# Patient Record
Sex: FEMALE | Race: WHITE | NOT HISPANIC OR LATINO | Employment: OTHER | URBAN - METROPOLITAN AREA
[De-identification: names, ages, dates, MRNs, and addresses within clinical notes are randomized per-mention and may not be internally consistent; named-entity substitution may affect disease eponyms.]

---

## 2017-01-19 ENCOUNTER — HISTORICAL (OUTPATIENT)
Dept: RADIOLOGY | Facility: HOSPITAL | Age: 67
End: 2017-01-19

## 2017-02-16 ENCOUNTER — HISTORICAL (OUTPATIENT)
Dept: RADIOLOGY | Facility: HOSPITAL | Age: 67
End: 2017-02-16

## 2017-05-18 ENCOUNTER — HISTORICAL (OUTPATIENT)
Dept: RADIOLOGY | Facility: HOSPITAL | Age: 67
End: 2017-05-18

## 2018-01-22 ENCOUNTER — HISTORICAL (OUTPATIENT)
Dept: CARDIOLOGY | Facility: HOSPITAL | Age: 68
End: 2018-01-22

## 2018-07-24 ENCOUNTER — HISTORICAL (OUTPATIENT)
Dept: SURGERY | Facility: HOSPITAL | Age: 68
End: 2018-07-24

## 2018-07-26 ENCOUNTER — HISTORICAL (OUTPATIENT)
Dept: ANESTHESIOLOGY | Facility: HOSPITAL | Age: 68
End: 2018-07-26

## 2018-09-17 ENCOUNTER — HISTORICAL (OUTPATIENT)
Dept: RESPIRATORY THERAPY | Facility: HOSPITAL | Age: 68
End: 2018-09-17

## 2018-09-28 ENCOUNTER — HISTORICAL (OUTPATIENT)
Dept: RADIOLOGY | Facility: HOSPITAL | Age: 68
End: 2018-09-28

## 2018-10-19 ENCOUNTER — HISTORICAL (OUTPATIENT)
Dept: RADIOLOGY | Facility: HOSPITAL | Age: 68
End: 2018-10-19

## 2019-08-29 ENCOUNTER — HISTORICAL (OUTPATIENT)
Dept: RADIOLOGY | Facility: HOSPITAL | Age: 69
End: 2019-08-29

## 2019-10-21 ENCOUNTER — HISTORICAL (OUTPATIENT)
Dept: RADIOLOGY | Facility: HOSPITAL | Age: 69
End: 2019-10-21

## 2020-08-25 ENCOUNTER — HISTORICAL (OUTPATIENT)
Dept: RESPIRATORY THERAPY | Facility: HOSPITAL | Age: 70
End: 2020-08-25

## 2021-03-17 ENCOUNTER — HISTORICAL (OUTPATIENT)
Dept: RADIOLOGY | Facility: HOSPITAL | Age: 71
End: 2021-03-17

## 2021-05-20 ENCOUNTER — HISTORICAL (OUTPATIENT)
Dept: LAB | Facility: HOSPITAL | Age: 71
End: 2021-05-20

## 2021-07-23 ENCOUNTER — HISTORICAL (OUTPATIENT)
Dept: RADIOLOGY | Facility: HOSPITAL | Age: 71
End: 2021-07-23

## 2021-07-27 ENCOUNTER — HISTORICAL (OUTPATIENT)
Dept: RADIOLOGY | Facility: HOSPITAL | Age: 71
End: 2021-07-27

## 2021-12-19 ENCOUNTER — HISTORICAL (OUTPATIENT)
Dept: ADMINISTRATIVE | Facility: HOSPITAL | Age: 71
End: 2021-12-19

## 2021-12-22 ENCOUNTER — HISTORICAL (OUTPATIENT)
Dept: ADMINISTRATIVE | Facility: HOSPITAL | Age: 71
End: 2021-12-22

## 2022-01-01 ENCOUNTER — HISTORICAL (OUTPATIENT)
Dept: ADMINISTRATIVE | Facility: HOSPITAL | Age: 72
End: 2022-01-01

## 2022-01-05 ENCOUNTER — HISTORICAL (OUTPATIENT)
Dept: ADMINISTRATIVE | Facility: HOSPITAL | Age: 72
End: 2022-01-05

## 2022-01-05 LAB
ABS NEUT (OLG): 6.1 X10(3)/MCL (ref 1.5–6.9)
ALBUMIN SERPL-MCNC: 3.2 GM/DL (ref 3.4–4.8)
ALBUMIN/GLOB SERPL: 0.9 RATIO (ref 1.1–2)
ALP SERPL-CCNC: 107 UNIT/L (ref 40–150)
ALT SERPL-CCNC: 9 UNIT/L (ref 0–55)
AST SERPL-CCNC: 17 UNIT/L (ref 5–34)
BASOPHILS # BLD AUTO: 0.1 X10(3)/MCL (ref 0–0.1)
BASOPHILS NFR BLD AUTO: 1 % (ref 0–1)
BILIRUB SERPL-MCNC: 0.8 MG/DL
BILIRUBIN DIRECT+TOT PNL SERPL-MCNC: 0.3 MG/DL (ref 0–0.8)
BILIRUBIN DIRECT+TOT PNL SERPL-MCNC: 0.5 MG/DL (ref 0–0.5)
BNP BLD-MCNC: 647.1 PG/ML
BUN SERPL-MCNC: 24 MG/DL (ref 9.8–20.1)
CALCIUM SERPL-MCNC: 9.5 MG/DL (ref 8.7–10.5)
CHLORIDE SERPL-SCNC: 93 MMOL/L (ref 98–107)
CO2 SERPL-SCNC: 36 MMOL/L (ref 23–31)
CREAT SERPL-MCNC: 1.28 MG/DL (ref 0.55–1.02)
EOSINOPHIL # BLD AUTO: 0.4 X10(3)/MCL (ref 0–0.6)
EOSINOPHIL NFR BLD AUTO: 4 % (ref 0–5)
ERYTHROCYTE [DISTWIDTH] IN BLOOD BY AUTOMATED COUNT: 17.1 % (ref 11.5–17)
GLOBULIN SER-MCNC: 3.6 GM/DL (ref 2.4–3.5)
GLUCOSE SERPL-MCNC: 147 MG/DL (ref 82–115)
HCT VFR BLD AUTO: 33.2 % (ref 36–48)
HGB BLD-MCNC: 9.8 GM/DL (ref 12–16)
IMM GRANULOCYTES # BLD AUTO: 0.03 10*3/UL (ref 0–0.02)
IMM GRANULOCYTES NFR BLD AUTO: 0.3 % (ref 0–0.43)
LYMPHOCYTES # BLD AUTO: 1.9 X10(3)/MCL (ref 0.5–4.1)
LYMPHOCYTES NFR BLD AUTO: 21 % (ref 15–40)
MCH RBC QN AUTO: 25 PG (ref 27–34)
MCHC RBC AUTO-ENTMCNC: 30 GM/DL (ref 31–36)
MCV RBC AUTO: 84 FL (ref 80–99)
MONOCYTES # BLD AUTO: 0.6 X10(3)/MCL (ref 0–1.1)
MONOCYTES NFR BLD AUTO: 7 % (ref 4–12)
NEUTROPHILS # BLD AUTO: 6.1 X10(3)/MCL (ref 1.5–6.9)
NEUTROPHILS NFR BLD AUTO: 67 % (ref 43–75)
PLATELET # BLD AUTO: 302 X10(3)/MCL (ref 140–400)
PMV BLD AUTO: 9.8 FL (ref 6.8–10)
POTASSIUM SERPL-SCNC: 3.7 MMOL/L (ref 3.5–5.1)
PROT SERPL-MCNC: 6.8 GM/DL (ref 5.8–7.6)
RBC # BLD AUTO: 3.94 X10(6)/MCL (ref 4.2–5.4)
SODIUM SERPL-SCNC: 139 MMOL/L (ref 136–145)
WBC # SPEC AUTO: 9.1 X10(3)/MCL (ref 4.5–11.5)

## 2022-01-13 ENCOUNTER — HISTORICAL (OUTPATIENT)
Dept: ADMINISTRATIVE | Facility: HOSPITAL | Age: 72
End: 2022-01-13

## 2022-01-13 LAB
APPEARANCE, UA: ABNORMAL
BACTERIA SPEC CULT: ABNORMAL /HPF
BILIRUB UR QL STRIP: NEGATIVE
COLOR UR: YELLOW
GLUCOSE (UA): NEGATIVE MG/DL
HGB UR QL STRIP: ABNORMAL
KETONES UR QL STRIP: NEGATIVE MG/DL
LEUKOCYTE ESTERASE UR QL STRIP: ABNORMAL
NITRITE UR QL STRIP: NEGATIVE
PH UR STRIP: 6 [PH] (ref 4.6–8)
PROT UR QL STRIP: 30 MG/DL
RBC #/AREA URNS HPF: ABNORMAL /HPF
SP GR UR STRIP: 1.02 (ref 1–1.03)
SQUAMOUS EPITHELIAL, UA: ABNORMAL
UROBILINOGEN UR STRIP-ACNC: 2 EU/DL
WBC #/AREA URNS HPF: ABNORMAL /HPF

## 2022-01-16 ENCOUNTER — HISTORICAL (OUTPATIENT)
Dept: ADMINISTRATIVE | Facility: HOSPITAL | Age: 72
End: 2022-01-16

## 2022-01-16 LAB
BUN SERPL-MCNC: 20 MG/DL (ref 9.8–20.1)
CALCIUM SERPL-MCNC: 9.3 MG/DL (ref 8.7–10.5)
CHLORIDE SERPL-SCNC: 96 MMOL/L (ref 98–107)
CO2 SERPL-SCNC: 36 MMOL/L (ref 23–31)
CREAT SERPL-MCNC: 1.23 MG/DL (ref 0.55–1.02)
CREAT/UREA NIT SERPL: 16
GLUCOSE SERPL-MCNC: 277 MG/DL (ref 82–115)
POTASSIUM SERPL-SCNC: 3.4 MMOL/L (ref 3.5–5.1)
SODIUM SERPL-SCNC: 141 MMOL/L (ref 136–145)

## 2022-01-22 ENCOUNTER — HISTORICAL (OUTPATIENT)
Dept: ADMINISTRATIVE | Facility: HOSPITAL | Age: 72
End: 2022-01-22

## 2022-01-22 LAB
BUN SERPL-MCNC: 35 MG/DL (ref 9.8–20.1)
CALCIUM SERPL-MCNC: 8.8 MG/DL (ref 8.7–10.5)
CHLORIDE SERPL-SCNC: 92 MMOL/L (ref 98–107)
CO2 SERPL-SCNC: 34 MMOL/L (ref 23–31)
CREAT SERPL-MCNC: 1.29 MG/DL (ref 0.55–1.02)
CREAT/UREA NIT SERPL: 27
GLUCOSE SERPL-MCNC: 256 MG/DL (ref 82–115)
POTASSIUM SERPL-SCNC: 3.1 MMOL/L (ref 3.5–5.1)
SODIUM SERPL-SCNC: 138 MMOL/L (ref 136–145)

## 2022-01-23 ENCOUNTER — HISTORICAL (OUTPATIENT)
Dept: ADMINISTRATIVE | Facility: HOSPITAL | Age: 72
End: 2022-01-23

## 2022-01-23 LAB
BUN SERPL-MCNC: 31 MG/DL (ref 9.8–20.1)
CALCIUM SERPL-MCNC: 9.2 MG/DL (ref 8.7–10.5)
CHLORIDE SERPL-SCNC: 94 MMOL/L (ref 98–107)
CO2 SERPL-SCNC: 34 MMOL/L (ref 23–31)
CREAT SERPL-MCNC: 1.31 MG/DL (ref 0.55–1.02)
CREAT/UREA NIT SERPL: 24
GLUCOSE SERPL-MCNC: 142 MG/DL (ref 82–115)
POTASSIUM SERPL-SCNC: 3.5 MMOL/L (ref 3.5–5.1)
SODIUM SERPL-SCNC: 138 MMOL/L (ref 136–145)

## 2022-02-06 ENCOUNTER — HISTORICAL (OUTPATIENT)
Dept: ADMINISTRATIVE | Facility: HOSPITAL | Age: 72
End: 2022-02-06

## 2022-02-06 LAB
BUN SERPL-MCNC: 25 MG/DL (ref 9.8–20.1)
CALCIUM SERPL-MCNC: 9.8 MG/DL (ref 8.7–10.5)
CHLORIDE SERPL-SCNC: 92 MMOL/L (ref 98–107)
CO2 SERPL-SCNC: 37 MMOL/L (ref 23–31)
CREAT SERPL-MCNC: 1.38 MG/DL (ref 0.55–1.02)
CREAT/UREA NIT SERPL: 18
GLUCOSE SERPL-MCNC: 186 MG/DL (ref 82–115)
HEMOLYSIS INTERF INDEX SERPL-ACNC: 3
ICTERIC INTERF INDEX SERPL-ACNC: 0
LIPEMIC INTERF INDEX SERPL-ACNC: 10
POTASSIUM SERPL-SCNC: 4.3 MMOL/L (ref 3.5–5.1)
SODIUM SERPL-SCNC: 141 MMOL/L (ref 136–145)

## 2022-02-08 ENCOUNTER — HISTORICAL (OUTPATIENT)
Dept: ADMINISTRATIVE | Facility: HOSPITAL | Age: 72
End: 2022-02-08

## 2022-02-08 LAB
ABS NEUT (OLG): 4.7 (ref 1.5–6.9)
ALBUMIN SERPL-MCNC: 3.4 G/DL (ref 3.4–4.8)
ALBUMIN/GLOB SERPL: 1 {RATIO} (ref 1.1–2)
ALP SERPL-CCNC: 129 U/L (ref 40–150)
ALT SERPL-CCNC: 8 U/L (ref 0–55)
AST SERPL-CCNC: 20 U/L (ref 5–34)
BASOPHILS # BLD AUTO: 0.1 10*3/UL (ref 0–0.1)
BASOPHILS NFR BLD AUTO: 1 % (ref 0–1)
BILIRUB SERPL-MCNC: 0.5 MG/DL
BILIRUBIN DIRECT+TOT PNL SERPL-MCNC: 0.2 (ref 0–0.8)
BILIRUBIN DIRECT+TOT PNL SERPL-MCNC: 0.3 (ref 0–0.5)
BNP BLD-MCNC: 334.7 PG/ML
BUN SERPL-MCNC: 37 MG/DL (ref 9.8–20.1)
CALCIUM SERPL-MCNC: 9.8 MG/DL (ref 8.7–10.5)
CHLORIDE SERPL-SCNC: 86 MMOL/L (ref 98–107)
CO2 SERPL-SCNC: 42 MMOL/L (ref 23–31)
CREAT SERPL-MCNC: 1.56 MG/DL (ref 0.55–1.02)
EOSINOPHIL # BLD AUTO: 0.3 10*3/UL (ref 0–0.6)
EOSINOPHIL NFR BLD AUTO: 4 % (ref 0–5)
ERYTHROCYTE [DISTWIDTH] IN BLOOD BY AUTOMATED COUNT: 16.1 % (ref 11.5–17)
GLOBULIN SER-MCNC: 3.5 G/DL (ref 2.4–3.5)
GLUCOSE SERPL-MCNC: 169 MG/DL (ref 82–115)
HCT VFR BLD AUTO: 33.1 % (ref 36–48)
HEMOLYSIS INTERF INDEX SERPL-ACNC: 0
HGB BLD-MCNC: 10.2 G/DL (ref 12–16)
ICTERIC INTERF INDEX SERPL-ACNC: 0
IMM GRANULOCYTES # BLD AUTO: 0.03 10*3/UL (ref 0–0.02)
IMM GRANULOCYTES NFR BLD AUTO: 0.4 % (ref 0–0.43)
LIPEMIC INTERF INDEX SERPL-ACNC: >10
LYMPHOCYTES # BLD AUTO: 1.5 10*3/UL (ref 0.5–4.1)
LYMPHOCYTES NFR BLD AUTO: 21 % (ref 15–40)
MANUAL DIFF? (OHS): NO
MCH RBC QN AUTO: 25 PG (ref 27–34)
MCHC RBC AUTO-ENTMCNC: 31 G/DL (ref 31–36)
MCV RBC AUTO: 82 FL (ref 80–99)
MONOCYTES # BLD AUTO: 0.7 10*3/UL (ref 0–1.1)
MONOCYTES NFR BLD AUTO: 10 % (ref 4–12)
NEUTROPHILS # BLD AUTO: 4.7 10*3/UL (ref 1.5–6.9)
NEUTROPHILS NFR BLD AUTO: 64 % (ref 43–75)
PLATELET # BLD AUTO: 270 10*3/UL (ref 140–400)
PMV BLD AUTO: 10.2 FL (ref 6.8–10)
POTASSIUM SERPL-SCNC: 3.2 MMOL/L (ref 3.5–5.1)
PROT SERPL-MCNC: 6.9 G/DL (ref 5.8–7.6)
RBC # BLD AUTO: 4.04 10*6/UL (ref 4.2–5.4)
SODIUM SERPL-SCNC: 137 MMOL/L (ref 136–145)
WBC # SPEC AUTO: 7.4 10*3/UL (ref 4.5–11.5)

## 2022-02-09 ENCOUNTER — HISTORICAL (OUTPATIENT)
Dept: RADIOLOGY | Facility: HOSPITAL | Age: 72
End: 2022-02-09

## 2022-04-30 NOTE — OP NOTE
PREOPERATIVE DIAGNOSIS:  Screening colonoscopy.    POSTOPERATIVE DIAGNOSIS:  Sigmoid subcentimeter hot polypectomy removal piecemeal.    INDICATIONS:  A 67-year-old white female, here greater than 10 years, for a colonoscopy.  She has had some anemia in the past, but none currently.     She was consented for the procedure in my office.  The risks and benefits of procedure were explained to her in detail.  She is willing to undergo those risks.    DESCRIPTION OF PROCEDURE:  She was brought down to the endoscopy room suite, laid in the left lateral decubitus position.  Rectal exam was performed, it was within normal limits.  No internal and external abnormalities.     The colonoscope was advanced all the way to the cecum.  The cecum was visualized and photographed.  The appendiceal orifice was visualized but not intubated, due to some mild tortuosity of her colon.  There were no masses, polyps, or any mucosal changes to the cecum.  The same applies to the ascending colon.  360 degree circumferential views were taken of the right-side.  This was insufflated to the same degree for the rest of the colon.  There were no abnormalities noted to the hepatic flexure, transverse colon, splenic flexure, descending colon.  There were no diverticula.     At approximately the 20 to 25 cm junction in the sigmoid, there was a small questionable hyperplastic-appearing polyp that was hot biopsied and removed piecemeal.  The prep overall was adequate.     The scope was then pulled back into the rectum, and the retroflexion showed no internal abnormalities.     In summary, a 67-year-old white female with a subcentimeter hot biopsy piecemeal polypectomy of sigmoid polyp.    RECOMMENDATION:  We will follow up on the biopsies closely.  That will dictate ultimately, likely a 5-year repeat, unless it is clinically indicated sooner.        CARMEN   DD: 07/26/2018 0837   DT: 07/26/2018 0852  Job # [id number]    cc: Chuck OLIVAS  Shayy SINGH M.D.

## 2022-08-30 ENCOUNTER — HOSPITAL ENCOUNTER (OUTPATIENT)
Dept: RADIOLOGY | Facility: HOSPITAL | Age: 72
Discharge: HOME | End: 2022-08-30
Attending: INTERNAL MEDICINE
Payer: MEDICARE

## 2022-08-30 DIAGNOSIS — R06.09 CHRONIC DYSPNEA: ICD-10-CM

## 2022-08-30 DIAGNOSIS — Z12.31 ENCOUNTER FOR SCREENING MAMMOGRAM FOR MALIGNANT NEOPLASM OF BREAST: ICD-10-CM

## 2022-08-30 PROCEDURE — 77063 MAMMO DIGITAL SCREENING BILAT WITH TOMO: ICD-10-PCS | Mod: 26,,, | Performed by: STUDENT IN AN ORGANIZED HEALTH CARE EDUCATION/TRAINING PROGRAM

## 2022-08-30 PROCEDURE — 77063 BREAST TOMOSYNTHESIS BI: CPT | Mod: 26,,, | Performed by: STUDENT IN AN ORGANIZED HEALTH CARE EDUCATION/TRAINING PROGRAM

## 2022-08-30 PROCEDURE — 77067 SCR MAMMO BI INCL CAD: CPT | Mod: TC

## 2022-08-30 PROCEDURE — 77067 SCR MAMMO BI INCL CAD: CPT | Mod: 26,,, | Performed by: STUDENT IN AN ORGANIZED HEALTH CARE EDUCATION/TRAINING PROGRAM

## 2022-08-30 PROCEDURE — 71046 X-RAY EXAM CHEST 2 VIEWS: CPT | Mod: TC

## 2022-08-30 PROCEDURE — 77067 MAMMO DIGITAL SCREENING BILAT WITH TOMO: ICD-10-PCS | Mod: 26,,, | Performed by: STUDENT IN AN ORGANIZED HEALTH CARE EDUCATION/TRAINING PROGRAM

## 2023-03-30 DIAGNOSIS — Z12.31 ENCOUNTER FOR SCREENING MAMMOGRAM FOR MALIGNANT NEOPLASM OF BREAST: Primary | ICD-10-CM

## 2023-03-30 DIAGNOSIS — Z78.0 POSTMENOPAUSE: ICD-10-CM

## 2023-04-05 ENCOUNTER — HOSPITAL ENCOUNTER (OUTPATIENT)
Dept: WOUND CARE | Facility: HOSPITAL | Age: 73
Discharge: HOME | End: 2023-04-05
Attending: FAMILY MEDICINE
Payer: MEDICARE

## 2023-04-05 VITALS
SYSTOLIC BLOOD PRESSURE: 134 MMHG | HEART RATE: 83 BPM | DIASTOLIC BLOOD PRESSURE: 71 MMHG | WEIGHT: 230 LBS | HEIGHT: 67 IN | BODY MASS INDEX: 36.1 KG/M2 | RESPIRATION RATE: 18 BRPM

## 2023-04-05 DIAGNOSIS — L89.312 PRESSURE INJURY OF RIGHT BUTTOCK, STAGE 2: Primary | ICD-10-CM

## 2023-04-05 PROCEDURE — 99203 OFFICE O/P NEW LOW 30 MIN: CPT | Mod: 25

## 2023-04-05 PROCEDURE — 17250 CHEM CAUT OF GRANLTJ TISSUE: CPT | Mod: 59

## 2023-04-05 PROCEDURE — 27000999 HC MEDICAL RECORD PHOTO DOCUMENTATION

## 2023-04-05 PROCEDURE — 97597 DBRDMT OPN WND 1ST 20 CM/<: CPT

## 2023-04-05 RX ORDER — SITAGLIPTIN 100 MG/1
100 TABLET, FILM COATED ORAL EVERY MORNING
COMMUNITY
Start: 2023-03-09

## 2023-04-05 RX ORDER — METOPROLOL SUCCINATE 25 MG/1
25 TABLET, EXTENDED RELEASE ORAL 2 TIMES DAILY
COMMUNITY
Start: 2023-04-03

## 2023-04-05 RX ORDER — MULTIVIT,IRON,MINERALS/LUTEIN
1 TABLET ORAL EVERY MORNING
COMMUNITY
Start: 2023-03-23

## 2023-04-05 RX ORDER — NICOTINE 11MG/24HR
2000 PATCH, TRANSDERMAL 24 HOURS TRANSDERMAL EVERY MORNING
COMMUNITY
Start: 2023-03-23

## 2023-04-05 RX ORDER — HYDROCODONE BITARTRATE AND ACETAMINOPHEN 5; 325 MG/1; MG/1
TABLET ORAL
COMMUNITY
Start: 2023-03-23 | End: 2024-04-02

## 2023-04-05 RX ORDER — ZINC GLUCONATE 50 MG
50 TABLET ORAL EVERY MORNING
COMMUNITY

## 2023-04-05 RX ORDER — TORSEMIDE 20 MG/1
40 TABLET ORAL 2 TIMES DAILY
COMMUNITY
Start: 2023-03-09

## 2023-04-05 RX ORDER — VENLAFAXINE 50 MG/1
TABLET ORAL
COMMUNITY
Start: 2023-03-09 | End: 2023-04-12 | Stop reason: DRUGHIGH

## 2023-04-05 RX ORDER — SEMAGLUTIDE 1.34 MG/ML
0.5 INJECTION, SOLUTION SUBCUTANEOUS
COMMUNITY
Start: 2023-03-30

## 2023-04-05 RX ORDER — LEVOTHYROXINE SODIUM 50 UG/1
50 TABLET ORAL
COMMUNITY
End: 2024-04-02

## 2023-04-05 RX ORDER — METFORMIN HYDROCHLORIDE 500 MG/1
500 TABLET ORAL
COMMUNITY

## 2023-04-05 RX ORDER — APIXABAN 2.5 MG/1
2.5 TABLET, FILM COATED ORAL 2 TIMES DAILY
COMMUNITY
Start: 2023-03-09

## 2023-04-05 RX ORDER — OMEPRAZOLE 20 MG/1
20 CAPSULE, DELAYED RELEASE ORAL EVERY MORNING
COMMUNITY

## 2023-04-05 RX ORDER — GLIMEPIRIDE 1 MG/1
1 TABLET ORAL 2 TIMES DAILY
COMMUNITY
Start: 2023-03-30

## 2023-04-05 RX ORDER — QUINIDINE GLUCONATE 324 MG
240 TABLET, EXTENDED RELEASE ORAL EVERY MORNING
COMMUNITY

## 2023-04-05 RX ORDER — EZETIMIBE 10 MG/1
10 TABLET ORAL NIGHTLY
COMMUNITY
Start: 2023-03-09

## 2023-04-05 RX ORDER — GEMFIBROZIL 600 MG/1
600 TABLET, FILM COATED ORAL
COMMUNITY
End: 2024-04-02

## 2023-04-05 RX ORDER — PREGABALIN 100 MG/1
200 CAPSULE ORAL 2 TIMES DAILY
COMMUNITY
Start: 2023-03-09

## 2023-04-05 NOTE — PROGRESS NOTES
Subjective:       Patient ID: Kaila Hammond is a 72 y.o. female.    Chief Complaint: Pressure Ulcer    HPI    71yo F with PMHx of HTN, HLD, DMII (most recent HBA1c 12.0%), CKD, hypothyroidism CAD s/p CABG 10/27/21 with extensive recovery requiring 6+ months of rehab.   Patient reports while she was in rehab, she developed wounds on her sacrum, right buttock and toes.   Most of these wounds have healed since she has been home.   She has been using gentamicin ointment on the remaining right buttock wound.     Wound evaluated today, clean with good granulation tissue. Heaping edges noted - silver nitrate applied.     Will start using collagen, gauze and medipore tape.        Right Buttock  4/5/23  0.6 x 1.5 x 0.2      Review of Systems   Constitutional:  Negative for chills and fever.   Respiratory:  Negative for cough.    Cardiovascular:  Negative for chest pain.   Skin:  Positive for wound.       Objective:      Vitals:    04/05/23 0833   BP: 134/71   Pulse: 83   Resp: 18       Physical Exam  Vitals reviewed.   Constitutional:       Appearance: Normal appearance.   HENT:      Head: Normocephalic and atraumatic.   Eyes:      Extraocular Movements: Extraocular movements intact.      Pupils: Pupils are equal, round, and reactive to light.   Cardiovascular:      Rate and Rhythm: Normal rate.   Pulmonary:      Effort: Pulmonary effort is normal.   Skin:     Comments: Wound as described and pictured   Neurological:      Mental Status: She is alert.          Altered Skin Integrity 04/05/23 0847 Right Buttocks #1 (Active)   04/05/23 0847   Altered Skin Integrity Present on Admission - Did Patient arrive to the hospital with altered skin?: yes   Side: Right   Orientation:    Location: Buttocks   Wound Number: #1   Is this injury device related?:    Primary Wound Type:    Description of Altered Skin Integrity:    Ankle-Brachial Index:    Pulses:    Removal Indication and Assessment:    Wound Outcome:    (Retired) Wound  Length (cm):    (Retired) Wound Width (cm):    (Retired) Depth (cm):    Wound Description (Comments):    Removal Indications:    Dressing Appearance Moist drainage 04/05/23 0835   Drainage Amount Moderate 04/05/23 0835   Drainage Characteristics/Odor Serosanguineous 04/05/23 0835   Appearance Red;Pink;Moist 04/05/23 0835   Tissue loss description Full thickness 04/05/23 0835   Periwound Area Dry;[place] 04/05/23 0835   Wound Edges Defined 04/05/23 0835   Wound Length (cm) 0.6 cm 04/05/23 0835   Wound Width (cm) 1.5 cm 04/05/23 0835   Wound Depth (cm) 0.2 cm 04/05/23 0835   Wound Volume (cm^3) 0.18 cm^3 04/05/23 0835   Wound Surface Area (cm^2) 0.9 cm^2 04/05/23 0835   Care Cleansed with:;Wound cleanser 04/05/23 0835   Dressing Applied 04/05/23 0835   Dressing Change Due 04/08/23 04/05/23 0835 4/5/23       Pre       Post            Assessment:         ICD-10-CM ICD-9-CM   1. Pressure injury of right buttock, stage 2  L89.312 707.05     707.22           Procedures:           [] Yes [x] No   I & D performed  [] Yes [x] No   Excisional debridement performed  [] Yes [x] No   Selective debridement performed           [] Yes [x] No   Mechanical debridement performed         [x] Yes [] No  Silver nitrate applied                                     [] Yes [x] No  Labs ordered this visit                                  [] Yes [x] No   Imaging ordered this visit                           [] Yes [x] No   Tissue pathology and/or culture taken             MEDICATIONS    Current Outpatient Medications:     CENTRUM SILVER WOMEN 8 mg iron-400 mcg-300 mcg Tab, Take 1 tablet by mouth., Disp: , Rfl:     ELIQUIS 2.5 mg Tab, , Disp: , Rfl:     ezetimibe (ZETIA) 10 mg tablet, , Disp: , Rfl:     ferrous gluconate (FERGON) 240 (27 FE) MG tablet, Take 240 mg by mouth., Disp: , Rfl:     gemfibroziL (LOPID) 600 MG tablet, Take 600 mg by mouth., Disp: , Rfl:     glimepiride (AMARYL) 1 MG tablet, , Disp: , Rfl:     HYDROcodone-acetaminophen  (NORCO) 5-325 mg per tablet, , Disp: , Rfl:     JANUVIA 100 mg Tab, , Disp: , Rfl:     levothyroxine (SYNTHROID) 50 MCG tablet, Take 50 mcg by mouth before breakfast., Disp: , Rfl:     metFORMIN (GLUCOPHAGE) 500 MG tablet, Take 500 mg by mouth 2 (two) times daily., Disp: , Rfl:     metoprolol succinate (TOPROL-XL) 25 MG 24 hr tablet, Take 25 mg by mouth 2 (two) times daily., Disp: , Rfl:     omeprazole (PRILOSEC) 20 MG capsule, Take 20 mg by mouth once daily., Disp: , Rfl:     OZEMPIC 0.25 mg or 0.5 mg(2 mg/1.5 mL) pen injector, , Disp: , Rfl:     pregabalin (LYRICA) 100 MG capsule, , Disp: , Rfl:     torsemide (DEMADEX) 20 MG Tab, , Disp: , Rfl:     venlafaxine (EFFEXOR) 50 MG Tab, , Disp: , Rfl:     VITAMIN D3 50 mcg (2,000 unit) Cap capsule, Take 2,000 Units by mouth., Disp: , Rfl:     zinc gluconate 50 mg tablet, Take 50 mg by mouth once daily., Disp: , Rfl:    Review of patient's allergies indicates:   Allergen Reactions    Augmentin [amoxicillin-pot clavulanate]     Cefaclor     Cephalexin     Penicillins     Sulfa (sulfonamide antibiotics)     Sulfamethoxazole-trimethoprim Hives         HOME HEALTH AGENCY:  N/A  TIMES PER WEEK/DAYS:    WOUND CARE ORDERS:  Right buttock: **leave dressing in place until Saturday** On Saturday, shower, then remove dressing, pat area dry, place small piece of collagen in wound bed, cover with 4x4 gauze and tape, leave this in place until Monday and repeat this process. Pt will be seen in wound care clinic on Wednesday    Follow up in 1 week (on 4/12/2023) for stretcher.

## 2023-04-05 NOTE — ADDENDUM NOTE
Encounter addended by: Meaghan Quiroz RN on: 4/5/2023 10:31 AM   Actions taken: Chief Complaint modified

## 2023-04-05 NOTE — PATIENT INSTRUCTIONS
Right buttock: **leave dressing in place until Saturday** On Saturday, shower, then remove dressing, pat area dry, place small piece of collagen in wound bed, cover with 4x4 gauze and tape, leave this in place until Monday and repeat this process. Pt will be seen in wound care clinic on Wednesday

## 2023-04-12 ENCOUNTER — HOSPITAL ENCOUNTER (OUTPATIENT)
Dept: WOUND CARE | Facility: HOSPITAL | Age: 73
Discharge: HOME | End: 2023-04-12
Attending: FAMILY MEDICINE
Payer: MEDICARE

## 2023-04-12 VITALS
WEIGHT: 230 LBS | RESPIRATION RATE: 20 BRPM | BODY MASS INDEX: 36.1 KG/M2 | SYSTOLIC BLOOD PRESSURE: 116 MMHG | HEIGHT: 67 IN | DIASTOLIC BLOOD PRESSURE: 75 MMHG | HEART RATE: 81 BPM

## 2023-04-12 DIAGNOSIS — L89.312 PRESSURE INJURY OF RIGHT BUTTOCK, STAGE 2: Primary | ICD-10-CM

## 2023-04-12 PROCEDURE — 99212 OFFICE O/P EST SF 10 MIN: CPT

## 2023-04-12 PROCEDURE — 27000999 HC MEDICAL RECORD PHOTO DOCUMENTATION

## 2023-04-12 RX ORDER — VENLAFAXINE 100 MG/1
100 TABLET ORAL 2 TIMES DAILY
COMMUNITY
Start: 2023-04-10

## 2023-04-12 NOTE — PROGRESS NOTES
Subjective:       Patient ID: Kaila Hammond is a 72 y.o. female.    Chief Complaint: Pressure Ulcer (Right buttock- Stage 2)    HPI    73yo F with PMHx of HTN, HLD, DMII (most recent HBA1c 12.0%), CKD, hypothyroidism CAD s/p CABG 10/27/21 with extensive recovery requiring 6+ months of rehab.   Patient reports while she was in rehab, she developed wounds on her sacrum, right buttock and toes.   Most of these wounds have healed since she has been home.   She has been using gentamicin ointment on the remaining right buttock wound.     Wound evaluated today, clean with good granulation tissue. Shape changing, starting to taper centrally rather than previous oval shape. Irritation from adhesive also noted - change to gentle foam border. Continue collagen.            Right Buttock  4/5/23  0.6 x 1.5 x 0.2  4/12/23 0.9 x 1.5 x 0.2      Review of Systems   Constitutional:  Negative for chills and fever.   Respiratory:  Negative for cough.    Cardiovascular:  Negative for chest pain.   Skin:  Positive for wound.       Objective:      Vitals:    04/12/23 0818   BP: 116/75   Pulse: 81   Resp: 20       Physical Exam  Vitals reviewed.   Constitutional:       Appearance: Normal appearance.   HENT:      Head: Normocephalic and atraumatic.   Eyes:      Extraocular Movements: Extraocular movements intact.      Pupils: Pupils are equal, round, and reactive to light.   Cardiovascular:      Rate and Rhythm: Normal rate.   Pulmonary:      Effort: Pulmonary effort is normal.   Skin:     Comments: Wound as described and pictured   Neurological:      Mental Status: She is alert.          Altered Skin Integrity 04/05/23 0847 Right Buttocks #1 (Active)   04/05/23 0847   Altered Skin Integrity Present on Admission - Did Patient arrive to the hospital with altered skin?: yes   Side: Right   Orientation:    Location: Buttocks   Wound Number: #1   Is this injury device related?:    Primary Wound Type:    Description of Altered Skin  Integrity:    Ankle-Brachial Index:    Pulses:    Removal Indication and Assessment:    Wound Outcome:    (Retired) Wound Length (cm):    (Retired) Wound Width (cm):    (Retired) Depth (cm):    Wound Description (Comments):    Removal Indications:    Description of Altered Skin Integrity Full thickness tissue loss. Subcutaneous fat may be visible but bone, tendon or muscle are not exposed 04/12/23 0820   Dressing Appearance Moist drainage;Intact 04/12/23 0820   Drainage Amount Moderate 04/12/23 0820   Drainage Characteristics/Odor Serosanguineous 04/12/23 0820   Appearance Pink;Intact;Slough;Moist;Epithelialization;Granulating;Red 04/12/23 0820   Tissue loss description Full thickness 04/12/23 0820   Periwound Area Intact;[place] 04/12/23 0820   Wound Edges Open;Defined 04/12/23 0820   Wound Length (cm) 0.9 cm 04/12/23 0820   Wound Width (cm) 1.5 cm 04/12/23 0820   Wound Depth (cm) 0.2 cm 04/12/23 0820   Wound Volume (cm^3) 0.27 cm^3 04/12/23 0820   Wound Surface Area (cm^2) 1.35 cm^2 04/12/23 0820   Care Cleansed with:;Wound cleanser 04/12/23 0820   Dressing Applied 04/12/23 0820   Dressing Change Due 04/15/23 04/12/23 0820     4/5/23       Pre       Post      4/12/23          Assessment:         ICD-10-CM ICD-9-CM   1. Pressure injury of right buttock, stage 2  L89.312 707.05     707.22           Procedures:           [] Yes [x] No   I & D performed  [] Yes [x] No   Excisional debridement performed  [] Yes [x] No   Selective debridement performed           [x] Yes [] No   Mechanical debridement performed         [] Yes [x] No  Silver nitrate applied                                     [] Yes [x] No  Labs ordered this visit                                  [] Yes [x] No   Imaging ordered this visit                           [] Yes [x] No   Tissue pathology and/or culture taken         *mechanical debridement with gauze and cleaning solution*    MEDICATIONS    Current Outpatient Medications:     CENTRUM S[facility]OM[facility] 8  mg iron-400 mcg-300 mcg Tab, Take 1 tablet by mouth., Disp: , Rfl:     ELIQUIS 2.5 mg Tab, , Disp: , Rfl:     ezetimibe (ZETIA) 10 mg tablet, , Disp: , Rfl:     ferrous gluconate (FERGON) 240 (27 FE) MG tablet, Take 240 mg by mouth., Disp: , Rfl:     gemfibroziL (LOPID) 600 MG tablet, Take 600 mg by mouth., Disp: , Rfl:     glimepiride (AMARYL) 1 MG tablet, , Disp: , Rfl:     HYDROcodone-acetaminophen (NORCO) 5-325 mg per tablet, , Disp: , Rfl:     JANUVIA 100 mg Tab, , Disp: , Rfl:     levothyroxine (SYNTHROID) 50 MCG tablet, Take 50 mcg by mouth before breakfast., Disp: , Rfl:     metFORMIN (GLUCOPHAGE) 500 MG tablet, Take 500 mg by mouth 2 (two) times daily., Disp: , Rfl:     metoprolol succinate (TOPROL-XL) 25 MG 24 hr tablet, Take 25 mg by mouth 2 (two) times daily., Disp: , Rfl:     omeprazole (PRILOSEC) 20 MG capsule, Take 20 mg by mouth once daily., Disp: , Rfl:     OZEMPIC 0.25 mg or 0.5 mg(2 mg/1.5 mL) pen injector, , Disp: , Rfl:     pregabalin (LYRICA) 100 MG capsule, , Disp: , Rfl:     torsemide (DEMADEX) 20 MG Tab, , Disp: , Rfl:     venlafaxine (EFFEXOR) 100 MG Tab, , Disp: , Rfl:     VITAMIN D3 50 mcg (2,000 unit) Cap capsule, Take 2,000 Units by mouth., Disp: , Rfl:     zinc gluconate 50 mg tablet, Take 50 mg by mouth once daily., Disp: , Rfl:    Review of patient's allergies indicates:   Allergen Reactions    Augmentin [amoxicillin-pot clavulanate]     Cefaclor     Cephalexin     Penicillins     Sulfa (sulfonamide antibiotics)     Sulfamethoxazole-trimethoprim Hives         HOME HEALTH AGENCY:  N/A  TIMES PER WEEK/DAYS:    WOUND CARE ORDERS:  Right buttock: **leave dressing in place until Saturday** On Saturday, shower, then remove dressing, pat area dry, place small piece of collagen in wound bed, cover with a gentle border, leave this in place until Monday and repeat this process until patient returns in 2 weeks.    Follow up in about 2 weeks (around 4/26/2023) for Kit.

## 2023-05-17 ENCOUNTER — HOSPITAL ENCOUNTER (OUTPATIENT)
Dept: WOUND CARE | Facility: HOSPITAL | Age: 73
Discharge: HOME | End: 2023-05-17
Attending: FAMILY MEDICINE
Payer: MEDICARE

## 2023-05-17 VITALS
DIASTOLIC BLOOD PRESSURE: 82 MMHG | HEIGHT: 67 IN | BODY MASS INDEX: 36.1 KG/M2 | RESPIRATION RATE: 18 BRPM | HEART RATE: 83 BPM | SYSTOLIC BLOOD PRESSURE: 147 MMHG | WEIGHT: 230 LBS

## 2023-05-17 DIAGNOSIS — L89.312 PRESSURE INJURY OF RIGHT BUTTOCK, STAGE 2: Primary | ICD-10-CM

## 2023-05-17 PROCEDURE — 27000999 HC MEDICAL RECORD PHOTO DOCUMENTATION

## 2023-05-17 PROCEDURE — 97597 DBRDMT OPN WND 1ST 20 CM/<: CPT

## 2023-05-17 PROCEDURE — 99213 OFFICE O/P EST LOW 20 MIN: CPT | Mod: 25

## 2023-05-17 NOTE — PROGRESS NOTES
Subjective:       Patient ID: Kaila Hammond is a 72 y.o. female.    Chief Complaint: Pressure Ulcer (Right buttock - stage 2 )    HPI    71yo F with PMHx of HTN, HLD, DMII (most recent HBA1c 12.0%), CKD, hypothyroidism CAD s/p CABG 10/27/21 with extensive recovery requiring 6+ months of rehab.   Patient reports while she was in rehab, she developed wounds on her sacrum, right buttock and toes.   Most of these wounds have healed since she has been home.   She had been using gentamicin ointment on the remaining right buttock wound.     Gentle foam border with collagen - patient had trouble with keeping dressing in place but continued to change bandage as necessary.     Wound evaluated today, nearly healed with some product build-up and eschar. Edges of wound and irritation from adhesive still noted. Will DC dressings and re-evaluate in 1 week.        Right Buttock  4/5/23  0.6 x 1.5 x 0.2  4/12/23 0.9 x 1.5 x 0.2  5/17/23 0.3 x 0.5 x 0.2      Review of Systems   Constitutional:  Negative for chills and fever.   Respiratory:  Negative for cough.    Cardiovascular:  Negative for chest pain.   Skin:  Positive for wound.       Objective:      Vitals:    05/17/23 0849   BP: (!) 147/82   Pulse: 83   Resp: 18       Physical Exam  Vitals reviewed.   Constitutional:       Appearance: Normal appearance.   HENT:      Head: Normocephalic and atraumatic.   Eyes:      Extraocular Movements: Extraocular movements intact.      Pupils: Pupils are equal, round, and reactive to light.   Cardiovascular:      Rate and Rhythm: Normal rate.   Pulmonary:      Effort: Pulmonary effort is normal.   Skin:     Comments: Wound as described and pictured   Neurological:      Mental Status: She is alert.          Altered Skin Integrity 04/05/23 0847 Right Buttocks #1 (Active)   04/05/23 0847   Altered Skin Integrity Present on Admission - Did Patient arrive to the hospital with altered skin?: yes   Side: Right   Orientation:    Location:  Buttocks   Wound Number: #1   Is this injury device related?:    Primary Wound Type:    Description of Altered Skin Integrity:    Ankle-Brachial Index:    Pulses:    Removal Indication and Assessment:    Wound Outcome:    (Retired) Wound Length (cm):    (Retired) Wound Width (cm):    (Retired) Depth (cm):    Wound Description (Comments):    Removal Indications:    Dressing Appearance Dry 05/17/23 0853   Drainage Amount None 05/17/23 0853   Appearance Pink 05/17/23 0853   Tissue loss description Partial thickness 05/17/23 0853   Periwound Area Intact 05/17/23 0853   Wound Edges Open 05/17/23 0853   Wound Length (cm) 0.3 cm 05/17/23 0853   Wound Width (cm) 0.5 cm 05/17/23 0853   Wound Depth (cm) 0.2 cm 05/17/23 0853   Wound Volume (cm^3) 0.03 cm^3 05/17/23 0853   Wound Surface Area (cm^2) 0.15 cm^2 05/17/23 0853   Care Cleansed with:;Wound cleanser 05/17/23 0853     4/5/23       Pre       Post      4/12/23 5/17/23           Pre        Post                  Assessment:         ICD-10-CM ICD-9-CM   1. Pressure injury of right buttock, stage 2  L89.312 707.05     707.22           Procedures:           [] Yes [x] No   I & D performed  [] Yes [x] No   Excisional debridement performed  [] Yes [x] No   Selective debridement performed           [x] Yes [] No   Mechanical debridement performed         [] Yes [x] No  Silver nitrate applied                                     [] Yes [x] No  Labs ordered this visit                                  [] Yes [x] No   Imaging ordered this visit                           [] Yes [x] No   Tissue pathology and/or culture taken         *mechanical debridement with gauze and cleaning solution*    MEDICATIONS    Current Outpatient Medications:     CENTRUM S[facility] 8 mg iron-400 mcg-300 mcg Tab, Take 1 tablet by mouth., Disp: , Rfl:     ELIQUIS 2.5 mg Tab, , Disp: , Rfl:     ezetimibe (ZETIA) 10 mg tablet, , Disp: , Rfl:     ferrous gluconate (FERGON) 240 (27 FE) MG tablet, Take 240  mg by mouth., Disp: , Rfl:     gemfibroziL (LOPID) 600 MG tablet, Take 600 mg by mouth., Disp: , Rfl:     glimepiride (AMARYL) 1 MG tablet, , Disp: , Rfl:     HYDROcodone-acetaminophen (NORCO) 5-325 mg per tablet, , Disp: , Rfl:     JANUVIA 100 mg Tab, , Disp: , Rfl:     levothyroxine (SYNTHROID) 50 MCG tablet, Take 50 mcg by mouth before breakfast., Disp: , Rfl:     metFORMIN (GLUCOPHAGE) 500 MG tablet, Take 500 mg by mouth 2 (two) times daily., Disp: , Rfl:     metoprolol succinate (TOPROL-XL) 25 MG 24 hr tablet, Take 25 mg by mouth 2 (two) times daily., Disp: , Rfl:     omeprazole (PRILOSEC) 20 MG capsule, Take 20 mg by mouth once daily., Disp: , Rfl:     OZEMPIC 0.25 mg or 0.5 mg(2 mg/1.5 mL) pen injector, , Disp: , Rfl:     pregabalin (LYRICA) 100 MG capsule, , Disp: , Rfl:     torsemide (DEMADEX) 20 MG Tab, , Disp: , Rfl:     venlafaxine (EFFEXOR) 100 MG Tab, , Disp: , Rfl:     VITAMIN D3 50 mcg (2,000 unit) Cap capsule, Take 2,000 Units by mouth., Disp: , Rfl:     zinc gluconate 50 mg tablet, Take 50 mg by mouth once daily., Disp: , Rfl:    Review of patient's allergies indicates:   Allergen Reactions    Augmentin [amoxicillin-pot clavulanate]     Cefaclor     Cephalexin     Penicillins     Sulfa (sulfonamide antibiotics)     Sulfamethoxazole-trimethoprim Hives         HOME HEALTH AGENCY:  N/A  TIMES PER WEEK/DAYS:    WOUND CARE ORDERS:  Keep clean and dry, may leave open to air.     Follow up in about 1 week (around 5/24/2023) for right buttock check .

## 2023-05-24 ENCOUNTER — HOSPITAL ENCOUNTER (OUTPATIENT)
Dept: WOUND CARE | Facility: HOSPITAL | Age: 73
Discharge: HOME | End: 2023-05-24
Attending: FAMILY MEDICINE
Payer: MEDICARE

## 2023-05-24 VITALS
HEART RATE: 80 BPM | WEIGHT: 230 LBS | DIASTOLIC BLOOD PRESSURE: 86 MMHG | SYSTOLIC BLOOD PRESSURE: 117 MMHG | HEIGHT: 67 IN | RESPIRATION RATE: 18 BRPM | BODY MASS INDEX: 36.1 KG/M2

## 2023-05-24 DIAGNOSIS — L89.312 PRESSURE INJURY OF RIGHT BUTTOCK, STAGE 2: Primary | ICD-10-CM

## 2023-05-24 PROCEDURE — 27000999 HC MEDICAL RECORD PHOTO DOCUMENTATION

## 2023-05-24 PROCEDURE — 99213 OFFICE O/P EST LOW 20 MIN: CPT

## 2023-05-24 NOTE — PROGRESS NOTES
Subjective:       Patient ID: Kaila Hammond is a 72 y.o. female.    Chief Complaint: Pressure Ulcer (Right buttock - stage 2 )    HPI    73yo F with PMHx of HTN, HLD, DMII (most recent HBA1c 12.0%), CKD, hypothyroidism CAD s/p CABG 10/27/21 with extensive recovery requiring 6+ months of rehab.   Patient reports while she was in rehab, she developed wounds on her sacrum, right buttock and toes.   Most of these wounds have healed since she has been home.   She had been using gentamicin ointment on the remaining right buttock wound. Great improvement with gentle foam border with collagen.       Wound evaluated today, healed with some eschar. F/u PRN       Right Buttock  4/5/23  0.6 x 1.5 x 0.2  4/12/23 0.9 x 1.5 x 0.2  5/17/23 0.3 x 0.5 x 0.2  5/24/23 healed      Review of Systems   Constitutional:  Negative for chills and fever.   Respiratory:  Negative for cough.    Cardiovascular:  Negative for chest pain.   Skin:  Positive for wound.       Objective:      Vitals:    05/24/23 0833   BP: 117/86   Pulse: 80   Resp: 18       Physical Exam  Vitals reviewed.   Constitutional:       Appearance: Normal appearance.   HENT:      Head: Normocephalic and atraumatic.   Eyes:      Extraocular Movements: Extraocular movements intact.      Pupils: Pupils are equal, round, and reactive to light.   Cardiovascular:      Rate and Rhythm: Normal rate.   Pulmonary:      Effort: Pulmonary effort is normal.   Skin:     Comments: Wound as described and pictured   Neurological:      Mental Status: She is alert.          4/5/23       Pre       Post      4/12/23 5/17/23           Pre        Post      5/24/23       Pre       Post              Assessment:         ICD-10-CM ICD-9-CM   1. Pressure injury of right buttock, stage 2  L89.312 707.05     707.22           Procedures:           [] Yes [x] No   I & D performed  [] Yes [x] No   Excisional debridement performed  [] Yes [x] No   Selective debridement performed           [x] Yes  [] No   Mechanical debridement performed         [] Yes [x] No  Silver nitrate applied                                     [] Yes [x] No  Labs ordered this visit                                  [] Yes [x] No   Imaging ordered this visit                           [] Yes [x] No   Tissue pathology and/or culture taken         *mechanical debridement with gauze and cleaning solution*    MEDICATIONS    Current Outpatient Medications:     CENTRUM [facility] 8 mg iron-400 mcg-300 mcg Tab, Take 1 tablet by mouth., Disp: , Rfl:     ELIQUIS 2.5 mg Tab, , Disp: , Rfl:     ezetimibe (ZETIA) 10 mg tablet, , Disp: , Rfl:     ferrous gluconate (FERGON) 240 (27 FE) MG tablet, Take 240 mg by mouth., Disp: , Rfl:     gemfibroziL (LOPID) 600 MG tablet, Take 600 mg by mouth., Disp: , Rfl:     glimepiride (AMARYL) 1 MG tablet, , Disp: , Rfl:     HYDROcodone-acetaminophen (NORCO) 5-325 mg per tablet, , Disp: , Rfl:     JANUVIA 100 mg Tab, , Disp: , Rfl:     levothyroxine (SYNTHROID) 50 MCG tablet, Take 50 mcg by mouth before breakfast., Disp: , Rfl:     metFORMIN (GLUCOPHAGE) 500 MG tablet, Take 500 mg by mouth 2 (two) times daily., Disp: , Rfl:     metoprolol succinate (TOPROL-XL) 25 MG 24 hr tablet, Take 25 mg by mouth 2 (two) times daily., Disp: , Rfl:     omeprazole (PRILOSEC) 20 MG capsule, Take 20 mg by mouth once daily., Disp: , Rfl:     OZEMPIC 0.25 mg or 0.5 mg(2 mg/1.5 mL) pen injector, , Disp: , Rfl:     pregabalin (LYRICA) 100 MG capsule, , Disp: , Rfl:     torsemide (DEMADEX) 20 MG Tab, , Disp: , Rfl:     venlafaxine (EFFEXOR) 100 MG Tab, , Disp: , Rfl:     VITAMIN D3 50 mcg (2,000 unit) Cap capsule, Take 2,000 Units by mouth., Disp: , Rfl:     zinc gluconate 50 mg tablet, Take 50 mg by mouth once daily., Disp: , Rfl:    Review of patient's allergies indicates:   Allergen Reactions    Augmentin [amoxicillin-pot clavulanate]     Cefaclor     Cephalexin     Penicillins     Sulfa (sulfonamide antibiotics)      Sulfamethoxazole-trimethoprim Hives         HOME HEALTH AGENCY:  N/A  TIMES PER WEEK/DAYS:    WOUND CARE ORDERS:  Keep clean and dry, may leave open to air.     No follow-ups on file.

## 2023-11-04 ENCOUNTER — HOSPITAL ENCOUNTER (INPATIENT)
Facility: HOSPITAL | Age: 73
LOS: 11 days | Discharge: HOME | DRG: 871 | End: 2023-11-15
Attending: INTERNAL MEDICINE | Admitting: INTERNAL MEDICINE
Payer: MEDICARE

## 2023-11-04 DIAGNOSIS — N17.9 AKI (ACUTE KIDNEY INJURY): ICD-10-CM

## 2023-11-04 DIAGNOSIS — R06.00 DYSPNEA AND RESPIRATORY ABNORMALITIES: ICD-10-CM

## 2023-11-04 DIAGNOSIS — N30.00 ACUTE CYSTITIS WITHOUT HEMATURIA: Primary | ICD-10-CM

## 2023-11-04 DIAGNOSIS — R09.02 HYPOXEMIA: ICD-10-CM

## 2023-11-04 DIAGNOSIS — R06.89 DYSPNEA AND RESPIRATORY ABNORMALITIES: ICD-10-CM

## 2023-11-04 DIAGNOSIS — G93.41 ENCEPHALOPATHY, METABOLIC: ICD-10-CM

## 2023-11-04 DIAGNOSIS — R50.9 FEVER: ICD-10-CM

## 2023-11-04 DIAGNOSIS — I73.9 PVD (PERIPHERAL VASCULAR DISEASE): ICD-10-CM

## 2023-11-04 DIAGNOSIS — G62.9 POLYNEUROPATHY: ICD-10-CM

## 2023-11-04 LAB
ALBUMIN SERPL-MCNC: 3 G/DL (ref 3.4–4.8)
ALBUMIN/GLOB SERPL: 0.8 RATIO (ref 1.1–2)
ALP SERPL-CCNC: 127 UNIT/L (ref 40–150)
ALT SERPL-CCNC: 28 UNIT/L (ref 0–55)
APPEARANCE UR: CLEAR
AST SERPL-CCNC: 71 UNIT/L (ref 5–34)
BACTERIA #/AREA URNS AUTO: ABNORMAL /HPF
BASOPHILS # BLD AUTO: 0.03 X10(3)/MCL
BASOPHILS NFR BLD AUTO: 0.2 %
BILIRUB SERPL-MCNC: 1.7 MG/DL
BILIRUB UR QL STRIP.AUTO: ABNORMAL
BUN SERPL-MCNC: 41 MG/DL (ref 9.8–20.1)
CALCIUM SERPL-MCNC: 9.1 MG/DL (ref 8.4–10.2)
CHLORIDE SERPL-SCNC: 91 MMOL/L (ref 98–107)
CO2 SERPL-SCNC: 27 MMOL/L (ref 23–31)
COLOR UR AUTO: YELLOW
CREAT SERPL-MCNC: 2.34 MG/DL (ref 0.55–1.02)
EOSINOPHIL # BLD AUTO: 0.02 X10(3)/MCL (ref 0–0.9)
EOSINOPHIL NFR BLD AUTO: 0.2 %
ERYTHROCYTE [DISTWIDTH] IN BLOOD BY AUTOMATED COUNT: 16 % (ref 11.5–17)
FLUAV AG UPPER RESP QL IA.RAPID: NOT DETECTED
FLUBV AG UPPER RESP QL IA.RAPID: NOT DETECTED
GFR SERPLBLD CREATININE-BSD FMLA CKD-EPI: 22 MLS/MIN/1.73/M2
GLOBULIN SER-MCNC: 3.9 GM/DL (ref 2.4–3.5)
GLUCOSE SERPL-MCNC: 119 MG/DL (ref 82–115)
GLUCOSE UR QL STRIP.AUTO: NEGATIVE
HCT VFR BLD AUTO: 36.5 % (ref 37–47)
HGB BLD-MCNC: 11.2 G/DL (ref 12–16)
IMM GRANULOCYTES # BLD AUTO: 0.05 X10(3)/MCL (ref 0–0.04)
IMM GRANULOCYTES NFR BLD AUTO: 0.4 %
KETONES UR QL STRIP.AUTO: NEGATIVE
LACTATE SERPL-SCNC: 1.7 MMOL/L (ref 0.5–2.2)
LEUKOCYTE ESTERASE UR QL STRIP.AUTO: ABNORMAL
LYMPHOCYTES # BLD AUTO: 0.73 X10(3)/MCL (ref 0.6–4.6)
LYMPHOCYTES NFR BLD AUTO: 5.9 %
MCH RBC QN AUTO: 26.4 PG (ref 27–31)
MCHC RBC AUTO-ENTMCNC: 30.7 G/DL (ref 33–36)
MCV RBC AUTO: 86.1 FL (ref 80–94)
MONOCYTES # BLD AUTO: 1.4 X10(3)/MCL (ref 0.1–1.3)
MONOCYTES NFR BLD AUTO: 11.4 %
NEUTROPHILS # BLD AUTO: 10.04 X10(3)/MCL (ref 2.1–9.2)
NEUTROPHILS NFR BLD AUTO: 81.9 %
NITRITE UR QL STRIP.AUTO: POSITIVE
PH UR STRIP.AUTO: 5.5 [PH]
PLATELET # BLD AUTO: 172 X10(3)/MCL (ref 130–400)
PMV BLD AUTO: 10.5 FL (ref 7.4–10.4)
POTASSIUM SERPL-SCNC: 3.7 MMOL/L (ref 3.5–5.1)
PROT SERPL-MCNC: 6.9 GM/DL (ref 5.8–7.6)
PROT UR QL STRIP.AUTO: ABNORMAL
RBC # BLD AUTO: 4.24 X10(6)/MCL (ref 4.2–5.4)
RBC #/AREA URNS AUTO: ABNORMAL /HPF
RBC UR QL AUTO: ABNORMAL
RSV A 5' UTR RNA NPH QL NAA+PROBE: NOT DETECTED
SARS-COV-2 RNA RESP QL NAA+PROBE: NOT DETECTED
SODIUM SERPL-SCNC: 131 MMOL/L (ref 136–145)
SP GR UR STRIP.AUTO: 1.01 (ref 1–1.03)
SQUAMOUS #/AREA URNS AUTO: ABNORMAL /HPF
UROBILINOGEN UR STRIP-ACNC: 1
WBC # SPEC AUTO: 12.27 X10(3)/MCL (ref 4.5–11.5)
WBC #/AREA URNS AUTO: ABNORMAL /HPF

## 2023-11-04 PROCEDURE — 99285 EMERGENCY DEPT VISIT HI MDM: CPT | Mod: 25

## 2023-11-04 PROCEDURE — 85025 COMPLETE CBC W/AUTO DIFF WBC: CPT | Performed by: INTERNAL MEDICINE

## 2023-11-04 PROCEDURE — 82550 ASSAY OF CK (CPK): CPT | Performed by: STUDENT IN AN ORGANIZED HEALTH CARE EDUCATION/TRAINING PROGRAM

## 2023-11-04 PROCEDURE — 83605 ASSAY OF LACTIC ACID: CPT | Performed by: INTERNAL MEDICINE

## 2023-11-04 PROCEDURE — 80053 COMPREHEN METABOLIC PANEL: CPT | Performed by: INTERNAL MEDICINE

## 2023-11-04 PROCEDURE — 25000003 PHARM REV CODE 250: Performed by: INTERNAL MEDICINE

## 2023-11-04 PROCEDURE — 87040 BLOOD CULTURE FOR BACTERIA: CPT | Performed by: INTERNAL MEDICINE

## 2023-11-04 PROCEDURE — P9612 CATHETERIZE FOR URINE SPEC: HCPCS

## 2023-11-04 PROCEDURE — 27000221 HC OXYGEN, UP TO 24 HOURS

## 2023-11-04 PROCEDURE — 81001 URINALYSIS AUTO W/SCOPE: CPT | Performed by: INTERNAL MEDICINE

## 2023-11-04 PROCEDURE — 87086 URINE CULTURE/COLONY COUNT: CPT | Performed by: INTERNAL MEDICINE

## 2023-11-04 PROCEDURE — 0241U COVID/RSV/FLU A&B PCR: CPT | Performed by: INTERNAL MEDICINE

## 2023-11-04 PROCEDURE — 63600175 PHARM REV CODE 636 W HCPCS: Performed by: INTERNAL MEDICINE

## 2023-11-04 PROCEDURE — 87077 CULTURE AEROBIC IDENTIFY: CPT | Performed by: INTERNAL MEDICINE

## 2023-11-04 PROCEDURE — 94761 N-INVAS EAR/PLS OXIMETRY MLT: CPT

## 2023-11-04 PROCEDURE — 96365 THER/PROPH/DIAG IV INF INIT: CPT

## 2023-11-04 PROCEDURE — 87154 CUL TYP ID BLD PTHGN 6+ TRGT: CPT | Performed by: INTERNAL MEDICINE

## 2023-11-04 PROCEDURE — 11000001 HC ACUTE MED/SURG PRIVATE ROOM

## 2023-11-04 RX ORDER — GLUCAGON 1 MG
1 KIT INJECTION
Status: DISCONTINUED | OUTPATIENT
Start: 2023-11-04 | End: 2023-11-06

## 2023-11-04 RX ORDER — SODIUM CHLORIDE 9 MG/ML
1000 INJECTION, SOLUTION INTRAVENOUS
Status: COMPLETED | OUTPATIENT
Start: 2023-11-04 | End: 2023-11-04

## 2023-11-04 RX ORDER — INSULIN ASPART 100 [IU]/ML
0-5 INJECTION, SOLUTION INTRAVENOUS; SUBCUTANEOUS
Status: DISCONTINUED | OUTPATIENT
Start: 2023-11-04 | End: 2023-11-05

## 2023-11-04 RX ORDER — SODIUM CHLORIDE 9 MG/ML
INJECTION, SOLUTION INTRAVENOUS CONTINUOUS
Status: DISCONTINUED | OUTPATIENT
Start: 2023-11-04 | End: 2023-11-05

## 2023-11-04 RX ORDER — SODIUM CHLORIDE 0.9 % (FLUSH) 0.9 %
10 SYRINGE (ML) INJECTION
Status: DISCONTINUED | OUTPATIENT
Start: 2023-11-04 | End: 2023-11-15 | Stop reason: HOSPADM

## 2023-11-04 RX ORDER — IBUPROFEN 200 MG
16 TABLET ORAL
Status: DISCONTINUED | OUTPATIENT
Start: 2023-11-04 | End: 2023-11-06

## 2023-11-04 RX ORDER — IBUPROFEN 200 MG
24 TABLET ORAL
Status: DISCONTINUED | OUTPATIENT
Start: 2023-11-04 | End: 2023-11-06

## 2023-11-04 RX ORDER — LEVOFLOXACIN 5 MG/ML
750 INJECTION, SOLUTION INTRAVENOUS
Status: DISCONTINUED | OUTPATIENT
Start: 2023-11-04 | End: 2023-11-05

## 2023-11-04 RX ORDER — ACETAMINOPHEN 500 MG
1000 TABLET ORAL
Status: COMPLETED | OUTPATIENT
Start: 2023-11-04 | End: 2023-11-04

## 2023-11-04 RX ADMIN — SODIUM CHLORIDE 1000 ML: 9 INJECTION, SOLUTION INTRAVENOUS at 06:11

## 2023-11-04 RX ADMIN — ACETAMINOPHEN 1000 MG: 500 TABLET, FILM COATED ORAL at 05:11

## 2023-11-04 RX ADMIN — LEVOFLOXACIN 500 MG: 750 INJECTION, SOLUTION INTRAVENOUS at 05:11

## 2023-11-04 NOTE — ED PROVIDER NOTES
11/04/2023         5:01 PM    Source of History:  History obtained from the patient.     Chief complaint:  From Nurse Triage:  Altered Mental Status (Cough, confusion, started today fever 103.1 upon arrival)    HISTORY OF PRESENT ILLNES:  Kaila Hammond is a 73 y.o. female  has a past medical history of A-fib, Chronic kidney disease, CNS lymphoma, Depression, DM (diabetes mellitus), Essential (primary) hypertension, Mixed hyperlipidemia, Obesity, Peripheral vascular disease, Personal history of colonic polyps, Polyneuropathy, and Sleep apnea. presenting with Altered Mental Status (Cough, confusion, started today fever 103.1 upon arrival)      REVIEW OF SYSTEMS:   Constitutional symptoms:  Noted to have Fever on arrival in ER. No Chills, confusion    Skin symptoms:  No Rash.    Eye symptoms:  No Visual disturbance reported.   ENMT symptoms:  No Sore throat,    Respiratory symptoms:  No Shortness of Breath, Cough for a long time according to patient, no Wheezing.    Cardiovascular symptoms:  No Chest Pain, No Palpitations.   Gastrointestinal symptoms:  No Abdominal Pain, No Nausea, No Vomiting, No Diarrhea, No Constipation.    Genitourinary symptoms:  No Dysuria,    Musculoskeletal symptoms:  No Back pain,    Neurologic symptoms:  No Headache, No Dizziness.  Generalized weakness for 3 days   Psychiatric symptoms:  No Anxiety, No Depression, No Substance Abuse.              Additional review of systems information: Patient Denies Any Other Complaints.    All Other Systems Reviewed With Patient And Negative.    ALLEGIES:  Review of patient's allergies indicates:   Allergen Reactions    Augmentin [amoxicillin-pot clavulanate]     Cefaclor     Cephalexin     Penicillins     Sulfa (sulfonamide antibiotics)     Sulfamethoxazole-trimethoprim Hives       MEDICINE LIST:  Current Outpatient Medications   Medication Instructions    CENTRUM SILVER WOMEN 8 mg iron-400 mcg-300 mcg Tab 1 tablet, Oral    ELIQUIS 2.5 mg Tab No  dose, route, or frequency recorded.    ezetimibe (ZETIA) 10 mg tablet No dose, route, or frequency recorded.    ferrous gluconate (FERGON) 240 mg, Oral    gemfibroziL (LOPID) 600 mg, Oral    glimepiride (AMARYL) 1 MG tablet No dose, route, or frequency recorded.    HYDROcodone-acetaminophen (NORCO) 5-325 mg per tablet No dose, route, or frequency recorded.    JANUVIA 100 mg Tab No dose, route, or frequency recorded.    levothyroxine (SYNTHROID) 50 mcg, Oral, Before breakfast    metFORMIN (GLUCOPHAGE) 500 mg, Oral, 2 times daily    metoprolol succinate (TOPROL-XL) 25 mg, Oral, 2 times daily    omeprazole (PRILOSEC) 20 mg, Oral, Daily    OZEMPIC 0.25 mg or 0.5 mg(2 mg/1.5 mL) pen injector No dose, route, or frequency recorded.    pregabalin (LYRICA) 100 MG capsule No dose, route, or frequency recorded.    torsemide (DEMADEX) 20 MG Tab No dose, route, or frequency recorded.    venlafaxine (EFFEXOR) 100 MG Tab No dose, route, or frequency recorded.    VITAMIN D3 2,000 Units, Oral    zinc gluconate 50 mg, Oral, Daily        PMH:  As per HPI and below:    Reviewed and updated in chart.    PAST MEDICAL HISTORY:  Past Medical History:   Diagnosis Date    A-fib     Chronic kidney disease     CNS lymphoma     Depression     DM (diabetes mellitus)     Essential (primary) hypertension     Mixed hyperlipidemia     Obesity     Peripheral vascular disease     Personal history of colonic polyps     Polyneuropathy     Sleep apnea         PAST SURGICAL HISTORY:  Past Surgical History:   Procedure Laterality Date    ANTERIOR LUMBAR INTERBODY FUSION (ALIF)      APPENDECTOMY      BRAIN TUMOR EXCISION      CARPAL TUNNEL RELEASE Bilateral     CATARACT EXTRACTION Bilateral     CHOLECYSTECTOMY      CORONARY ARTERY BYPASS GRAFT      KNEE ARTHROSCOPY Left     LAMINECTOMY      MINIMALLY INVASIVE TRANSFORAMINAL LUMBAR INTERBODY FUSION (TLIF)      TOTAL ABDOMINAL HYSTERECTOMY W/ BILATERAL SALPINGOOPHORECTOMY      TYMPANOPLASTY WITH MASTOIDECTOMY       and removal of cholesteotoma       SOCIAL HISTORY:  Social History     Tobacco Use    Smoking status: Former     Types: Cigarettes    Smokeless tobacco: Never   Substance Use Topics    Alcohol use: Never    Drug use: Never       FAMILY HISTORY:  Family History   Problem Relation Age of Onset    Hypertension Mother     Ovarian cancer Mother     Diabetes Mellitus Mother     Rectal cancer Father         PROBLEM LIST:  There is no problem list on file for this patient.       PHYSICAL EXAM:      ED Triage Vitals [11/04/23 1652]   BP (!) 137/95   Pulse 106   Resp 19   Temp (!) 103.1 °F (39.5 °C)   SpO2 (!) 90 %        Vital Signs: Reviewed As In Chart.  General:  Alert, No Cardiorespiratory Distress Noted.   Eye:  Extraocular Movements Are Intact.   ENT: Mucus membranes are moist.   Cardiovascular:  Regular Rate And Rhythm, No Murmur, No Pedal Edema.    Respiratory:  Respirations Nonlabored, No Respiratory Distress, Good Bilateral Air Entry, No Rales, No Rhonchi.    Gastrointestinal:  Soft, Non Distended, Non Tenderness, Normal Bowel Sounds.    Neurological:  Alert And Oriented To Person, Place, Time, And Situation, Normal Motor Observed, Normal Speech Observed.  Musculoskeletal:  No Gross Deformity Noted.  Some bruising noted on extremities, which patient had refused to recurrent falling    Psychiatric:  Cooperative.      ED WORKUP FOR MEDICAL DECISION MAKING:    ED ORDERS:  Orders Placed This Encounter   Procedures    Blood culture #1 **CANNOT BE ORDERED STAT**    Blood culture #2 **CANNOT BE ORDERED STAT**    Urine culture    X-Ray Chest 1 View    CT Head Without Contrast    Comprehensive metabolic panel    CBC auto differential    Urinalysis, Reflex to Urine Culture    COVID/RSV/FLU A&B PCR    Lactic acid, plasma    CBC with Differential    Urinalysis, Microscopic    CK    Comprehensive metabolic panel    CBC auto differential    Diet diabetic    Straight Cath (In and Out)    Vital signs    Intake and output     Notify physician     Place sequential compression device    Cardiac Monitoring - Adult    If any glucose result is less than 50 or greater than 400:    If 2nd result is less than 50 or greater than 400:    If glucose greater than 400 mg/dL treat per correction scale.  If glucose remains elevated above 400 mg/dL at next scheduled check, notify provider    Do not admin Aspart correction between scheduled prandial Aspart    Recheck Blood Glucose:    Full code    PT evaluate and treat    Pulse Oximetry Q4H    Insert Saline lock IV    Admit to Inpatient       ED MEDICINES:  Medications   levoFLOXacin 750 mg/150 mL IVPB 750 mg (500 mg Intravenous New Bag 11/4/23 1756)   sodium chloride 0.9% flush 10 mL (has no administration in time range)   0.9%  NaCl infusion (has no administration in time range)   glucose chewable tablet 16 g (has no administration in time range)   glucose chewable tablet 24 g (has no administration in time range)   glucagon (human recombinant) injection 1 mg (has no administration in time range)   insulin aspart U-100 injection 0-5 Units (has no administration in time range)   dextrose 10% bolus 125 mL 125 mL (has no administration in time range)   dextrose 10% bolus 250 mL 250 mL (has no administration in time range)   0.9%  NaCl infusion (1,000 mLs Intravenous New Bag 11/4/23 1803)   acetaminophen tablet 1,000 mg (1,000 mg Oral Given 11/4/23 1748)                ED LABS ORDERED AND REVIEWED:  Admission on 11/04/2023   Component Date Value Ref Range Status    Sodium Level 11/04/2023 131 (L)  136 - 145 mmol/L Final    Potassium Level 11/04/2023 3.7  3.5 - 5.1 mmol/L Final    Chloride 11/04/2023 91 (L)  98 - 107 mmol/L Final    Carbon Dioxide 11/04/2023 27  23 - 31 mmol/L Final    Glucose Level 11/04/2023 119 (H)  82 - 115 mg/dL Final    Blood Urea Nitrogen 11/04/2023 41.0 (H)  9.8 - 20.1 mg/dL Final    Creatinine 11/04/2023 2.34 (H)  0.55 - 1.02 mg/dL Final    Calcium Level Total 11/04/2023 9.1  8.4 -  10.2 mg/dL Final    Protein Total 11/04/2023 6.9  5.8 - 7.6 gm/dL Final    Albumin Level 11/04/2023 3.0 (L)  3.4 - 4.8 g/dL Final    Globulin 11/04/2023 3.9 (H)  2.4 - 3.5 gm/dL Final    Albumin/Globulin Ratio 11/04/2023 0.8 (L)  1.1 - 2.0 ratio Final    Bilirubin Total 11/04/2023 1.7 (H)  <=1.5 mg/dL Final    Alkaline Phosphatase 11/04/2023 127  40 - 150 unit/L Final    Alanine Aminotransferase 11/04/2023 28  0 - 55 unit/L Final    Aspartate Aminotransferase 11/04/2023 71 (H)  5 - 34 unit/L Final    eGFR 11/04/2023 22  mls/min/1.73/m2 Final    Color, UA 11/04/2023 Yellow  Yellow, Light-Yellow, Dark Yellow, Marina, Straw Final    Appearance, UA 11/04/2023 Clear  Clear Final    Specific Gravity, UA 11/04/2023 1.010  1.005 - 1.030 Final    pH, UA 11/04/2023 5.5  5.0 - 8.5 Final    Protein, UA 11/04/2023 3+ (A)  Negative Final    Glucose, UA 11/04/2023 Negative  Negative, Normal Final    Ketones, UA 11/04/2023 Negative  Negative Final    Blood, UA 11/04/2023 3+ (A)  Negative Final    Bilirubin, UA 11/04/2023 1+ (A)  Negative Final    Urobilinogen, UA 11/04/2023 1.0  0.2, 1.0, Normal Final    Nitrites, UA 11/04/2023 Positive (A)  Negative Final    Leukocyte Esterase, UA 11/04/2023 3+ (A)  Negative Final    Influenza A PCR 11/04/2023 Not Detected  Not Detected Final    Influenza B PCR 11/04/2023 Not Detected  Not Detected Final    Respiratory Syncytial Virus PCR 11/04/2023 Not Detected  Not Detected Final    SARS-CoV-2 PCR 11/04/2023 Not Detected  Not Detected, Negative, Invalid Final    Lactic Acid Level 11/04/2023 1.7  0.5 - 2.2 mmol/L Final    WBC 11/04/2023 12.27 (H)  4.50 - 11.50 x10(3)/mcL Final    RBC 11/04/2023 4.24  4.20 - 5.40 x10(6)/mcL Final    Hgb 11/04/2023 11.2 (L)  12.0 - 16.0 g/dL Final    Hct 11/04/2023 36.5 (L)  37.0 - 47.0 % Final    MCV 11/04/2023 86.1  80.0 - 94.0 fL Final    MCH 11/04/2023 26.4 (L)  27.0 - 31.0 pg Final    MCHC 11/04/2023 30.7 (L)  33.0 - 36.0 g/dL Final    RDW 11/04/2023 16.0  11.5  - 17.0 % Final    Platelet 11/04/2023 172  130 - 400 x10(3)/mcL Final    MPV 11/04/2023 10.5 (H)  7.4 - 10.4 fL Final    Neut % 11/04/2023 81.9  % Final    Lymph % 11/04/2023 5.9  % Final    Mono % 11/04/2023 11.4  % Final    Eos % 11/04/2023 0.2  % Final    Basophil % 11/04/2023 0.2  % Final    Lymph # 11/04/2023 0.73  0.6 - 4.6 x10(3)/mcL Final    Neut # 11/04/2023 10.04 (H)  2.1 - 9.2 x10(3)/mcL Final    Mono # 11/04/2023 1.40 (H)  0.1 - 1.3 x10(3)/mcL Final    Eos # 11/04/2023 0.02  0 - 0.9 x10(3)/mcL Final    Baso # 11/04/2023 0.03  <=0.2 x10(3)/mcL Final    IG# 11/04/2023 0.05 (H)  0 - 0.04 x10(3)/mcL Final    IG% 11/04/2023 0.4  % Final    Bacteria, UA 11/04/2023 Few (A)  None Seen, Rare, Occasional /HPF Final    RBC, UA 11/04/2023 6-10 (A)  None Seen, 0-2, 3-5, 0-5 /HPF Final    WBC, UA 11/04/2023 21-50 (A)  None Seen, 0-2, 3-5, 0-5 /HPF Final    Squamous Epithelial Cells, UA 11/04/2023 Rare  None Seen, Rare, Occasional, Occ /HPF Final       RADIOLOGY STUDIES ORDERED AND REVIEWED:  Imaging Results              CT Head Without Contrast (Final result)  Result time 11/04/23 18:41:47      Final result by Chucho Veloz MD (11/04/23 18:41:47)                   Impression:      No acute intracranial abnormality identified.  Findings of mild microvascular ischemic disease.      Electronically signed by: Chucho Veloz  Date:    11/04/2023  Time:    18:41               Narrative:    EXAMINATION:  CT HEAD WITHOUT CONTRAST    CLINICAL HISTORY:  Head trauma, minor (Age >= 65y);    TECHNIQUE:  Low dose axial images were obtained through the head.  Coronal and sagittal reformations were also performed. Contrast was not administered.    Automatic exposure control was utilized to reduce the patient's radiation dose.    DLP= 909    COMPARISON:  11/14/2013    FINDINGS:  No acute intracranial hemorrhage, edema or mass. No acute parenchymal abnormality.    Mild cerebral atrophy with concordant ventricular  enlargement.    There is normal gray white differentiation.    Postsurgical changes of left parietal craniotomy with no acute abnormality.    The mastoid air cells are clear.    Debris noted within the left auditory canal.    The globes and orbital contents are normal bilaterally.    The visualized maxillary, ethmoid and sphenoid sinuses are clear.                                       X-Ray Chest 1 View (Final result)  Result time 11/04/23 17:24:05      Final result by Chucho Veloz MD (11/04/23 17:24:05)                   Impression:      No acute cardiopulmonary process.      Electronically signed by: Chucho Veloz  Date:    11/04/2023  Time:    17:24               Narrative:    EXAMINATION:  XR CHEST 1 VIEW    CLINICAL HISTORY:  Fever, unspecified    TECHNIQUE:  Single view of the chest    COMPARISON:  08/30/2022    FINDINGS:  No focal opacification, pleural effusion, or pneumothorax.    Cardiomegaly with postsurgical changes of median sternotomy.    No acute osseous abnormality.                                      MEDICAL DECISION MAKING:    Kaila Hammond is 73 y.o. female who  has a past medical history of A-fib, Chronic kidney disease, CNS lymphoma, Depression, DM (diabetes mellitus), Essential (primary) hypertension, Mixed hyperlipidemia, Obesity, Peripheral vascular disease, Personal history of colonic polyps, Polyneuropathy, and Sleep apnea. arrives in ER with c/o Altered Mental Status (Cough, confusion, started today fever 103.1 upon arrival)  Patient essentially has fever with some altered mental status, she is able to give history, she says she is been coughing for a long time but nobody is paying attention to that, and for the last 3 days she is been having some generalized weakness, today she was confused so they sent her to the emergency room and on arrival in the emergency room it was noted that she has fever, patient says that she did not know that she is running fever.    Patient's  documented ejection fraction is 40-45%, she has history of coronary artery disease, she is not in any respiratory distress but she was hypoxic 90% on room air, she is doing better on oxygen, she does have mild tachycardia of 100 which appears to be sinus rhythm, I will go ahead and do a sepsis workup on her, I do not want to give her a fluid bolus at this time as I do not know if she can tolerate 30 mL/kg bolus, will decide about that once we have a better picture of her current condition.  I will give her antibiotic after drawing blood cultures and urine      Reviewed Nurses Note. Reviewed Vital Signs.     Reviewed Pertinent old records, History and updated as necessary.    Vitals:    11/04/23 1839   BP: 133/74   Pulse: 88   Resp: (!) 21   Temp: 99.1 °F (37.3 °C)        Medical Decision Making  Problems Addressed:  Acute cystitis without hematuria: acute illness or injury  DORCAS (acute kidney injury): acute illness or injury    Amount and/or Complexity of Data Reviewed  Labs: ordered. Decision-making details documented in ED Course.  Radiology: ordered.    Risk  OTC drugs.  Prescription drug management.  Decision regarding hospitalization.              ED Course as of 11/04/23 1904   Sat Nov 04, 2023   1707 I will transfer the care over to Dr. Landry Nelson, at 18:00 PM.   [GQ]   1900 I, Landry Nelson M.D., I have assumed care of this patient at 6:00 p.m..  This is a 73-year-old female with a past medical history of CAD with CABG 2 years ago, hypertension diabetes hyperlipidemia obesity and history of CNS lymphoma status post radiation multiple years ago with no residual disease who presents emergency department for increasing confusion, falls over last 2 days.  Patient was worked up by the previous provider which showed a urinary tract infection, DORCAS and mild hyponatremia.  She received Levaquin secondary to allergy profile as well as some IVF.  Patient feels much better.  Will admit to patient's primary care.   She is on Eliquis and head CT was performed which was negative.  Patient is neurovascularly intact.  She is able to move all extremities fully equally.  She is no C/T/L-spine tenderness. [BS]   1902 Dr. Garduno on call for Dr. Lucas agrees with admission [BS]   1904 Sodium(!): 131 [BS]   1904 BUN(!): 41.0 [BS]   1904 Creatinine(!): 2.34 [BS]   1904 WBC(!): 12.27 [BS]   1904 NITRITE UA(!): Positive [BS]   1904 Leukocytes, UA(!): 3+ [BS]   1904 Bacteria, UA(!): Few [BS]   1904 WBC, UA(!): 21-50 [BS]      ED Course User Index  [BS] Landry Nelson MD  [GQ] Prashant Lechuga MD            PROCEDURES PERFORMED IN ED:  Procedures    DIAGNOSTIC IMPRESSION:        ICD-10-CM ICD-9-CM   1. Acute cystitis without hematuria  N30.00 595.0   2. Fever  R50.9 780.60   3. DORCAS (acute kidney injury)  N17.9 584.9         ED Disposition Condition    Admit Stable               Medication List        ASK your doctor about these medications      CENTRUM SILVER WOMEN 8 mg iron-400 mcg-50 mcg Tab  Generic drug: multivit-min-iron-FA-vit K-lut     ELIQUIS 2.5 mg Tab  Generic drug: apixaban     ezetimibe 10 mg tablet  Commonly known as: ZETIA     ferrous gluconate 240 (27 FE) MG tablet  Commonly known as: FERGON     gemfibroziL 600 MG tablet  Commonly known as: LOPID     glimepiride 1 MG tablet  Commonly known as: AMARYL     HYDROcodone-acetaminophen 5-325 mg per tablet  Commonly known as: NORCO     JANUVIA 100 MG Tab  Generic drug: SITagliptin phosphate     levothyroxine 50 MCG tablet  Commonly known as: SYNTHROID     metFORMIN 500 MG tablet  Commonly known as: GLUCOPHAGE     metoprolol succinate 25 MG 24 hr tablet  Commonly known as: TOPROL-XL     omeprazole 20 MG capsule  Commonly known as: PRILOSEC     OZEMPIC 0.25 mg or 0.5 mg(2 mg/1.5 mL) pen injector  Generic drug: semaglutide     pregabalin 100 MG capsule  Commonly known as: LYRICA     torsemide 20 MG Tab  Commonly known as: DEMADEX     venlafaxine 100 MG Tab  Commonly known as:  EFFEXOR     VITAMIN D3 50 mcg (2,000 unit) Cap capsule  Generic drug: cholecalciferol (vitamin D3)     zinc gluconate 50 mg tablet                            Landry Nelson MD  11/04/23 6949

## 2023-11-04 NOTE — Clinical Note
Diagnosis: Acute cystitis without hematuria [078714]   Admitting Provider:: LENO CRUZ III [[id number]]   Reason for IP Medical Treatment  (Clinical interventions that can only be accomplished in the IP setting? ) :: abx, ivf   I certify that Inpatient services for greater than or equal to 2 midnights are medically necessary:: Yes   Plans for Post-Acute care--if anticipated (pick the single best option):: A. No post acute care anticipated at this time

## 2023-11-05 PROBLEM — G93.41 ENCEPHALOPATHY, METABOLIC: Status: ACTIVE | Noted: 2023-11-05

## 2023-11-05 PROBLEM — N17.9 AKI (ACUTE KIDNEY INJURY): Status: ACTIVE | Noted: 2023-11-05

## 2023-11-05 PROBLEM — N30.00 ACUTE CYSTITIS WITHOUT HEMATURIA: Status: ACTIVE | Noted: 2023-11-05

## 2023-11-05 LAB
ALBUMIN SERPL-MCNC: 2.5 G/DL (ref 3.4–4.8)
ALBUMIN SERPL-MCNC: 2.6 G/DL (ref 3.4–4.8)
ALBUMIN/GLOB SERPL: 0.7 RATIO (ref 1.1–2)
ALBUMIN/GLOB SERPL: 0.7 RATIO (ref 1.1–2)
ALLENS TEST: NORMAL
ALP SERPL-CCNC: 108 UNIT/L (ref 40–150)
ALP SERPL-CCNC: 114 UNIT/L (ref 40–150)
ALT SERPL-CCNC: 28 UNIT/L (ref 0–55)
ALT SERPL-CCNC: 31 UNIT/L (ref 0–55)
AST SERPL-CCNC: 62 UNIT/L (ref 5–34)
AST SERPL-CCNC: 70 UNIT/L (ref 5–34)
BASOPHILS # BLD AUTO: 0.02 X10(3)/MCL
BASOPHILS NFR BLD AUTO: 0.2 %
BILIRUB SERPL-MCNC: 1.1 MG/DL
BILIRUB SERPL-MCNC: 1.3 MG/DL
BUN SERPL-MCNC: 40 MG/DL (ref 9.8–20.1)
BUN SERPL-MCNC: 46 MG/DL (ref 9.8–20.1)
CALCIUM SERPL-MCNC: 8.5 MG/DL (ref 8.4–10.2)
CALCIUM SERPL-MCNC: 8.7 MG/DL (ref 8.4–10.2)
CHLORIDE SERPL-SCNC: 93 MMOL/L (ref 98–107)
CHLORIDE SERPL-SCNC: 95 MMOL/L (ref 98–107)
CK SERPL-CCNC: 286 U/L (ref 29–168)
CK SERPL-CCNC: 762 U/L (ref 29–168)
CO2 SERPL-SCNC: 27 MMOL/L (ref 23–31)
CO2 SERPL-SCNC: 29 MMOL/L (ref 23–31)
CREAT SERPL-MCNC: 2.14 MG/DL (ref 0.55–1.02)
CREAT SERPL-MCNC: 2.6 MG/DL (ref 0.55–1.02)
DELSYS: NORMAL
EOSINOPHIL # BLD AUTO: 0.08 X10(3)/MCL (ref 0–0.9)
EOSINOPHIL NFR BLD AUTO: 0.7 %
ERYTHROCYTE [DISTWIDTH] IN BLOOD BY AUTOMATED COUNT: 16 % (ref 11.5–17)
GFR SERPLBLD CREATININE-BSD FMLA CKD-EPI: 19 MLS/MIN/1.73/M2
GFR SERPLBLD CREATININE-BSD FMLA CKD-EPI: 24 MLS/MIN/1.73/M2
GLOBULIN SER-MCNC: 3.6 GM/DL (ref 2.4–3.5)
GLOBULIN SER-MCNC: 3.6 GM/DL (ref 2.4–3.5)
GLUCOSE SERPL-MCNC: 44 MG/DL (ref 82–115)
GLUCOSE SERPL-MCNC: 49 MG/DL (ref 82–115)
HCO3 UR-SCNC: 24.4 MMOL/L (ref 24–28)
HCT VFR BLD AUTO: 34.4 % (ref 37–47)
HGB BLD-MCNC: 10.2 G/DL (ref 12–16)
IMM GRANULOCYTES # BLD AUTO: 0.05 X10(3)/MCL (ref 0–0.04)
IMM GRANULOCYTES NFR BLD AUTO: 0.4 %
LYMPHOCYTES # BLD AUTO: 0.61 X10(3)/MCL (ref 0.6–4.6)
LYMPHOCYTES NFR BLD AUTO: 5.4 %
MCH RBC QN AUTO: 26.2 PG (ref 27–31)
MCHC RBC AUTO-ENTMCNC: 29.7 G/DL (ref 33–36)
MCV RBC AUTO: 88.4 FL (ref 80–94)
MONOCYTES # BLD AUTO: 1.24 X10(3)/MCL (ref 0.1–1.3)
MONOCYTES NFR BLD AUTO: 11.1 %
NEUTROPHILS # BLD AUTO: 9.2 X10(3)/MCL (ref 2.1–9.2)
NEUTROPHILS NFR BLD AUTO: 82.2 %
PCO2 BLDA: 39.4 MMHG (ref 35–45)
PH SMN: 7.4 [PH] (ref 7.35–7.45)
PLATELET # BLD AUTO: 154 X10(3)/MCL (ref 130–400)
PMV BLD AUTO: 11.2 FL (ref 7.4–10.4)
PO2 BLDA: 85 MMHG (ref 80–100)
POC BE: 0 MMOL/L
POC SATURATED O2: 96 % (ref 95–100)
POC TCO2: 26 MMOL/L (ref 23–27)
POCT GLUCOSE: 104 MG/DL (ref 70–110)
POCT GLUCOSE: 123 MG/DL (ref 70–110)
POCT GLUCOSE: 162 MG/DL (ref 70–110)
POCT GLUCOSE: 39 MG/DL (ref 70–110)
POCT GLUCOSE: 42 MG/DL (ref 70–110)
POCT GLUCOSE: 46 MG/DL (ref 70–110)
POCT GLUCOSE: 47 MG/DL (ref 70–110)
POCT GLUCOSE: 47 MG/DL (ref 70–110)
POCT GLUCOSE: 48 MG/DL (ref 70–110)
POCT GLUCOSE: 50 MG/DL (ref 70–110)
POCT GLUCOSE: 59 MG/DL (ref 70–110)
POCT GLUCOSE: 60 MG/DL (ref 70–110)
POCT GLUCOSE: 64 MG/DL (ref 70–110)
POCT GLUCOSE: 67 MG/DL (ref 70–110)
POCT GLUCOSE: 83 MG/DL (ref 70–110)
POTASSIUM SERPL-SCNC: 3 MMOL/L (ref 3.5–5.1)
POTASSIUM SERPL-SCNC: 3.4 MMOL/L (ref 3.5–5.1)
PROT SERPL-MCNC: 6.1 GM/DL (ref 5.8–7.6)
PROT SERPL-MCNC: 6.2 GM/DL (ref 5.8–7.6)
RBC # BLD AUTO: 3.89 X10(6)/MCL (ref 4.2–5.4)
SAMPLE: NORMAL
SITE: NORMAL
SODIUM SERPL-SCNC: 132 MMOL/L (ref 136–145)
SODIUM SERPL-SCNC: 134 MMOL/L (ref 136–145)
WBC # SPEC AUTO: 11.2 X10(3)/MCL (ref 4.5–11.5)

## 2023-11-05 PROCEDURE — 85025 COMPLETE CBC W/AUTO DIFF WBC: CPT | Performed by: STUDENT IN AN ORGANIZED HEALTH CARE EDUCATION/TRAINING PROGRAM

## 2023-11-05 PROCEDURE — 80053 COMPREHEN METABOLIC PANEL: CPT | Performed by: FAMILY MEDICINE

## 2023-11-05 PROCEDURE — 25000003 PHARM REV CODE 250: Performed by: INTERNAL MEDICINE

## 2023-11-05 PROCEDURE — 82550 ASSAY OF CK (CPK): CPT | Performed by: FAMILY MEDICINE

## 2023-11-05 PROCEDURE — 63600175 PHARM REV CODE 636 W HCPCS: Performed by: FAMILY MEDICINE

## 2023-11-05 PROCEDURE — 94761 N-INVAS EAR/PLS OXIMETRY MLT: CPT

## 2023-11-05 PROCEDURE — 25000003 PHARM REV CODE 250: Performed by: FAMILY MEDICINE

## 2023-11-05 PROCEDURE — 25000003 PHARM REV CODE 250: Performed by: STUDENT IN AN ORGANIZED HEALTH CARE EDUCATION/TRAINING PROGRAM

## 2023-11-05 PROCEDURE — 20000000 HC ICU ROOM

## 2023-11-05 PROCEDURE — 80053 COMPREHEN METABOLIC PANEL: CPT | Performed by: STUDENT IN AN ORGANIZED HEALTH CARE EDUCATION/TRAINING PROGRAM

## 2023-11-05 RX ORDER — VENLAFAXINE HYDROCHLORIDE 37.5 MG/1
75 CAPSULE, EXTENDED RELEASE ORAL DAILY
Status: DISCONTINUED | OUTPATIENT
Start: 2023-11-05 | End: 2023-11-15 | Stop reason: HOSPADM

## 2023-11-05 RX ORDER — METOPROLOL SUCCINATE 25 MG/1
25 TABLET, EXTENDED RELEASE ORAL 2 TIMES DAILY
Status: DISCONTINUED | OUTPATIENT
Start: 2023-11-05 | End: 2023-11-07

## 2023-11-05 RX ORDER — DEXTROSE MONOHYDRATE 100 MG/ML
INJECTION, SOLUTION INTRAVENOUS CONTINUOUS
Status: DISCONTINUED | OUTPATIENT
Start: 2023-11-05 | End: 2023-11-07

## 2023-11-05 RX ORDER — SODIUM CHLORIDE 0.9 % (FLUSH) 0.9 %
10 SYRINGE (ML) INJECTION EVERY 6 HOURS
Status: DISCONTINUED | OUTPATIENT
Start: 2023-11-06 | End: 2023-11-15 | Stop reason: HOSPADM

## 2023-11-05 RX ORDER — LEVOFLOXACIN 5 MG/ML
750 INJECTION, SOLUTION INTRAVENOUS
Status: DISCONTINUED | OUTPATIENT
Start: 2023-11-06 | End: 2023-11-06

## 2023-11-05 RX ORDER — SODIUM CHLORIDE 0.9 % (FLUSH) 0.9 %
10 SYRINGE (ML) INJECTION
Status: DISCONTINUED | OUTPATIENT
Start: 2023-11-05 | End: 2023-11-15 | Stop reason: HOSPADM

## 2023-11-05 RX ORDER — EZETIMIBE 10 MG/1
10 TABLET ORAL NIGHTLY
Status: DISCONTINUED | OUTPATIENT
Start: 2023-11-05 | End: 2023-11-15 | Stop reason: HOSPADM

## 2023-11-05 RX ORDER — MUPIROCIN 20 MG/G
OINTMENT TOPICAL 2 TIMES DAILY
Status: DISPENSED | OUTPATIENT
Start: 2023-11-05 | End: 2023-11-10

## 2023-11-05 RX ORDER — CHOLECALCIFEROL (VITAMIN D3) 25 MCG
2000 TABLET ORAL DAILY
Status: DISCONTINUED | OUTPATIENT
Start: 2023-11-05 | End: 2023-11-15 | Stop reason: HOSPADM

## 2023-11-05 RX ORDER — POTASSIUM CHLORIDE 20 MEQ/1
20 TABLET, EXTENDED RELEASE ORAL 2 TIMES DAILY
Status: COMPLETED | OUTPATIENT
Start: 2023-11-05 | End: 2023-11-05

## 2023-11-05 RX ORDER — PANTOPRAZOLE SODIUM 40 MG/1
40 TABLET, DELAYED RELEASE ORAL DAILY
Status: DISCONTINUED | OUTPATIENT
Start: 2023-11-05 | End: 2023-11-15 | Stop reason: HOSPADM

## 2023-11-05 RX ORDER — DEXTROSE MONOHYDRATE AND SODIUM CHLORIDE 5; .45 G/100ML; G/100ML
INJECTION, SOLUTION INTRAVENOUS CONTINUOUS
Status: DISCONTINUED | OUTPATIENT
Start: 2023-11-05 | End: 2023-11-05

## 2023-11-05 RX ORDER — DEXTROSE MONOHYDRATE AND SODIUM CHLORIDE 5; .9 G/100ML; G/100ML
INJECTION, SOLUTION INTRAVENOUS CONTINUOUS
Status: DISCONTINUED | OUTPATIENT
Start: 2023-11-05 | End: 2023-11-07

## 2023-11-05 RX ORDER — LEVOTHYROXINE SODIUM 50 UG/1
50 TABLET ORAL
Status: DISCONTINUED | OUTPATIENT
Start: 2023-11-06 | End: 2023-11-15 | Stop reason: HOSPADM

## 2023-11-05 RX ADMIN — MUPIROCIN 1 G: 20 OINTMENT TOPICAL at 08:11

## 2023-11-05 RX ADMIN — DEXTROSE MONOHYDRATE 250 ML: 100 INJECTION, SOLUTION INTRAVENOUS at 04:11

## 2023-11-05 RX ADMIN — MUPIROCIN 1 G: 20 OINTMENT TOPICAL at 01:11

## 2023-11-05 RX ADMIN — DEXTROSE MONOHYDRATE 250 ML: 100 INJECTION, SOLUTION INTRAVENOUS at 01:11

## 2023-11-05 RX ADMIN — APIXABAN 2.5 MG: 2.5 TABLET, FILM COATED ORAL at 01:11

## 2023-11-05 RX ADMIN — SODIUM CHLORIDE: 9 INJECTION, SOLUTION INTRAVENOUS at 04:11

## 2023-11-05 RX ADMIN — APIXABAN 2.5 MG: 2.5 TABLET, FILM COATED ORAL at 08:11

## 2023-11-05 RX ADMIN — DEXTROSE AND SODIUM CHLORIDE: 5; 900 INJECTION, SOLUTION INTRAVENOUS at 11:11

## 2023-11-05 RX ADMIN — DEXTROSE MONOHYDRATE 250 ML: 100 INJECTION, SOLUTION INTRAVENOUS at 10:11

## 2023-11-05 RX ADMIN — VENLAFAXINE HYDROCHLORIDE 75 MG: 37.5 CAPSULE, EXTENDED RELEASE ORAL at 01:11

## 2023-11-05 RX ADMIN — EZETIMIBE 10 MG: 10 TABLET ORAL at 08:11

## 2023-11-05 RX ADMIN — DEXTROSE MONOHYDRATE 250 ML: 100 INJECTION, SOLUTION INTRAVENOUS at 06:11

## 2023-11-05 RX ADMIN — METOPROLOL SUCCINATE 25 MG: 25 TABLET, EXTENDED RELEASE ORAL at 01:11

## 2023-11-05 RX ADMIN — POTASSIUM CHLORIDE 20 MEQ: 1500 TABLET, EXTENDED RELEASE ORAL at 10:11

## 2023-11-05 RX ADMIN — CHOLECALCIFEROL TAB 25 MCG (1000 UNIT) 2000 UNITS: 25 TAB at 01:11

## 2023-11-05 RX ADMIN — METOPROLOL SUCCINATE 25 MG: 25 TABLET, EXTENDED RELEASE ORAL at 08:11

## 2023-11-05 RX ADMIN — PANTOPRAZOLE SODIUM 40 MG: 40 TABLET, DELAYED RELEASE ORAL at 01:11

## 2023-11-05 RX ADMIN — DEXTROSE MONOHYDRATE: 100 INJECTION, SOLUTION INTRAVENOUS at 08:11

## 2023-11-05 RX ADMIN — POTASSIUM CHLORIDE 20 MEQ: 1500 TABLET, EXTENDED RELEASE ORAL at 08:11

## 2023-11-05 NOTE — PROGRESS NOTES
Pharmacist Renal Dose Adjustment Note    Kaila Hammond is a 73 y.o. female being treated with the medication levofloxacin    Patient Data:    Vital Signs (Most Recent):  Temp: 97.5 °F (36.4 °C) (11/05/23 0757)  Pulse: 77 (11/05/23 0757)  Resp: 18 (11/05/23 0757)  BP: 130/73 (11/05/23 0757)  SpO2: 98 % (11/05/23 0757) Vital Signs (72h Range):  Temp:  [96.4 °F (35.8 °C)-103.1 °F (39.5 °C)]   Pulse:  []   Resp:  [15-21]   BP: ()/(60-95)   SpO2:  [90 %-100 %]      Recent Labs   Lab 11/04/23  1706 11/05/23 0313   CREATININE 2.34* 2.60*     Serum creatinine: 2.6 mg/dL (H) 11/05/23 0313  Estimated creatinine clearance: 24.1 mL/min (A)    Medication:levofloxacin dose: 750mg q24h will be changed to levofloxacin dose:750mg q48h    Pharmacist's Name: Ed Diez  Pharmacist's Extension: [id number]

## 2023-11-05 NOTE — PLAN OF CARE
Problem: Infection  Goal: Absence of Infection Signs and Symptoms  Outcome: Ongoing, Progressing     Problem: Adult Inpatient Plan of Care  Goal: Plan of Care Review  Outcome: Ongoing, Progressing  Goal: Patient-Specific Goal (Individualized)  Outcome: Ongoing, Progressing  Goal: Absence of Hospital-Acquired Illness or Injury  Outcome: Ongoing, Progressing  Goal: Optimal Comfort and Wellbeing  Outcome: Ongoing, Progressing  Goal: Readiness for Transition of Care  Outcome: Ongoing, Progressing     Problem: Diabetes Comorbidity  Goal: Blood Glucose Level Within Targeted Range  Outcome: Ongoing, Progressing     Problem: Hypertension Comorbidity  Goal: Blood Pressure in Desired Range  Outcome: Ongoing, Progressing     Problem: UTI (Urinary Tract Infection)  Goal: Improved Infection Symptoms  Outcome: Ongoing, Progressing

## 2023-11-05 NOTE — H&P
HISTORY & PHYSICAL  Hospital Medicine    Team: Networked reference to record PCT     PRESENTING HISTORY     Chief Complaint/Reason for Admission:  frequent falls and altered mental status    History of Present Illness:  Ms. Kaila Hammond is a 73 y.o. female who presented with increased weakness at home over the last few weeks.  She has had 3 falls and most recently was on the ground for 8 hrs.  She presented to the ED and on admission she was confused.  She had a temp of 103.1.  Labs showed an elevated white count and an acute kidney injury.  . Flu/covid/pna and rsv all negative. Blood cultures were drawn prior to antibiotics.  Cath UA in the ER was significant for bacteria, pyuria, nitritie positive.  This morning she is complaining of being unable to void, we bladder scanned her and she has > 1 L residual so a pearce will be placed.    Review of Systems:  ROS    PAST HISTORY:     Past Medical History:   Diagnosis Date    A-fib     Chronic kidney disease     CNS lymphoma     Depression     DM (diabetes mellitus)     Essential (primary) hypertension     Mixed hyperlipidemia     Obesity     Peripheral vascular disease     Personal history of colonic polyps     Polyneuropathy     Sleep apnea        Past Surgical History:   Procedure Laterality Date    ANTERIOR LUMBAR INTERBODY FUSION (ALIF)      APPENDECTOMY      BRAIN TUMOR EXCISION      CARPAL TUNNEL RELEASE Bilateral     CATARACT EXTRACTION Bilateral     CHOLECYSTECTOMY      CORONARY ARTERY BYPASS GRAFT      KNEE ARTHROSCOPY Left     LAMINECTOMY      MINIMALLY INVASIVE TRANSFORAMINAL LUMBAR INTERBODY FUSION (TLIF)      TOTAL ABDOMINAL HYSTERECTOMY W/ BILATERAL SALPINGOOPHORECTOMY      TYMPANOPLASTY WITH MASTOIDECTOMY      and removal of cholesteotoma       Family History   Problem Relation Age of Onset    Hypertension Mother     Ovarian cancer Mother     Diabetes Mellitus Mother     Rectal cancer Father        Social History     Socioeconomic History     Marital status:    Tobacco Use    Smoking status: Former     Types: Cigarettes    Smokeless tobacco: Never   Substance and Sexual Activity    Alcohol use: Never    Drug use: Never       MEDICATIONS & ALLERGIES:     No current facility-administered medications on file prior to encounter.     Current Outpatient Medications on File Prior to Encounter   Medication Sig Dispense Refill    ELIQUIS 2.5 mg Tab       ferrous gluconate (FERGON) 240 (27 FE) MG tablet Take 240 mg by mouth.      gemfibroziL (LOPID) 600 MG tablet Take 600 mg by mouth.      glimepiride (AMARYL) 1 MG tablet       levothyroxine (SYNTHROID) 50 MCG tablet Take 50 mcg by mouth before breakfast.      metoprolol succinate (TOPROL-XL) 25 MG 24 hr tablet Take 25 mg by mouth 2 (two) times daily.      omeprazole (PRILOSEC) 20 MG capsule Take 20 mg by mouth once daily.      OZEMPIC 0.25 mg or 0.5 mg(2 mg/1.5 mL) pen injector       pregabalin (LYRICA) 100 MG capsule       venlafaxine (EFFEXOR) 100 MG Tab       VITAMIN D3 50 mcg (2,000 unit) Cap capsule Take 2,000 Units by mouth.      zinc gluconate 50 mg tablet Take 50 mg by mouth once daily.      CENTRUM SILVER WOMEN 8 mg iron-400 mcg-300 mcg Tab Take 1 tablet by mouth.      ezetimibe (ZETIA) 10 mg tablet       HYDROcodone-acetaminophen (NORCO) 5-325 mg per tablet       JANUVIA 100 mg Tab       metFORMIN (GLUCOPHAGE) 500 MG tablet Take 500 mg by mouth 2 (two) times daily.      torsemide (DEMADEX) 20 MG Tab           Review of patient's allergies indicates:   Allergen Reactions    Augmentin [amoxicillin-pot clavulanate]     Cefaclor     Cephalexin     Penicillins     Sulfa (sulfonamide antibiotics)     Sulfamethoxazole-trimethoprim Hives       OBJECTIVE:     Vital Signs:  Temp:  [96.4 °F (35.8 °C)-103.1 °F (39.5 °C)] 97.5 °F (36.4 °C)  Pulse:  [] 77  Resp:  [15-21] 18  SpO2:  [90 %-100 %] 98 %  BP: ()/(60-95) 130/73  Body mass index is 35.32 kg/m².     Physical Exam:  Physical Exam    Constitutional: She is oriented to person, place, and time. She appears well-developed and well-nourished. No distress.   HENT:   Head: Normocephalic.   Right Ear: External ear normal.   Left Ear: External ear normal.   Eyes: Pupils are equal, round, and reactive to light.   Neck: No tracheal deviation present. No thyromegaly present.   Cardiovascular: Normal rate, regular rhythm and normal heart sounds. Exam reveals no friction rub.   No murmur heard.Pulmonary:      Effort: Pulmonary effort is normal.      Breath sounds: Normal breath sounds.     Abdominal: She exhibits no distension and no mass. There is no rebound and no guarding.   Musculoskeletal:         General: No tenderness or deformity.   Neurological: She is alert and oriented to person, place, and time. No cranial nerve deficit. She exhibits normal muscle tone. Coordination normal.   Skin: Skin is warm and dry. Capillary refill takes less than 2 seconds. She is not diaphoretic. No erythema.   Psychiatric: Her behavior is normal. Judgment and thought content normal.       Laboratory  Recent Results (from the past 24 hour(s))   Comprehensive metabolic panel    Collection Time: 11/04/23  5:06 PM   Result Value Ref Range    Sodium Level 131 (L) 136 - 145 mmol/L    Potassium Level 3.7 3.5 - 5.1 mmol/L    Chloride 91 (L) 98 - 107 mmol/L    Carbon Dioxide 27 23 - 31 mmol/L    Glucose Level 119 (H) 82 - 115 mg/dL    Blood Urea Nitrogen 41.0 (H) 9.8 - 20.1 mg/dL    Creatinine 2.34 (H) 0.55 - 1.02 mg/dL    Calcium Level Total 9.1 8.4 - 10.2 mg/dL    Protein Total 6.9 5.8 - 7.6 gm/dL    Albumin Level 3.0 (L) 3.4 - 4.8 g/dL    Globulin 3.9 (H) 2.4 - 3.5 gm/dL    Albumin/Globulin Ratio 0.8 (L) 1.1 - 2.0 ratio    Bilirubin Total 1.7 (H) <=1.5 mg/dL    Alkaline Phosphatase 127 40 - 150 unit/L    Alanine Aminotransferase 28 0 - 55 unit/L    Aspartate Aminotransferase 71 (H) 5 - 34 unit/L    eGFR 22 mls/min/1.73/m2   CBC with Differential    Collection Time: 11/04/23   5:06 PM   Result Value Ref Range    WBC 12.27 (H) 4.50 - 11.50 x10(3)/mcL    RBC 4.24 4.20 - 5.40 x10(6)/mcL    Hgb 11.2 (L) 12.0 - 16.0 g/dL    Hct 36.5 (L) 37.0 - 47.0 %    MCV 86.1 80.0 - 94.0 fL    MCH 26.4 (L) 27.0 - 31.0 pg    MCHC 30.7 (L) 33.0 - 36.0 g/dL    RDW 16.0 11.5 - 17.0 %    Platelet 172 130 - 400 x10(3)/mcL    MPV 10.5 (H) 7.4 - 10.4 fL    Neut % 81.9 %    Lymph % 5.9 %    Mono % 11.4 %    Eos % 0.2 %    Basophil % 0.2 %    Lymph # 0.73 0.6 - 4.6 x10(3)/mcL    Neut # 10.04 (H) 2.1 - 9.2 x10(3)/mcL    Mono # 1.40 (H) 0.1 - 1.3 x10(3)/mcL    Eos # 0.02 0 - 0.9 x10(3)/mcL    Baso # 0.03 <=0.2 x10(3)/mcL    IG# 0.05 (H) 0 - 0.04 x10(3)/mcL    IG% 0.4 %   CK    Collection Time: 11/04/23  5:06 PM   Result Value Ref Range    Creatine Kinase 762 (H) 29 - 168 U/L   COVID/RSV/FLU A&B PCR    Collection Time: 11/04/23  5:07 PM   Result Value Ref Range    Influenza A PCR Not Detected Not Detected    Influenza B PCR Not Detected Not Detected    Respiratory Syncytial Virus PCR Not Detected Not Detected    SARS-CoV-2 PCR Not Detected Not Detected, Negative, Invalid   Lactic acid, plasma    Collection Time: 11/04/23  5:22 PM   Result Value Ref Range    Lactic Acid Level 1.7 0.5 - 2.2 mmol/L   Urinalysis, Reflex to Urine Culture    Collection Time: 11/04/23  5:39 PM    Specimen: Urine   Result Value Ref Range    Color, UA Yellow Yellow, Light-Yellow, Dark Yellow, Marina, Straw    Appearance, UA Clear Clear    Specific Gravity, UA 1.010 1.005 - 1.030    pH, UA 5.5 5.0 - 8.5    Protein, UA 3+ (A) Negative    Glucose, UA Negative Negative, Normal    Ketones, UA Negative Negative    Blood, UA 3+ (A) Negative    Bilirubin, UA 1+ (A) Negative    Urobilinogen, UA 1.0 0.2, 1.0, Normal    Nitrites, UA Positive (A) Negative    Leukocyte Esterase, UA 3+ (A) Negative   Urinalysis, Microscopic    Collection Time: 11/04/23  5:39 PM   Result Value Ref Range    Bacteria, UA Few (A) None Seen, Rare, Occasional /HPF    RBC, UA 6-10  (A) None Seen, 0-2, 3-5, 0-5 /HPF    WBC, UA 21-50 (A) None Seen, 0-2, 3-5, 0-5 /HPF    Squamous Epithelial Cells, UA Rare None Seen, Rare, Occasional, Occ /HPF   Comprehensive metabolic panel    Collection Time: 11/05/23  3:13 AM   Result Value Ref Range    Sodium Level 134 (L) 136 - 145 mmol/L    Potassium Level 3.0 (L) 3.5 - 5.1 mmol/L    Chloride 93 (L) 98 - 107 mmol/L    Carbon Dioxide 29 23 - 31 mmol/L    Glucose Level 44 (LL) 82 - 115 mg/dL    Blood Urea Nitrogen 46.0 (H) 9.8 - 20.1 mg/dL    Creatinine 2.60 (H) 0.55 - 1.02 mg/dL    Calcium Level Total 8.7 8.4 - 10.2 mg/dL    Protein Total 6.2 5.8 - 7.6 gm/dL    Albumin Level 2.6 (L) 3.4 - 4.8 g/dL    Globulin 3.6 (H) 2.4 - 3.5 gm/dL    Albumin/Globulin Ratio 0.7 (L) 1.1 - 2.0 ratio    Bilirubin Total 1.3 <=1.5 mg/dL    Alkaline Phosphatase 108 40 - 150 unit/L    Alanine Aminotransferase 28 0 - 55 unit/L    Aspartate Aminotransferase 62 (H) 5 - 34 unit/L    eGFR 19 mls/min/1.73/m2   CBC with Differential    Collection Time: 11/05/23  3:13 AM   Result Value Ref Range    WBC 11.20 4.50 - 11.50 x10(3)/mcL    RBC 3.89 (L) 4.20 - 5.40 x10(6)/mcL    Hgb 10.2 (L) 12.0 - 16.0 g/dL    Hct 34.4 (L) 37.0 - 47.0 %    MCV 88.4 80.0 - 94.0 fL    MCH 26.2 (L) 27.0 - 31.0 pg    MCHC 29.7 (L) 33.0 - 36.0 g/dL    RDW 16.0 11.5 - 17.0 %    Platelet 154 130 - 400 x10(3)/mcL    MPV 11.2 (H) 7.4 - 10.4 fL    Neut % 82.2 %    Lymph % 5.4 %    Mono % 11.1 %    Eos % 0.7 %    Basophil % 0.2 %    Lymph # 0.61 0.6 - 4.6 x10(3)/mcL    Neut # 9.20 2.1 - 9.2 x10(3)/mcL    Mono # 1.24 0.1 - 1.3 x10(3)/mcL    Eos # 0.08 0 - 0.9 x10(3)/mcL    Baso # 0.02 <=0.2 x10(3)/mcL    IG# 0.05 (H) 0 - 0.04 x10(3)/mcL    IG% 0.4 %   POCT glucose    Collection Time: 11/05/23  6:15 AM   Result Value Ref Range    POCT Glucose 47 (LL) 70 - 110 mg/dL   POCT glucose    Collection Time: 11/05/23 10:30 AM   Result Value Ref Range    POCT Glucose 42 (LL) 70 - 110 mg/dL       ASSESSMENT & PLAN:     Current  Problems List:  Active Hospital Problems    Diagnosis  POA    Acute cystitis without hematuria [N30.00]  Yes    DORCAS (acute kidney injury) [N17.9]  Yes    Encephalopathy, metabolic [G93.41]  Yes      Resolved Hospital Problems   No resolved problems to display.       Problem Assessment & Treatment Plan:    Continue home meds  Hypoglycemic this am, hold glimeperide and metformin due to DORCAS  On Levaquin to epirically tx UTI  Pt is allergic to cephalosporins/pcn and sulfa, will tx with levaquin  Continue indwelling pearce  Iv fluids, recheck labs this evening, replace electrolytes.  Hypoglycemia--glucose on chemistry was 44, I asked nurses to recheck during my rounds it is 38, she is asymptomatic, suspect this is 2/2 dorcas and sulfonylurea, place on d5 1/2 ns and check cbg q hr until > 100 for 4 hrs.     Signing Physician:  Cindy Garduno MD

## 2023-11-06 PROBLEM — E16.2 HYPOGLYCEMIA: Status: ACTIVE | Noted: 2023-11-06

## 2023-11-06 LAB
ACINETOBACTER CALCOACETICUS-BAUMANNII COMPLEX (OHS): NOT DETECTED
ALBUMIN SERPL-MCNC: 2.4 G/DL (ref 3.4–4.8)
ALBUMIN SERPL-MCNC: 2.5 G/DL (ref 3.4–4.8)
ALBUMIN/GLOB SERPL: 0.7 RATIO (ref 1.1–2)
ALBUMIN/GLOB SERPL: 0.7 RATIO (ref 1.1–2)
ALP SERPL-CCNC: 118 UNIT/L (ref 40–150)
ALP SERPL-CCNC: 120 UNIT/L (ref 40–150)
ALT SERPL-CCNC: 40 UNIT/L (ref 0–55)
ALT SERPL-CCNC: 56 UNIT/L (ref 0–55)
AST SERPL-CCNC: 118 UNIT/L (ref 5–34)
AST SERPL-CCNC: 90 UNIT/L (ref 5–34)
BACTEROIDES FRAGILIS (OHS): NOT DETECTED
BASOPHILS # BLD AUTO: 0.02 X10(3)/MCL
BASOPHILS # BLD AUTO: 0.02 X10(3)/MCL
BASOPHILS NFR BLD AUTO: 0.3 %
BASOPHILS NFR BLD AUTO: 0.3 %
BILIRUB SERPL-MCNC: 1.2 MG/DL
BILIRUB SERPL-MCNC: 1.2 MG/DL
BUN SERPL-MCNC: 34 MG/DL (ref 9.8–20.1)
BUN SERPL-MCNC: 35 MG/DL (ref 9.8–20.1)
C AURIS DNA BLD POS QL NAA+NON-PROBE: NOT DETECTED
C GATTII+NEOFOR DNA CSF QL NAA+NON-PROBE: NOT DETECTED
CALCIUM SERPL-MCNC: 8.7 MG/DL (ref 8.4–10.2)
CALCIUM SERPL-MCNC: 8.8 MG/DL (ref 8.4–10.2)
CANDIDA ALBICANS (OHS): NOT DETECTED
CANDIDA GLABRATA (OHS): NOT DETECTED
CANDIDA KRUSEI (OHS): NOT DETECTED
CANDIDA PARAPSILOSIS (OHS): NOT DETECTED
CANDIDA TROPICALIS (OHS): NOT DETECTED
CHLORIDE SERPL-SCNC: 95 MMOL/L (ref 98–107)
CHLORIDE SERPL-SCNC: 96 MMOL/L (ref 98–107)
CO2 SERPL-SCNC: 25 MMOL/L (ref 23–31)
CO2 SERPL-SCNC: 26 MMOL/L (ref 23–31)
CREAT SERPL-MCNC: 1.87 MG/DL (ref 0.55–1.02)
CREAT SERPL-MCNC: 2.06 MG/DL (ref 0.55–1.02)
CTX-M (OHS): ABNORMAL
ENTEROBACTER CLOACAE COMPLEX (OHS): NOT DETECTED
ENTEROBACTERALES (OHS): NOT DETECTED
ENTEROCOCCUS FAECALIS (OHS): NOT DETECTED
ENTEROCOCCUS FAECIUM (OHS): NOT DETECTED
EOSINOPHIL # BLD AUTO: 0.14 X10(3)/MCL (ref 0–0.9)
EOSINOPHIL # BLD AUTO: 0.16 X10(3)/MCL (ref 0–0.9)
EOSINOPHIL NFR BLD AUTO: 1.8 %
EOSINOPHIL NFR BLD AUTO: 2.6 %
ERYTHROCYTE [DISTWIDTH] IN BLOOD BY AUTOMATED COUNT: 15.9 % (ref 11.5–17)
ERYTHROCYTE [DISTWIDTH] IN BLOOD BY AUTOMATED COUNT: 15.9 % (ref 11.5–17)
ESCHERICHIA COLI (OHS): NOT DETECTED
GFR SERPLBLD CREATININE-BSD FMLA CKD-EPI: 25 MLS/MIN/1.73/M2
GFR SERPLBLD CREATININE-BSD FMLA CKD-EPI: 28 MLS/MIN/1.73/M2
GLOBULIN SER-MCNC: 3.6 GM/DL (ref 2.4–3.5)
GLOBULIN SER-MCNC: 3.7 GM/DL (ref 2.4–3.5)
GLUCOSE SERPL-MCNC: 168 MG/DL (ref 82–115)
GLUCOSE SERPL-MCNC: 80 MG/DL (ref 82–115)
GP B STREP DNA CSF QL NAA+NON-PROBE: NOT DETECTED
HAEM INFLU DNA CSF QL NAA+NON-PROBE: NOT DETECTED
HCT VFR BLD AUTO: 32.6 % (ref 37–47)
HCT VFR BLD AUTO: 32.7 % (ref 37–47)
HGB BLD-MCNC: 10 G/DL (ref 12–16)
HGB BLD-MCNC: 9.9 G/DL (ref 12–16)
IMM GRANULOCYTES # BLD AUTO: 0.03 X10(3)/MCL (ref 0–0.04)
IMM GRANULOCYTES # BLD AUTO: 0.04 X10(3)/MCL (ref 0–0.04)
IMM GRANULOCYTES NFR BLD AUTO: 0.5 %
IMM GRANULOCYTES NFR BLD AUTO: 0.5 %
IMP (OHS): ABNORMAL
KLEBSIELLA AEROGENES (OHS): NOT DETECTED
KLEBSIELLA OXYTOCA (OHS): NOT DETECTED
KLEBSIELLA PNEUMONIAE GROUP (OHS): NOT DETECTED
KPC (OHS): ABNORMAL
L MONOCYTOG DNA CSF QL NAA+NON-PROBE: NOT DETECTED
LYMPHOCYTES # BLD AUTO: 0.51 X10(3)/MCL (ref 0.6–4.6)
LYMPHOCYTES # BLD AUTO: 0.57 X10(3)/MCL (ref 0.6–4.6)
LYMPHOCYTES NFR BLD AUTO: 7.4 %
LYMPHOCYTES NFR BLD AUTO: 8.4 %
MAGNESIUM SERPL-MCNC: 2 MG/DL (ref 1.6–2.6)
MCH RBC QN AUTO: 26.3 PG (ref 27–31)
MCH RBC QN AUTO: 26.7 PG (ref 27–31)
MCHC RBC AUTO-ENTMCNC: 30.3 G/DL (ref 33–36)
MCHC RBC AUTO-ENTMCNC: 30.7 G/DL (ref 33–36)
MCR-1 (OHS): ABNORMAL
MCV RBC AUTO: 86.9 FL (ref 80–94)
MCV RBC AUTO: 87 FL (ref 80–94)
MECA/C (OHS): ABNORMAL
MECA/C AND MREJ (MRSA)(OHS): ABNORMAL
MONOCYTES # BLD AUTO: 0.67 X10(3)/MCL (ref 0.1–1.3)
MONOCYTES # BLD AUTO: 0.89 X10(3)/MCL (ref 0.1–1.3)
MONOCYTES NFR BLD AUTO: 11 %
MONOCYTES NFR BLD AUTO: 11.6 %
N MEN DNA CSF QL NAA+NON-PROBE: NOT DETECTED
NDM (OHS): ABNORMAL
NEUTROPHILS # BLD AUTO: 4.7 X10(3)/MCL (ref 2.1–9.2)
NEUTROPHILS # BLD AUTO: 6 X10(3)/MCL (ref 2.1–9.2)
NEUTROPHILS NFR BLD AUTO: 77.2 %
NEUTROPHILS NFR BLD AUTO: 78.4 %
OXA-48-LIKE (OHS): ABNORMAL
PHOSPHATE SERPL-MCNC: 3.1 MG/DL (ref 2.3–4.7)
PLATELET # BLD AUTO: 129 X10(3)/MCL (ref 130–400)
PLATELET # BLD AUTO: 142 X10(3)/MCL (ref 130–400)
PMV BLD AUTO: 10.9 FL (ref 7.4–10.4)
PMV BLD AUTO: 11 FL (ref 7.4–10.4)
POCT GLUCOSE: 106 MG/DL (ref 70–110)
POCT GLUCOSE: 125 MG/DL (ref 70–110)
POCT GLUCOSE: 127 MG/DL (ref 70–110)
POCT GLUCOSE: 128 MG/DL (ref 70–110)
POCT GLUCOSE: 153 MG/DL (ref 70–110)
POCT GLUCOSE: 39 MG/DL (ref 70–110)
POCT GLUCOSE: 53 MG/DL (ref 70–110)
POCT GLUCOSE: 69 MG/DL (ref 70–110)
POCT GLUCOSE: 81 MG/DL (ref 70–110)
POCT GLUCOSE: 84 MG/DL (ref 70–110)
POCT GLUCOSE: 86 MG/DL (ref 70–110)
POCT GLUCOSE: 97 MG/DL (ref 70–110)
POCT GLUCOSE: 99 MG/DL (ref 70–110)
POTASSIUM SERPL-SCNC: 3.6 MMOL/L (ref 3.5–5.1)
POTASSIUM SERPL-SCNC: 3.9 MMOL/L (ref 3.5–5.1)
PROT SERPL-MCNC: 6 GM/DL (ref 5.8–7.6)
PROT SERPL-MCNC: 6.2 GM/DL (ref 5.8–7.6)
PROTEUS SPP. (OHS): NOT DETECTED
PSEUDOMONAS AERUGINOSA (OHS): NOT DETECTED
RBC # BLD AUTO: 3.75 X10(6)/MCL (ref 4.2–5.4)
RBC # BLD AUTO: 3.76 X10(6)/MCL (ref 4.2–5.4)
S ENT+BONG DNA STL QL NAA+NON-PROBE: NOT DETECTED
S PNEUM DNA CSF QL NAA+NON-PROBE: NOT DETECTED
SERRATIA MARCESCENS (OHS): NOT DETECTED
SODIUM SERPL-SCNC: 132 MMOL/L (ref 136–145)
SODIUM SERPL-SCNC: 133 MMOL/L (ref 136–145)
STAPHYLOCOCCUS AUREUS (OHS): NOT DETECTED
STAPHYLOCOCCUS EPIDERMIDIS (OHS): NOT DETECTED
STAPHYLOCOCCUS LUGDUNENSIS (OHS): NOT DETECTED
STAPHYLOCOCCUS SPP. (OHS): DETECTED
STENOTROPHOMONAS MALTOPHILIA (OHS): NOT DETECTED
STREPTOCOCCUS PYOGENES (GROUP A)(OHS): NOT DETECTED
STREPTOCOCCUS SPP. (OHS): NOT DETECTED
VANA/B (OHS): ABNORMAL
VIM (OHS): ABNORMAL
WBC # SPEC AUTO: 6.09 X10(3)/MCL (ref 4.5–11.5)
WBC # SPEC AUTO: 7.66 X10(3)/MCL (ref 4.5–11.5)

## 2023-11-06 PROCEDURE — 25000003 PHARM REV CODE 250: Performed by: INTERNAL MEDICINE

## 2023-11-06 PROCEDURE — 25000003 PHARM REV CODE 250: Performed by: FAMILY MEDICINE

## 2023-11-06 PROCEDURE — 80053 COMPREHEN METABOLIC PANEL: CPT | Performed by: FAMILY MEDICINE

## 2023-11-06 PROCEDURE — A4216 STERILE WATER/SALINE, 10 ML: HCPCS | Performed by: FAMILY MEDICINE

## 2023-11-06 PROCEDURE — 94761 N-INVAS EAR/PLS OXIMETRY MLT: CPT

## 2023-11-06 PROCEDURE — 36410 VNPNXR 3YR/> PHY/QHP DX/THER: CPT

## 2023-11-06 PROCEDURE — 85025 COMPLETE CBC W/AUTO DIFF WBC: CPT | Performed by: FAMILY MEDICINE

## 2023-11-06 PROCEDURE — 85025 COMPLETE CBC W/AUTO DIFF WBC: CPT | Performed by: INTERNAL MEDICINE

## 2023-11-06 PROCEDURE — 99900035 HC TECH TIME PER 15 MIN (STAT)

## 2023-11-06 PROCEDURE — 83735 ASSAY OF MAGNESIUM: CPT | Performed by: FAMILY MEDICINE

## 2023-11-06 PROCEDURE — 20000000 HC ICU ROOM

## 2023-11-06 PROCEDURE — 80053 COMPREHEN METABOLIC PANEL: CPT | Performed by: INTERNAL MEDICINE

## 2023-11-06 PROCEDURE — 97530 THERAPEUTIC ACTIVITIES: CPT

## 2023-11-06 PROCEDURE — 63600175 PHARM REV CODE 636 W HCPCS: Performed by: FAMILY MEDICINE

## 2023-11-06 PROCEDURE — 25000242 PHARM REV CODE 250 ALT 637 W/ HCPCS: Performed by: INTERNAL MEDICINE

## 2023-11-06 PROCEDURE — 84100 ASSAY OF PHOSPHORUS: CPT | Performed by: FAMILY MEDICINE

## 2023-11-06 PROCEDURE — 97161 PT EVAL LOW COMPLEX 20 MIN: CPT

## 2023-11-06 PROCEDURE — C1751 CATH, INF, PER/CENT/MIDLINE: HCPCS

## 2023-11-06 PROCEDURE — 63600175 PHARM REV CODE 636 W HCPCS: Performed by: INTERNAL MEDICINE

## 2023-11-06 PROCEDURE — 76937 US GUIDE VASCULAR ACCESS: CPT

## 2023-11-06 RX ORDER — FLUTICASONE PROPIONATE 50 MCG
1 SPRAY, SUSPENSION (ML) NASAL 2 TIMES DAILY
Status: DISCONTINUED | OUTPATIENT
Start: 2023-11-06 | End: 2023-11-15 | Stop reason: HOSPADM

## 2023-11-06 RX ORDER — BENZONATATE 100 MG/1
200 CAPSULE ORAL 3 TIMES DAILY PRN
Status: DISCONTINUED | OUTPATIENT
Start: 2023-11-06 | End: 2023-11-15 | Stop reason: HOSPADM

## 2023-11-06 RX ORDER — IBUPROFEN 200 MG
16 TABLET ORAL
Status: DISCONTINUED | OUTPATIENT
Start: 2023-11-06 | End: 2023-11-15 | Stop reason: HOSPADM

## 2023-11-06 RX ORDER — LEVOFLOXACIN 750 MG/1
750 TABLET ORAL EVERY OTHER DAY
Status: DISCONTINUED | OUTPATIENT
Start: 2023-11-08 | End: 2023-11-08

## 2023-11-06 RX ORDER — IBUPROFEN 200 MG
24 TABLET ORAL
Status: DISCONTINUED | OUTPATIENT
Start: 2023-11-06 | End: 2023-11-15 | Stop reason: HOSPADM

## 2023-11-06 RX ORDER — HYDROCODONE BITARTRATE AND ACETAMINOPHEN 5; 325 MG/1; MG/1
1 TABLET ORAL EVERY 6 HOURS PRN
Status: DISCONTINUED | OUTPATIENT
Start: 2023-11-06 | End: 2023-11-15 | Stop reason: HOSPADM

## 2023-11-06 RX ADMIN — EZETIMIBE 10 MG: 10 TABLET ORAL at 09:11

## 2023-11-06 RX ADMIN — DEXTROSE MONOHYDRATE 250 ML: 100 INJECTION, SOLUTION INTRAVENOUS at 01:11

## 2023-11-06 RX ADMIN — SODIUM CHLORIDE, PRESERVATIVE FREE 10 ML: 5 INJECTION INTRAVENOUS at 05:11

## 2023-11-06 RX ADMIN — VASOPRESSIN: 20 INJECTION, SOLUTION INTRAVENOUS at 08:11

## 2023-11-06 RX ADMIN — PANTOPRAZOLE SODIUM 40 MG: 40 TABLET, DELAYED RELEASE ORAL at 09:11

## 2023-11-06 RX ADMIN — CHOLECALCIFEROL TAB 25 MCG (1000 UNIT) 2000 UNITS: 25 TAB at 09:11

## 2023-11-06 RX ADMIN — BENZONATATE 200 MG: 100 CAPSULE ORAL at 11:11

## 2023-11-06 RX ADMIN — MUPIROCIN 1 G: 20 OINTMENT TOPICAL at 09:11

## 2023-11-06 RX ADMIN — Medication 24 G: at 01:11

## 2023-11-06 RX ADMIN — METOPROLOL SUCCINATE 25 MG: 25 TABLET, EXTENDED RELEASE ORAL at 09:11

## 2023-11-06 RX ADMIN — VANCOMYCIN HYDROCHLORIDE 1500 MG: 1.5 INJECTION, POWDER, LYOPHILIZED, FOR SOLUTION INTRAVENOUS at 09:11

## 2023-11-06 RX ADMIN — LEVOTHYROXINE SODIUM 50 MCG: 50 TABLET ORAL at 05:11

## 2023-11-06 RX ADMIN — HYDROCODONE BITARTRATE AND ACETAMINOPHEN 1 TABLET: 5; 325 TABLET ORAL at 12:11

## 2023-11-06 RX ADMIN — VASOPRESSIN: 20 INJECTION, SOLUTION INTRAVENOUS at 07:11

## 2023-11-06 RX ADMIN — HYDROCODONE BITARTRATE AND ACETAMINOPHEN 1 TABLET: 5; 325 TABLET ORAL at 11:11

## 2023-11-06 RX ADMIN — DEXTROSE MONOHYDRATE: 100 INJECTION, SOLUTION INTRAVENOUS at 06:11

## 2023-11-06 RX ADMIN — APIXABAN 2.5 MG: 2.5 TABLET, FILM COATED ORAL at 09:11

## 2023-11-06 RX ADMIN — LEVOFLOXACIN 750 MG: 750 INJECTION, SOLUTION INTRAVENOUS at 10:11

## 2023-11-06 RX ADMIN — FLUTICASONE PROPIONATE 50 MCG: 50 SPRAY, METERED NASAL at 09:11

## 2023-11-06 RX ADMIN — HYDROCODONE BITARTRATE AND ACETAMINOPHEN 1 TABLET: 5; 325 TABLET ORAL at 04:11

## 2023-11-06 RX ADMIN — VENLAFAXINE HYDROCHLORIDE 75 MG: 37.5 CAPSULE, EXTENDED RELEASE ORAL at 09:11

## 2023-11-06 RX ADMIN — SODIUM CHLORIDE, PRESERVATIVE FREE 10 ML: 5 INJECTION INTRAVENOUS at 06:11

## 2023-11-06 NOTE — PT/OT/SLP EVAL
Physical Therapy Evaluation    Patient Name:  Kaila Hammond   MRN:  [id number]    Recommendations:     Discharge Recommendations:     Discharge Equipment Recommendations: walker, rolling   Barriers to discharge: None    Assessment:     Kaila Hammond is a 73 y.o. female admitted with a medical diagnosis of Acute cystitis without hematuria.  She presents with the following impairments/functional limitations: weakness, impaired endurance, impaired self care skills, impaired functional mobility, gait instability .    Rehab Prognosis: Good; patient would benefit from acute skilled PT services to address these deficits and reach maximum level of function.    Recent Surgery: * No surgery found *      Plan:     During this hospitalization, patient to be seen 5 x/week to address the identified rehab impairments via gait training, therapeutic activities, therapeutic exercises and progress toward the following goals:    Plan of Care Expires:       Subjective     Chief Complaint: no c/o  Patient/Family Comments/goals: rtn hm  Pain/Comfort:       Patients cultural, spiritual, Tenriism conflicts given the current situation:      Living Environment:  Pt lives home alone no steps to enter  Prior to admission, patients level of function was I amb .  Equipment used at home: none.  DME owned (not currently used): none.  Upon discharge, patient will have assistance from family intermittently.    Objective:     Communicated with pt and nurse prior to session.  Patient found HOB elevated with    upon PT entry to room.    General Precautions: Standard,    Orthopedic Precautions:    Braces:    Respiratory Status: Room air    Exams:  RLE Strength: WFL  LLE Strength: WFL    Functional Mobility:  Bed Mobility:     Scooting: moderate assistance  Supine to Sit: moderate assistance  Transfers:     Sit to Stand:  minimum assistance with rolling walker  Gait: amb to chair with RW Keiko      AM-PAC 6 CLICK MOBILITY  Total  Score:11       Treatment & Education:  Tf to chair Keiko.  Intro HEP BLE AROM ex.     Patient left up in chair with all lines intact and call button in reach.    GOALS:   Multidisciplinary Problems       Physical Therapy Goals          Problem: Physical Therapy    Goal Priority Disciplines Outcome Goal Variances Interventions   Physical Therapy Goal     PT, PT/OT Ongoing, Progressing     Description: 1.  Pt will improve bed mob to SBA  2.  Pt will improve tf to chair SBA  3.  Pt will amb 50ft RW SBA  4.  Pt will be I HEP to maintain strength while inpt                       History:     Past Medical History:   Diagnosis Date    A-fib     Chronic kidney disease     CNS lymphoma     Depression     DM (diabetes mellitus)     Essential (primary) hypertension     Mixed hyperlipidemia     Obesity     Peripheral vascular disease     Personal history of colonic polyps     Polyneuropathy     Sleep apnea        Past Surgical History:   Procedure Laterality Date    ANTERIOR LUMBAR INTERBODY FUSION (ALIF)      APPENDECTOMY      BRAIN TUMOR EXCISION      CARPAL TUNNEL RELEASE Bilateral     CATARACT EXTRACTION Bilateral     CHOLECYSTECTOMY      CORONARY ARTERY BYPASS GRAFT      KNEE ARTHROSCOPY Left     LAMINECTOMY      MINIMALLY INVASIVE TRANSFORAMINAL LUMBAR INTERBODY FUSION (TLIF)      TOTAL ABDOMINAL HYSTERECTOMY W/ BILATERAL SALPINGOOPHORECTOMY      TYMPANOPLASTY WITH MASTOIDECTOMY      and removal of cholesteotoma       Time Tracking:     PT Received On: 11/06/23  PT Start Time: 0800     PT Stop Time: 0830  PT Total Time (min): 30 min     Billable Minutes: Evaluation 15 and Therapeutic Activity 15      11/06/2023

## 2023-11-06 NOTE — PROGRESS NOTES
Pharmacist Intervention IV to PO Note    Kaila Hammond is a 73 y.o. female being treated with IV medication levofloxacin    Patient Data:    Vital Signs (Most Recent):  Temp: 97.9 °F (36.6 °C) (11/06/23 0800)  Pulse: 70 (11/06/23 0955)  Resp: 16 (11/06/23 0800)  BP: 117/73 (11/06/23 0955)  SpO2: 96 % (11/06/23 0800) Vital Signs (72h Range):  Temp:  [96.4 °F (35.8 °C)-103.1 °F (39.5 °C)]   Pulse:  []   Resp:  [14-21]   BP: ()/(53-95)   SpO2:  [90 %-100 %]      CBC:  Recent Labs   Lab 11/04/23  1706 11/05/23 0313 11/06/23  0411   WBC 12.27* 11.20 7.66   RBC 4.24 3.89* 3.75*   HGB 11.2* 10.2* 10.0*   HCT 36.5* 34.4* 32.6*    154 142   MCV 86.1 88.4 86.9   MCH 26.4* 26.2* 26.7*   MCHC 30.7* 29.7* 30.7*     CMP:     Recent Labs   Lab 11/05/23 0313 11/05/23  1601 11/06/23  0411   CALCIUM 8.7 8.5 8.8   ALBUMIN 2.6* 2.5* 2.5*   * 132* 133*   K 3.0* 3.4* 3.6   CO2 29 27 26   BUN 46.0* 40.0* 35.0*   CREATININE 2.60* 2.14* 2.06*   ALKPHOS 108 114 118   ALT 28 31 40   AST 62* 70* 90*   BILITOT 1.3 1.1 1.2       Dietary Orders:  Diet Orders            Diet diabetic: Diabetic starting at 11/04 1904            Based on the following criteria, this patient qualifies for intravenous to oral conversion:  [x] The patients gastrointestinal tract is functioning (tolerating medications via oral or enteral route for 24 hours and tolerating food or enteral feeds for 24 hours).  [IV medication levofloxacin will be changed to oral medication levofloxacin    Pharmacist's Name: Ed Diez  Pharmacist's Extension: [phone]

## 2023-11-06 NOTE — PROCEDURES
"Kaila Hammond is a 73 y.o. female patient.    Temp: 97.9 °F (36.6 °C) (11/05/23 2305)  Pulse: 86 (11/05/23 2305)  Resp: 17 (11/05/23 2305)  BP: 132/72 (11/05/23 2305)  SpO2: 98 % (11/05/23 2305)  Weight: 102.3 kg (225 lb 8.5 oz) (11/04/23 2119)  Height: 5' 7" (170.2 cm) (11/04/23 2119)    PICC  Date/Time: 11/6/2023 1:41 AM  Performed by: Aaron Delacruz RN  Consent Done: Yes  Time out: Immediately prior to procedure a time out was called to verify the correct patient, procedure, equipment, support staff and site/side marked as required  Indications: med administration and vascular access  Preparation: skin prepped with ChloraPrep  Skin prep agent dried: skin prep agent completely dried prior to procedure  Sterile barriers: all five maximum sterile barriers used - cap, mask, sterile gown, sterile gloves, and large sterile sheet  Hand hygiene: hand hygiene performed prior to central venous catheter insertion  Location details: right brachial  Catheter type: single lumen  Catheter size: 4 Fr  Catheter Length: 15cm    Ultrasound guidance: yes  Vessel Caliber: medium, compressibility normal  Needle advanced into vessel with real time Ultrasound guidance.  Guidewire confirmed in vessel.  Sterile sheath used.  Number of attempts: 1  Post-procedure: blood return through all ports    Assessment: free fluid flow          Name Aaron Delacruz RN  11/6/2023    "

## 2023-11-06 NOTE — PHYSICIAN QUERY
PT Name: Kaila Hammond  MR #: [id number]  DOCUMENTATION CLARIFICATION   Arianne Chow RN, CCDS    [email or web address]    Documentation Excellence    This form is a permanent document in the medical record.     Query Date: November 6, 2023    By submitting this query, we are merely seeking further clarification of documentation. Please utilize your independent clinical judgment when addressing the question(s) below.    The Medical Record contains the following:   Indicators Supporting Clinical Findings Location in Medical Record   x CKD or Chronic Kidney (Renal) Failure/Disease PMHX: CKD ED MD   x BUN/Creatinine                          GFR  11/04/23 17:06 11/05/23 03:13 11/05/23 16:01 11/06/23 04:11   BUN 41.0 (H) 46.0 (H) 40.0 (H) 35.0 (H)   Creatinine 2.34 (H) 2.60 (H) 2.14 (H) 2.06 (H)   eGFR 22 19 24 25    lab   x Dehydration hyponatremia Ed md    Nausea / Vomiting      Dialysis / CRRT      Medication     x Treatment Continue indwelling pearce  Iv fluids, recheck labs this evening, replace electrolytes. H&P       x Other Chronic Conditions PMHX:  HTN   Cabg 2 yrs ago  CAD EF 40-45%    CNS Lymphoma   PVD    Hx:  DM   sleep apnea Ed md    H&P      Other  Acute cystitis w/o hematuria     DORCAS     Metabolic Encephalopathy          Hypoglycemic   H&P       Provider, please  specify the stage of Chronic Kidney Disease (CKD):    [   ] Chronic Kidney Disease (CKD) stage 4 - Severely decreased eGFR 15-29     [   ] Other (please specify): ______________     [ x  ]   Clinically Undetermined     Please document in your progress notes daily for the duration of treatment until resolved and include in your discharge summary.    Reference:   BENIGNO Rodney MD, & SHAHEEN Bethea MD, MS. (2020, June 18). Definition and staging of chronic kidney disease in adults ([id number] MILTON Chao MD, ScD & [id number] PARISH Whittington MD, MSc, Eds.). Retrieved October 21, 2020, from  [email or web address]-and-staging-[email or web address]-chronic-kidney-disease-in-adults?search=ckd%20staging&source=search_result&selectedTitle=1~150&usage_type=default&display_rank=1  Form No. 64211

## 2023-11-06 NOTE — PLAN OF CARE
Problem: Infection  Goal: Absence of Infection Signs and Symptoms  Outcome: Ongoing, Progressing     Problem: Adult Inpatient Plan of Care  Goal: Plan of Care Review  Outcome: Ongoing, Progressing  Goal: Patient-Specific Goal (Individualized)  Outcome: Ongoing, Progressing  Goal: Absence of Hospital-Acquired Illness or Injury  Outcome: Ongoing, Progressing  Goal: Optimal Comfort and Wellbeing  Outcome: Ongoing, Progressing  Goal: Readiness for Transition of Care  Outcome: Ongoing, Progressing     Problem: Diabetes Comorbidity  Goal: Blood Glucose Level Within Targeted Range  Outcome: Ongoing, Progressing     Problem: Hypertension Comorbidity  Goal: Blood Pressure in Desired Range  Outcome: Ongoing, Progressing     Problem: UTI (Urinary Tract Infection)  Goal: Improved Infection Symptoms  Outcome: Ongoing, Progressing     Problem: Fluid and Electrolyte Imbalance (Acute Kidney Injury/Impairment)  Goal: Fluid and Electrolyte Balance  Outcome: Ongoing, Progressing     Problem: Oral Intake Inadequate (Acute Kidney Injury/Impairment)  Goal: Optimal Nutrition Intake  Outcome: Ongoing, Progressing     Problem: Renal Function Impairment (Acute Kidney Injury/Impairment)  Goal: Effective Renal Function  Outcome: Ongoing, Progressing     Problem: Fall Injury Risk  Goal: Absence of Fall and Fall-Related Injury  Outcome: Ongoing, Progressing     Problem: Dysrhythmia  Goal: Normalized Cardiac Rhythm  Outcome: Ongoing, Progressing

## 2023-11-06 NOTE — PROGRESS NOTES
"Inpatient Nutrition Evaluation    Admit Date: 11/4/2023   Total duration of encounter: 2 days    Nutrition Recommendation/Prescription     Continue Diabetic Diet as tolerated.   Monitor intake, weight, and labs.     RD following and available as needed. Thank you.     Nutrition Assessment     Chart Review    Reason Seen: continuous nutrition monitoring    Malnutrition Screening Tool Results   Have you recently lost weight without trying?: No  Have you been eating poorly because of a decreased appetite?: No   MST Score: 0     Diagnosis:  Acute Cystitis without hematuria.     Relevant Medical History:   A-fib      Chronic kidney disease      CNS lymphoma      Depression      DM (diabetes mellitus)      Essential (primary) hypertension      Mixed hyperlipidemia      Obesity      Peripheral vascular disease      Personal history of colonic polyps      Polyneuropathy      Sleep apnea          Nutrition-Related Medications:   IV Fluids: D10% in water with sodium chloride hypertonic @ 100 mL/hr.   Vitamin D; Levothyroxine.       Nutrition-Related Labs:  11/6: H/H 10.0/32.6(L); Na+ 133(L); BUN/Crea 35.0/2.06(H);GFR 25(L); GLU 80(L); Alb 2.5(L); AST 90(H).    Diet Order: Diet diabetic  Oral Supplement Order: none  Appetite/Oral Intake: good/% of meals  Factors Affecting Nutritional Intake: none identified  Food/Judaism/Cultural Preferences: none reported  Food Allergies: none reported       Wound(s):       Comments    11/6: Noted low GLU. Nursing reports pt with good appetite and intake. No issues with meals and no need for ONS at this time per nursing. No recent weight loss noted/reported. Will continue to monitor during stay.     Anthropometrics    Height: 5' 7" (170.2 cm) Height Method: Stated  Last Weight: 102.3 kg (225 lb 8.5 oz) (11/04/23 2119) Weight Method: Bed Scale  BMI (Calculated): 35.3  BMI Classification: obese grade II (BMI 35-39.9)     Ideal Body Weight (IBW), Female: 135 lb     % Ideal Body Weight, " Female (lb): 167.06 %                             Usual Weight Provided By: EMR weight history    Wt Readings from Last 5 Encounters:   11/04/23 102.3 kg (225 lb 8.5 oz)   05/24/23 104.3 kg (230 lb)   05/17/23 104.3 kg (230 lb)   04/12/23 104.3 kg (230 lb)   04/05/23 104.3 kg (230 lb)     Weight Change(s) Since Admission:  Admit Weight: 99.8 kg (220 lb) (11/04/23 1657)      Patient Education    Not applicable.    Monitoring & Evaluation     Dietitian will monitor food and beverage intake, energy intake, weight, weight change, electrolyte/renal panel, glucose/endocrine profile, and gastrointestinal profile.  Nutrition Risk/Follow-Up: low (follow-up in 5-7 days)  Patients assigned 'low nutrition risk' status do not qualify for a full nutritional assessment but will be monitored and re-evaluated in a 5-7 day time period. Please consult if re-evaluation needed sooner.

## 2023-11-06 NOTE — PLAN OF CARE
Problem: Infection  Goal: Absence of Infection Signs and Symptoms  Outcome: Ongoing, Progressing     Problem: Adult Inpatient Plan of Care  Goal: Plan of Care Review  Outcome: Ongoing, Progressing  Goal: Patient-Specific Goal (Individualized)  Outcome: Ongoing, Progressing  Goal: Absence of Hospital-Acquired Illness or Injury  Outcome: Ongoing, Progressing  Goal: Optimal Comfort and Wellbeing  Outcome: Ongoing, Progressing  Goal: Readiness for Transition of Care  Outcome: Ongoing, Progressing     Problem: Diabetes Comorbidity  Goal: Blood Glucose Level Within Targeted Range  Outcome: Ongoing, Progressing     Problem: Hypertension Comorbidity  Goal: Blood Pressure in Desired Range  Outcome: Ongoing, Progressing     Problem: UTI (Urinary Tract Infection)  Goal: Improved Infection Symptoms  Outcome: Ongoing, Progressing     Problem: Fluid and Electrolyte Imbalance (Acute Kidney Injury/Impairment)  Goal: Fluid and Electrolyte Balance  Outcome: Ongoing, Progressing     Problem: Oral Intake Inadequate (Acute Kidney Injury/Impairment)  Goal: Optimal Nutrition Intake  Outcome: Ongoing, Progressing     Problem: Renal Function Impairment (Acute Kidney Injury/Impairment)  Goal: Effective Renal Function  Outcome: Ongoing, Progressing

## 2023-11-07 PROBLEM — Z16.12 URINARY TRACT INFECTION DUE TO EXTENDED-SPECTRUM BETA LACTAMASE (ESBL) PRODUCING ESCHERICHIA COLI: Status: ACTIVE | Noted: 2023-11-07

## 2023-11-07 PROBLEM — B96.29 URINARY TRACT INFECTION DUE TO EXTENDED-SPECTRUM BETA LACTAMASE (ESBL) PRODUCING ESCHERICHIA COLI: Status: ACTIVE | Noted: 2023-11-07

## 2023-11-07 PROBLEM — N39.0 URINARY TRACT INFECTION DUE TO EXTENDED-SPECTRUM BETA LACTAMASE (ESBL) PRODUCING ESCHERICHIA COLI: Status: ACTIVE | Noted: 2023-11-07

## 2023-11-07 LAB
ALBUMIN SERPL-MCNC: 2.4 G/DL (ref 3.4–4.8)
ALBUMIN/GLOB SERPL: 0.6 RATIO (ref 1.1–2)
ALP SERPL-CCNC: 137 UNIT/L (ref 40–150)
ALT SERPL-CCNC: 57 UNIT/L (ref 0–55)
AST SERPL-CCNC: 115 UNIT/L (ref 5–34)
BACTERIA UR CULT: ABNORMAL
BASOPHILS # BLD AUTO: 0.03 X10(3)/MCL
BASOPHILS NFR BLD AUTO: 0.5 %
BILIRUB SERPL-MCNC: 1.5 MG/DL
BUN SERPL-MCNC: 29 MG/DL (ref 9.8–20.1)
CALCIUM SERPL-MCNC: 9 MG/DL (ref 8.4–10.2)
CHLORIDE SERPL-SCNC: 97 MMOL/L (ref 98–107)
CO2 SERPL-SCNC: 25 MMOL/L (ref 23–31)
CREAT SERPL-MCNC: 1.69 MG/DL (ref 0.55–1.02)
EOSINOPHIL # BLD AUTO: 0.16 X10(3)/MCL (ref 0–0.9)
EOSINOPHIL NFR BLD AUTO: 2.7 %
ERYTHROCYTE [DISTWIDTH] IN BLOOD BY AUTOMATED COUNT: 15.9 % (ref 11.5–17)
GFR SERPLBLD CREATININE-BSD FMLA CKD-EPI: 32 MLS/MIN/1.73/M2
GLOBULIN SER-MCNC: 3.8 GM/DL (ref 2.4–3.5)
GLUCOSE SERPL-MCNC: 215 MG/DL (ref 82–115)
HCT VFR BLD AUTO: 33.4 % (ref 37–47)
HGB BLD-MCNC: 10.2 G/DL (ref 12–16)
IMM GRANULOCYTES # BLD AUTO: 0.04 X10(3)/MCL (ref 0–0.04)
IMM GRANULOCYTES NFR BLD AUTO: 0.7 %
LYMPHOCYTES # BLD AUTO: 0.46 X10(3)/MCL (ref 0.6–4.6)
LYMPHOCYTES NFR BLD AUTO: 7.7 %
MAGNESIUM SERPL-MCNC: 2 MG/DL (ref 1.6–2.6)
MCH RBC QN AUTO: 26.4 PG (ref 27–31)
MCHC RBC AUTO-ENTMCNC: 30.5 G/DL (ref 33–36)
MCV RBC AUTO: 86.3 FL (ref 80–94)
MONOCYTES # BLD AUTO: 0.67 X10(3)/MCL (ref 0.1–1.3)
MONOCYTES NFR BLD AUTO: 11.1 %
NEUTROPHILS # BLD AUTO: 4.65 X10(3)/MCL (ref 2.1–9.2)
NEUTROPHILS NFR BLD AUTO: 77.3 %
PHOSPHATE SERPL-MCNC: 2.7 MG/DL (ref 2.3–4.7)
PLATELET # BLD AUTO: 136 X10(3)/MCL (ref 130–400)
PMV BLD AUTO: 11.3 FL (ref 7.4–10.4)
POCT GLUCOSE: 177 MG/DL (ref 70–110)
POCT GLUCOSE: 205 MG/DL (ref 70–110)
POCT GLUCOSE: 210 MG/DL (ref 70–110)
POCT GLUCOSE: 213 MG/DL (ref 70–110)
POCT GLUCOSE: 227 MG/DL (ref 70–110)
POCT GLUCOSE: 228 MG/DL (ref 70–110)
POTASSIUM SERPL-SCNC: 4.1 MMOL/L (ref 3.5–5.1)
PROT SERPL-MCNC: 6.2 GM/DL (ref 5.8–7.6)
RBC # BLD AUTO: 3.87 X10(6)/MCL (ref 4.2–5.4)
SODIUM SERPL-SCNC: 131 MMOL/L (ref 136–145)
WBC # SPEC AUTO: 6.01 X10(3)/MCL (ref 4.5–11.5)

## 2023-11-07 PROCEDURE — 85025 COMPLETE CBC W/AUTO DIFF WBC: CPT | Performed by: INTERNAL MEDICINE

## 2023-11-07 PROCEDURE — 25000003 PHARM REV CODE 250: Performed by: INTERNAL MEDICINE

## 2023-11-07 PROCEDURE — 25000003 PHARM REV CODE 250: Performed by: FAMILY MEDICINE

## 2023-11-07 PROCEDURE — 80053 COMPREHEN METABOLIC PANEL: CPT | Performed by: INTERNAL MEDICINE

## 2023-11-07 PROCEDURE — 20000000 HC ICU ROOM

## 2023-11-07 PROCEDURE — 97116 GAIT TRAINING THERAPY: CPT

## 2023-11-07 PROCEDURE — 27000207 HC ISOLATION

## 2023-11-07 PROCEDURE — 25000242 PHARM REV CODE 250 ALT 637 W/ HCPCS: Performed by: INTERNAL MEDICINE

## 2023-11-07 PROCEDURE — A4216 STERILE WATER/SALINE, 10 ML: HCPCS | Performed by: FAMILY MEDICINE

## 2023-11-07 PROCEDURE — 84100 ASSAY OF PHOSPHORUS: CPT | Performed by: INTERNAL MEDICINE

## 2023-11-07 PROCEDURE — 94761 N-INVAS EAR/PLS OXIMETRY MLT: CPT

## 2023-11-07 PROCEDURE — 99900035 HC TECH TIME PER 15 MIN (STAT)

## 2023-11-07 PROCEDURE — 83735 ASSAY OF MAGNESIUM: CPT | Performed by: INTERNAL MEDICINE

## 2023-11-07 RX ADMIN — PANTOPRAZOLE SODIUM 40 MG: 40 TABLET, DELAYED RELEASE ORAL at 09:11

## 2023-11-07 RX ADMIN — VENLAFAXINE HYDROCHLORIDE 75 MG: 37.5 CAPSULE, EXTENDED RELEASE ORAL at 09:11

## 2023-11-07 RX ADMIN — SODIUM CHLORIDE, PRESERVATIVE FREE 10 ML: 5 INJECTION INTRAVENOUS at 12:11

## 2023-11-07 RX ADMIN — MUPIROCIN 1 G: 20 OINTMENT TOPICAL at 09:11

## 2023-11-07 RX ADMIN — APIXABAN 2.5 MG: 2.5 TABLET, FILM COATED ORAL at 09:11

## 2023-11-07 RX ADMIN — FLUTICASONE PROPIONATE 50 MCG: 50 SPRAY, METERED NASAL at 09:11

## 2023-11-07 RX ADMIN — CHOLECALCIFEROL TAB 25 MCG (1000 UNIT) 2000 UNITS: 25 TAB at 09:11

## 2023-11-07 RX ADMIN — SODIUM CHLORIDE, PRESERVATIVE FREE 10 ML: 5 INJECTION INTRAVENOUS at 05:11

## 2023-11-07 RX ADMIN — METOPROLOL SUCCINATE 25 MG: 25 TABLET, EXTENDED RELEASE ORAL at 09:11

## 2023-11-07 RX ADMIN — SODIUM CHLORIDE, PRESERVATIVE FREE 10 ML: 5 INJECTION INTRAVENOUS at 11:11

## 2023-11-07 RX ADMIN — EZETIMIBE 10 MG: 10 TABLET ORAL at 09:11

## 2023-11-07 RX ADMIN — BENZONATATE 200 MG: 100 CAPSULE ORAL at 09:11

## 2023-11-07 RX ADMIN — BENZONATATE 200 MG: 100 CAPSULE ORAL at 05:11

## 2023-11-07 RX ADMIN — LEVOTHYROXINE SODIUM 50 MCG: 50 TABLET ORAL at 05:11

## 2023-11-07 NOTE — NURSING
Called report to Dr. Lucas.  CBG results given, informed of complaint of cough and abd bloating. New orders received

## 2023-11-07 NOTE — PT/OT/SLP PROGRESS
Physical Therapy Treatment    Patient Name:  Kaila Hammond   MRN:  [id number]    Recommendations:     Discharge Recommendations: hm   Discharge Equipment Recommendations: walker, rolling  Barriers to discharge: None    Assessment:     Kaila Hammond is a 73 y.o. female admitted with a medical diagnosis of Acute cystitis without hematuria.  She presents with the following impairments/functional limitations: weakness, impaired endurance, impaired self care skills, impaired functional mobility, gait instability .    Rehab Prognosis: Good; patient would benefit from acute skilled PT services to address these deficits and reach maximum level of function.    Recent Surgery: * No surgery found *      Plan:     During this hospitalization, patient to be seen 5 x/week to address the identified rehab impairments via gait training, therapeutic activities, therapeutic exercises and progress toward the following goals:    Plan of Care Expires:       Subjective     Chief Complaint: Pt up on BSC ready to get up  Patient/Family Comments/goals: home  Pain/Comfort:         Objective:     Communicated with pt, nurse prior to session.  Patient found  up on BSC  with   request to get up upon PT entry to room.     General Precautions: Standard,    Orthopedic Precautions:    Braces:    Respiratory Status: Room air     Functional Mobility:  Transfers:     Sit to Stand:  minimum assistance with rolling walker  Gait: amb 20ft RW Keiko      AM-PAC 6 CLICK MOBILITY          Treatment & Education:  TF to chair Keiko    Patient left up in chair with all lines intact, call button in reach, and nurse present..    GOALS:   Multidisciplinary Problems       Physical Therapy Goals          Problem: Physical Therapy    Goal Priority Disciplines Outcome Goal Variances Interventions   Physical Therapy Goal     PT, PT/OT Ongoing, Progressing     Description: 1.  Pt will improve bed mob to SBA  2.  Pt will improve tf to chair SBA  3.  Pt will amb 50ft  OLGA SBA  4.  Pt will be I HEP to maintain strength while inpt                       Time Tracking:     PT Received On: 11/07/23  PT Start Time: 0930     PT Stop Time: 0945  PT Total Time (min): 15 min     Billable Minutes: Gait Training 10  Ther Act 5    Treatment Type: Treatment  PT/PTA: PT           11/07/2023

## 2023-11-07 NOTE — PROGRESS NOTES
[facility]  [address] [place] [address]  Phone: [phone]    ([facility]) Internal Medicine  Progress Note      PATIENT NAME: Kaila Hammond  MRN: [id number]  TODAY'S DATE: 11/06/2023  ADMIT DATE: 11/4/2023    SUBJECTIVE     PRINCIPLE PROBLEM: Acute cystitis without hematuria    INTERVAL HISTORY:    11/6/2023  On service call for Dr. Garduno admitting physician over the weekend.  I appreciate her assistance.  Patient was presumably admitted for hypoglycemia acute renal failure in the setting of likely urinary tract infection with underlying and progressive repetitive falls the last week or 2.  Patient stated she has not been checking his sugars in this time spent.    She required D10 over the last several hours in order to keep her sugars in the double digits.  She was taking glipizide as an outpatient.    She is tolerating a p.o. diet.  She is on Levaquin orally.    I was contacted by the nurse in the ICU around 530 tonight.  I spoke to the night shift nurse and initiated some vancomycin because of Staphylococcus in 1/2 blood culture bottles.    Of note patient was asymptomatic today.  No fevers no chills no rigors.  Just slightly weak.    Review of patient's allergies indicates:   Allergen Reactions    Augmentin [amoxicillin-pot clavulanate]     Cefaclor     Cephalexin     Penicillins     Sulfa (sulfonamide antibiotics)     Sulfamethoxazole-trimethoprim Hives       ROS negative except as mentioned above times 14 point review of systems    OBJECTIVE     VITAL SIGNS (Most Recent)  Temp: 97.3 °F (36.3 °C) (11/06/23 1910)  Pulse: 77 (11/06/23 2115)  Resp: 17 (11/06/23 1910)  BP: 123/69 (11/06/23 2115)  SpO2: 95 % (11/06/23 1910)    VENTILATION STATUS  Resp: 17 (11/06/23 1910)  SpO2: 95 % (11/06/23 1910)           I & O (Last 24H):  Intake/Output Summary (Last 24 hours) at 11/6/2023 2233  Last data filed at 11/6/2023 1830  Gross per 24 hour   Intake 5241.22 ml   Output 2700 ml    Net 2541.22 ml       WEIGHTS  Wt Readings from Last 3 Encounters:   11/04/23 2119 102.3 kg (225 lb 8.5 oz)   11/04/23 1657 99.8 kg (220 lb)   05/24/23 0833 104.3 kg (230 lb)   05/17/23 0849 104.3 kg (230 lb)       Physical Exam    On physical exam patient is alert and oriented x3.  She is in no apparent distress.  Cranial nerves 2-12 are intact.  She is regular rate and rhythm no rubs gallops or murmurs clear to auscultation bilaterally soft nontender nondistended abdomen no clubbing cyanosis or edema bilateral lower extremities.    SCHEDULED MEDS:   apixaban  2.5 mg Oral BID    ezetimibe  10 mg Oral QHS    fluticasone propionate  1 spray Each Nostril BID    [START ON 11/8/2023] levoFLOXacin  750 mg Oral Every other day    levothyroxine  50 mcg Oral Before breakfast    metoprolol succinate  25 mg Oral BID    mupirocin   Nasal BID    pantoprazole  40 mg Oral Daily    sodium chloride 0.9%  10 mL Intravenous Q6H    vancomycin (VANCOCIN) IV (PEDS and ADULTS)  1,500 mg Intravenous Once    venlafaxine  75 mg Oral Daily    vitamin D  2,000 Units Oral Daily       CONTINUOUS INFUSIONS:   dextrose 10 % in water (D10W) 10 % 1,000 mL with sodium chloride (23.4%) HYPERTONIC 4 mEq/mL 77 mEq infusion 50 mL/hr at 11/06/23 2007    dextrose 10 % in water (D10W) 100 mL/hr at 11/06/23 1829    dextrose 5 % and 0.9 % NaCl Stopped (11/05/23 2021)       PRN MEDS:dextrose 10%, dextrose 10%, glucose, glucose, HYDROcodone-acetaminophen, sodium chloride 0.9%, Flushing PICC/Midline Protocol **AND** sodium chloride 0.9% **AND** sodium chloride 0.9%, Pharmacy to dose Vancomycin consult **AND** vancomycin - pharmacy to dose    LABS AND DIAGNOSTICS     CBC LAST 3 DAYS  Recent Labs   Lab 11/05/23  0313 11/06/23  0411 11/06/23 2012   WBC 11.20 7.66 6.09   RBC 3.89* 3.75* 3.76*   HGB 10.2* 10.0* 9.9*   HCT 34.4* 32.6* 32.7*   MCV 88.4 86.9 87.0   MCH 26.2* 26.7* 26.3*   MCHC 29.7* 30.7* 30.3*   RDW 16.0 15.9 15.9    142 129*   MPV 11.2*  "11.0* 10.9*       COAGULATION LAST 3 DAYS  No results for input(s): "LABPT", "INR", "APTT" in the last 168 hours.    CHEMISTRY LAST 3 DAYS  Recent Labs   Lab 11/05/23  1601 11/05/23  2352 11/06/23  0411 11/06/23 2012   *  --  133* 132*   K 3.4*  --  3.6 3.9   CO2 27  --  26 25   BUN 40.0*  --  35.0* 34.0*   CREATININE 2.14*  --  2.06* 1.87*   CALCIUM 8.5  --  8.8 8.7   PH  --  7.400  --   --    MG  --   --  2.00  --    ALBUMIN 2.5*  --  2.5* 2.4*   ALKPHOS 114  --  118 120   ALT 31  --  40 56*   AST 70*  --  90* 118*   BILITOT 1.1  --  1.2 1.2       CARDIAC PROFILE LAST 3 DAYS  Recent Labs   Lab 11/04/23  1706 11/05/23  1601   * 286*       ENDOCRINE LAST 3 DAYS  No results for input(s): "TSH", "PROCAL" in the last 168 hours.    LAST ARTERIAL BLOOD GAS  ABG  Recent Labs   Lab 11/05/23 2352   PH 7.400   PO2 85   PCO2 39.4   HCO3 24.4   BE 0       LAST 7 DAYS MICROBIOLOGY   Microbiology Results (last 7 days)       Procedure Component Value Units Date/Time    Blood culture #2 **CANNOT BE ORDERED STAT** [[id number]]  (Normal) Collected: 11/04/23 1720    Order Status: Completed Specimen: Blood Updated: 11/06/23 1701     CULTURE, BLOOD ([facility]) No Growth At 24 Hours    BCID2 Panel [[id number]]  (Abnormal) Collected: 11/04/23 1722    Order Status: Completed Specimen: Blood Updated: 11/06/23 1441     CTX-M (ESBL ) N/A     IMP (Cabapenemase ) N/A     KPC resistance gene (Carbapenemase ) N/A     mcr-1 N/A     mecA ID N/A     Comment: Note: Antimicrobial resistance can occur via multiple mechanisms. A Not Detected result for antimicrobial resistance gene(s) does not indicate antimicrobial susceptibility. Subculturing is required for species identification and susceptibility testing of   isolates.        mecA/C and MREJ (MRSA) gene N/A     NDM (Carbapenemase ) N/A     OXA-48-like (Carbapenemase ) N/A     Candis/B (VRE gene) N/A     VIM (Carbapenemase ) N/A     " Enterococcus faecalis Not Detected     Enterococcus faecium Not Detected     Listeria monocytogenes Not Detected     Staphylococcus spp. Detected     Staphylococcus aureus Not Detected     Staphylococcus epidermidis Not Detected     Staphylococcus lugdunensis Not Detected     Streptococcus spp. Not Detected     Streptococcus agalactiae (Group B) Not Detected     Streptococcus pneumoniae Not Detected     Streptococcus pyogenes (Group A) Not Detected     Acinetobacter calcoaceticus/baumannii complex Not Detected     Bacteroides fragilis Not Detected     Enterobacterales Not Detected     Enterobacter cloacae complex Not Detected     Escherichia coli Not Detected     Klebsiella aerogenes Not Detected     Klebsiella oxytoca Not Detected     Klebsiella pneumoniae group Not Detected     Proteus spp. Not Detected     Salmonella spp. Not Detected     Serratia marcescens Not Detected     Haemophilus influenzae Not Detected     Neisseria meningitidis Not Detected     Pseudomonas aeruginosa Not Detected     Stenotrophomonas maltophilia Not Detected     Candida albicans Not Detected     Candida auris Not Detected     Candida glabrata Not Detected     Candida krusei Not Detected     Candida parapsilosis Not Detected     Candida tropicalis Not Detected     Cryptococcus neoformans/gattii Not Detected    Narrative:      The [employer] BCID2 Panel is a multiplexed nucleic acid test intended for the use with [employer]® 2.0 or [employer]® [employer] for the simultaneous qualitative detection and identification of multiple bacterial and yeast nucleic acids and select genetic determinants associated with antimicrobial resistance.  The [employer] BCID2 Panel test is performed directly on blood culture samples identified as positive by a continuous monitoring blood culture system.  Results are intended to be interpreted in conjunction with Gram stain results.    Blood culture #1 **CANNOT BE ORDERED STAT** [[id number]]   (Abnormal) Collected: 11/04/23 1722    Order Status: Completed Specimen: Blood Updated: 11/06/23 1330     GRAM STAIN Gram Positive Cocci, probable Staphylococcus      Seen in gram stain of broth only      1 of 2 Aerobic bottles positive    Urine culture [[id number]]  (Abnormal) Collected: 11/04/23 1739    Order Status: Completed Specimen: Urine Updated: 11/06/23 0637     Urine Culture >/= 100,000 colonies/ml Gram-negative Rods            MOST RECENT IMAGING  CT Head Without Contrast  Narrative: EXAMINATION:  CT HEAD WITHOUT CONTRAST    CLINICAL HISTORY:  Head trauma, minor (Age >= 65y);    TECHNIQUE:  Low dose axial images were obtained through the head.  Coronal and sagittal reformations were also performed. Contrast was not administered.    Automatic exposure control was utilized to reduce the patient's radiation dose.    DLP= 909    COMPARISON:  11/14/2013    FINDINGS:  No acute intracranial hemorrhage, edema or mass. No acute parenchymal abnormality.    Mild cerebral atrophy with concordant ventricular enlargement.    There is normal gray white differentiation.    Postsurgical changes of left parietal craniotomy with no acute abnormality.    The mastoid air cells are clear.    Debris noted within the left auditory canal.    The globes and orbital contents are normal bilaterally.    The visualized maxillary, ethmoid and sphenoid sinuses are clear.  Impression: No acute intracranial abnormality identified.  Findings of mild microvascular ischemic disease.    Electronically signed by: Chucho Veloz  Date:    11/04/2023  Time:    18:41  X-Ray Chest 1 View  Narrative: EXAMINATION:  XR CHEST 1 VIEW    CLINICAL HISTORY:  Fever, unspecified    TECHNIQUE:  Single view of the chest    COMPARISON:  08/30/2022    FINDINGS:  No focal opacification, pleural effusion, or pneumothorax.    Cardiomegaly with postsurgical changes of median sternotomy.    No acute osseous abnormality.  Impression: No acute cardiopulmonary  process.    Electronically signed by: Chucho Veloz  Date:    11/04/2023  Time:    17:24      LASTECHO  No results found for this or any previous visit.      CURRENT/PREVIOUS VISIT EKG  No results found for this or any previous visit.    ASSESSMENT/PLAN:     Active Hospital Problems    Diagnosis    *Acute cystitis without hematuria    Hypoglycemia    DM (diabetes mellitus)    DORCAS (acute kidney injury)    Encephalopathy, metabolic       ASSESSMENT & PLAN:   Will continue Levaquin.  Follow up on the blood cultures and urine cultures.  Right now could be that this is a skin contaminant because it is 1/2 cultures they are different for the blood verses the urine.  Close follow up on his cultures.  Gave 1 time dose of vancomycin will follow closely cultures throughout the night into tomorrow morning.      Discussed with the nurse taper down the D10 half-normal for the hypoglycemia now down to 50 cc an hour from 100 cc an hour earlier today.      Holding all diabetic medications until presumably the sulfonylureas have washed out.  Judicious re-initiation thereafter.      Acute injury from the kidney is likely due to urinary tract infection.  Consider renal ultrasound to look for any underlying etiologies.    Metabolic encephalopathy seems to be improving now that her sugars are normalizing.  Monitor overnight.            RECOMMENDATIONS:  DVT prophylaxis is technically with factor Xa inhibitor treatment for atrial fibrillation.  GI prophylaxis pantoprazole.    Time spent 40 minutes        Chuck Lucas III, MD  Department of Hospital Medicine ([facility])   Date of Service: 11/06/2023  10:31 PM

## 2023-11-07 NOTE — NURSING
Dr. Lucas updated on pt status. New orders noted!    Discussed case with nurse will in proximally 820.  Was not proximal to epic to give orders.  Verbal orders were given to draw a stat CMP assess renal function and dose vancomycin until we know exactly what type of staphylococcal species we are dealing with.

## 2023-11-08 LAB
ALBUMIN SERPL-MCNC: 2.6 G/DL (ref 3.4–4.8)
ALBUMIN/GLOB SERPL: 0.6 RATIO (ref 1.1–2)
ALP SERPL-CCNC: 162 UNIT/L (ref 40–150)
ALT SERPL-CCNC: 54 UNIT/L (ref 0–55)
AST SERPL-CCNC: 62 UNIT/L (ref 5–34)
BACTERIA BLD CULT: ABNORMAL
BASOPHILS # BLD AUTO: 0.03 X10(3)/MCL
BASOPHILS NFR BLD AUTO: 0.3 %
BILIRUB SERPL-MCNC: 1.8 MG/DL
BUN SERPL-MCNC: 24 MG/DL (ref 9.8–20.1)
CALCIUM SERPL-MCNC: 9.3 MG/DL (ref 8.4–10.2)
CHLORIDE SERPL-SCNC: 98 MMOL/L (ref 98–107)
CO2 SERPL-SCNC: 26 MMOL/L (ref 23–31)
CREAT SERPL-MCNC: 1.55 MG/DL (ref 0.55–1.02)
EOSINOPHIL # BLD AUTO: 0.12 X10(3)/MCL (ref 0–0.9)
EOSINOPHIL NFR BLD AUTO: 1.4 %
ERYTHROCYTE [DISTWIDTH] IN BLOOD BY AUTOMATED COUNT: 16 % (ref 11.5–17)
GFR SERPLBLD CREATININE-BSD FMLA CKD-EPI: 35 MLS/MIN/1.73/M2
GLOBULIN SER-MCNC: 4.1 GM/DL (ref 2.4–3.5)
GLUCOSE SERPL-MCNC: 210 MG/DL (ref 82–115)
GRAM STN SPEC: ABNORMAL
HCT VFR BLD AUTO: 37.2 % (ref 37–47)
HGB BLD-MCNC: 11.1 G/DL (ref 12–16)
IMM GRANULOCYTES # BLD AUTO: 0.06 X10(3)/MCL (ref 0–0.04)
IMM GRANULOCYTES NFR BLD AUTO: 0.7 %
LYMPHOCYTES # BLD AUTO: 0.68 X10(3)/MCL (ref 0.6–4.6)
LYMPHOCYTES NFR BLD AUTO: 7.7 %
MAGNESIUM SERPL-MCNC: 2.1 MG/DL (ref 1.6–2.6)
MCH RBC QN AUTO: 25.9 PG (ref 27–31)
MCHC RBC AUTO-ENTMCNC: 29.8 G/DL (ref 33–36)
MCV RBC AUTO: 86.7 FL (ref 80–94)
MONOCYTES # BLD AUTO: 0.73 X10(3)/MCL (ref 0.1–1.3)
MONOCYTES NFR BLD AUTO: 8.3 %
NEUTROPHILS # BLD AUTO: 7.18 X10(3)/MCL (ref 2.1–9.2)
NEUTROPHILS NFR BLD AUTO: 81.6 %
PHOSPHATE SERPL-MCNC: 2.7 MG/DL (ref 2.3–4.7)
PLATELET # BLD AUTO: 188 X10(3)/MCL (ref 130–400)
PMV BLD AUTO: 11.1 FL (ref 7.4–10.4)
POCT GLUCOSE: 166 MG/DL (ref 70–110)
POCT GLUCOSE: 181 MG/DL (ref 70–110)
POCT GLUCOSE: 182 MG/DL (ref 70–110)
POCT GLUCOSE: 195 MG/DL (ref 70–110)
POTASSIUM SERPL-SCNC: 4.2 MMOL/L (ref 3.5–5.1)
PROT SERPL-MCNC: 6.7 GM/DL (ref 5.8–7.6)
RBC # BLD AUTO: 4.29 X10(6)/MCL (ref 4.2–5.4)
SODIUM SERPL-SCNC: 134 MMOL/L (ref 136–145)
WBC # SPEC AUTO: 8.8 X10(3)/MCL (ref 4.5–11.5)

## 2023-11-08 PROCEDURE — 80053 COMPREHEN METABOLIC PANEL: CPT | Performed by: INTERNAL MEDICINE

## 2023-11-08 PROCEDURE — 27000221 HC OXYGEN, UP TO 24 HOURS

## 2023-11-08 PROCEDURE — 84100 ASSAY OF PHOSPHORUS: CPT | Performed by: INTERNAL MEDICINE

## 2023-11-08 PROCEDURE — 99900035 HC TECH TIME PER 15 MIN (STAT)

## 2023-11-08 PROCEDURE — 83735 ASSAY OF MAGNESIUM: CPT | Performed by: INTERNAL MEDICINE

## 2023-11-08 PROCEDURE — 25000003 PHARM REV CODE 250: Performed by: FAMILY MEDICINE

## 2023-11-08 PROCEDURE — A4216 STERILE WATER/SALINE, 10 ML: HCPCS | Performed by: FAMILY MEDICINE

## 2023-11-08 PROCEDURE — 25000003 PHARM REV CODE 250: Performed by: INTERNAL MEDICINE

## 2023-11-08 PROCEDURE — 20000000 HC ICU ROOM

## 2023-11-08 PROCEDURE — 85025 COMPLETE CBC W/AUTO DIFF WBC: CPT | Performed by: INTERNAL MEDICINE

## 2023-11-08 PROCEDURE — 63600175 PHARM REV CODE 636 W HCPCS: Performed by: INTERNAL MEDICINE

## 2023-11-08 PROCEDURE — 27000207 HC ISOLATION

## 2023-11-08 RX ORDER — SODIUM CHLORIDE 450 MG/100ML
INJECTION, SOLUTION INTRAVENOUS CONTINUOUS
Status: DISCONTINUED | OUTPATIENT
Start: 2023-11-08 | End: 2023-11-10

## 2023-11-08 RX ADMIN — APIXABAN 2.5 MG: 2.5 TABLET, FILM COATED ORAL at 09:11

## 2023-11-08 RX ADMIN — CHOLECALCIFEROL TAB 25 MCG (1000 UNIT) 2000 UNITS: 25 TAB at 09:11

## 2023-11-08 RX ADMIN — MEROPENEM 500 MG: 500 INJECTION, POWDER, FOR SOLUTION INTRAVENOUS at 02:11

## 2023-11-08 RX ADMIN — MUPIROCIN 1 G: 20 OINTMENT TOPICAL at 09:11

## 2023-11-08 RX ADMIN — FLUTICASONE PROPIONATE 50 MCG: 50 SPRAY, METERED NASAL at 09:11

## 2023-11-08 RX ADMIN — PANTOPRAZOLE SODIUM 40 MG: 40 TABLET, DELAYED RELEASE ORAL at 09:11

## 2023-11-08 RX ADMIN — SODIUM CHLORIDE, PRESERVATIVE FREE 10 ML: 5 INJECTION INTRAVENOUS at 05:11

## 2023-11-08 RX ADMIN — LEVOTHYROXINE SODIUM 50 MCG: 50 TABLET ORAL at 05:11

## 2023-11-08 RX ADMIN — EZETIMIBE 10 MG: 10 TABLET ORAL at 09:11

## 2023-11-08 RX ADMIN — MEROPENEM 300 MG: 1 INJECTION, POWDER, FOR SOLUTION INTRAVENOUS at 09:11

## 2023-11-08 RX ADMIN — SODIUM CHLORIDE: 4.5 INJECTION, SOLUTION INTRAVENOUS at 09:11

## 2023-11-08 RX ADMIN — VENLAFAXINE HYDROCHLORIDE 75 MG: 37.5 CAPSULE, EXTENDED RELEASE ORAL at 09:11

## 2023-11-08 NOTE — PT/OT/SLP PROGRESS
Physical Therapy      Patient Name:  Kaila Hammond   MRN:  [id number]    Patient not seen today secondary to already up in chair and walked with nsg, declines further PT service for today . Will follow-up tomorrow.

## 2023-11-08 NOTE — PROGRESS NOTES
----- Message from Josiane Soto RN sent at 11/1/2018  5:16 PM CDT -----  Contact: patient  I called pt and told her I was unsure if we received them or not. Informed her you would call with an update if you did receive that fax.     ----- Message -----  From: Paula Kenny  Sent: 11/1/2018   1:40 PM  To: Reji FRANCO Staff    Please call above patient wants to know if you received her medical records was faxed over on 10/29 waiting on a call back     [facility]  [address] [place] [address]  Phone: [phone]    (Hospital) Internal Medicine  Progress Note      PATIENT NAME: Kaila Hammond  MRN: [id number]  TODAY'S DATE: 11/07/2023  ADMIT DATE: 11/4/2023    SUBJECTIVE     PRINCIPLE PROBLEM: Acute cystitis without hematuria    INTERVAL HISTORY:    11/7/2023  Patient showed continued signs of improvement in terms of her blood sugars today.  Blood sugars have all trended up when she is been on D10 half-normal saline.  Sodium level slightly low around 130 blood sugars have trialed up to 150s to 2 teens.  She is normal sensorium she is behaving well eating and tolerating some food but she was a little gaseous distended.  She would 2 large bowel movements according to the nurse.    Also and additionally, 1/2 blood cultures came back positive for staph hominis.  She received 1 dose of vancomycin last night and I discontinued it after this and dentition patient of this organism.  Repeat BUN creatinine are improved.  Urine output improved overall she is stable blood pressures.    See documentation below.  Patient was allergic to cephalosporins with anaphylaxis penicillin with a rash and sulfa 2 on undetermined reactions.  Minimum inhibitory concentration for meropenem was 0.25.  Review of patient's allergies indicates:   Allergen Reactions    Augmentin [amoxicillin-pot clavulanate]     Cefaclor     Cephalexin     Penicillins     Sulfa (sulfonamide antibiotics)     Sulfamethoxazole-trimethoprim Hives       ROS negative except as mentioned above    OBJECTIVE     VITAL SIGNS (Most Recent)  Temp: 98.6 °F (37 °C) (11/07/23 1555)  Pulse: 80 (11/07/23 1555)  Resp: 16 (11/07/23 1555)  BP: (!) 141/76 (11/07/23 1555)  SpO2: 96 % (11/07/23 1555)    VENTILATION STATUS  Resp: 16 (11/07/23 1555)  SpO2: 96 % (11/07/23 1555)           I & O (Last 24H):  Intake/Output Summary (Last 24 hours) at 11/7/2023 6123  Last data filed at 11/7/2023  "1615  Gross per 24 hour   Intake 3341.83 ml   Output 2600 ml   Net 741.83 ml       WEIGHTS  Wt Readings from Last 3 Encounters:   11/04/23 2119 102.3 kg (225 lb 8.5 oz)   11/04/23 1657 99.8 kg (220 lb)   05/24/23 0833 104.3 kg (230 lb)   05/17/23 0849 104.3 kg (230 lb)       Physical Exam    On physical exam she is alert and oriented x3.  Cranial nerves 2-12 are intact.  Nurse Micheline is present.  She is in no apparent distress no JVD.  Regular rate and rhythm no rubs gallops murmurs clear to auscultation bilaterally soft nontender nondistended abdomen no clubbing cyanosis or edema bilateral lower extremities.  No flank pain bilaterally.    SCHEDULED MEDS:   apixaban  2.5 mg Oral BID    ezetimibe  10 mg Oral QHS    fluticasone propionate  1 spray Each Nostril BID    [START ON 11/8/2023] levoFLOXacin  750 mg Oral Every other day    levothyroxine  50 mcg Oral Before breakfast    mupirocin   Nasal BID    pantoprazole  40 mg Oral Daily    sodium chloride 0.9%  10 mL Intravenous Q6H    venlafaxine  75 mg Oral Daily    vitamin D  2,000 Units Oral Daily       CONTINUOUS INFUSIONS:    PRN MEDS:benzonatate, dextrose 10%, dextrose 10%, glucose, glucose, HYDROcodone-acetaminophen, sodium chloride 0.9%, Flushing PICC/Midline Protocol **AND** sodium chloride 0.9% **AND** sodium chloride 0.9%    LABS AND DIAGNOSTICS     CBC LAST 3 DAYS  Recent Labs   Lab 11/06/23  0411 11/06/23 2012 11/07/23  0346   WBC 7.66 6.09 6.01   RBC 3.75* 3.76* 3.87*   HGB 10.0* 9.9* 10.2*   HCT 32.6* 32.7* 33.4*   MCV 86.9 87.0 86.3   MCH 26.7* 26.3* 26.4*   MCHC 30.7* 30.3* 30.5*   RDW 15.9 15.9 15.9    129* 136   MPV 11.0* 10.9* 11.3*       COAGULATION LAST 3 DAYS  No results for input(s): "LABPT", "INR", "APTT" in the last 168 hours.    CHEMISTRY LAST 3 DAYS  Recent Labs   Lab 11/05/23  2352 11/06/23  0411 11/06/23 2012 11/07/23  0346   NA  --  133* 132* 131*   K  --  3.6 3.9 4.1   CO2  --  26 25 25   BUN  --  35.0* 34.0* 29.0*   CREATININE  --  " "2.06* 1.87* 1.69*   CALCIUM  --  8.8 8.7 9.0   PH 7.400  --   --   --    MG  --  2.00  --  2.00   ALBUMIN  --  2.5* 2.4* 2.4*   ALKPHOS  --  118 120 137   ALT  --  40 56* 57*   AST  --  90* 118* 115*   BILITOT  --  1.2 1.2 1.5       CARDIAC PROFILE LAST 3 DAYS  Recent Labs   Lab 11/04/23  1706 11/05/23  1601   * 286*       ENDOCRINE LAST 3 DAYS  No results for input(s): "TSH", "PROCAL" in the last 168 hours.    LAST ARTERIAL BLOOD GAS  ABG  Recent Labs   Lab 11/05/23  2352   PH 7.400   PO2 85   PCO2 39.4   HCO3 24.4   BE 0       LAST 7 DAYS MICROBIOLOGY   Microbiology Results (last 7 days)       Procedure Component Value Units Date/Time    Blood culture #2 **CANNOT BE ORDERED STAT** [[id number]]  (Normal) Collected: 11/04/23 1720    Order Status: Completed Specimen: Blood Updated: 11/07/23 1701     CULTURE, BLOOD ([facility]) No Growth At 48 Hours    Urine culture [[id number]]  (Abnormal)  (Susceptibility) Collected: 11/04/23 1739    Order Status: Completed Specimen: Urine Updated: 11/07/23 1015     Urine Culture >/= 100,000 colonies/ml Escherichia coli ESBL    Blood culture #1 **CANNOT BE ORDERED STAT** [[id number]]  (Abnormal) Collected: 11/04/23 1722    Order Status: Completed Specimen: Blood Updated: 11/07/23 0947     CULTURE, BLOOD ([facility]) Staphylococcus hominis     GRAM STAIN Gram Positive Cocci, probable Staphylococcus      Seen in gram stain of broth only      1 of 2 Aerobic bottles positive    BCID2 Panel [[id number]]  (Abnormal) Collected: 11/04/23 1722    Order Status: Completed Specimen: Blood Updated: 11/06/23 1441     CTX-M (ESBL ) N/A     IMP (Cabapenemase ) N/A     KPC resistance gene (Carbapenemase ) N/A     mcr-1 N/A     mecA ID N/A     Comment: Note: Antimicrobial resistance can occur via multiple mechanisms. A Not Detected result for antimicrobial resistance gene(s) does not indicate antimicrobial susceptibility. Subculturing is required for species identification and " susceptibility testing of   isolates.        mecA/C and MREJ (MRSA) gene N/A     NDM (Carbapenemase ) N/A     OXA-48-like (Carbapenemase ) N/A     Candis/B (VRE gene) N/A     VIM (Carbapenemase ) N/A     Enterococcus faecalis Not Detected     Enterococcus faecium Not Detected     Listeria monocytogenes Not Detected     Staphylococcus spp. Detected     Staphylococcus aureus Not Detected     Staphylococcus epidermidis Not Detected     Staphylococcus lugdunensis Not Detected     Streptococcus spp. Not Detected     Streptococcus agalactiae (Group B) Not Detected     Streptococcus pneumoniae Not Detected     Streptococcus pyogenes (Group A) Not Detected     Acinetobacter calcoaceticus/baumannii complex Not Detected     Bacteroides fragilis Not Detected     Enterobacterales Not Detected     Enterobacter cloacae complex Not Detected     Escherichia coli Not Detected     Klebsiella aerogenes Not Detected     Klebsiella oxytoca Not Detected     Klebsiella pneumoniae group Not Detected     Proteus spp. Not Detected     Salmonella spp. Not Detected     Serratia marcescens Not Detected     Haemophilus influenzae Not Detected     Neisseria meningitidis Not Detected     Pseudomonas aeruginosa Not Detected     Stenotrophomonas maltophilia Not Detected     Candida albicans Not Detected     Candida auris Not Detected     Candida glabrata Not Detected     Candida krusei Not Detected     Candida parapsilosis Not Detected     Candida tropicalis Not Detected     Cryptococcus neoformans/gattii Not Detected    Narrative:      The [employer] BCID2 Panel is a multiplexed nucleic acid test intended for the use with [employer]® 2.0 or [employer]ionic® [employer] Systems for the simultaneous qualitative detection and identification of multiple bacterial and yeast nucleic acids and select genetic determinants associated with antimicrobial resistance.  The [employer] BCID2 Panel test is performed directly on blood culture  samples identified as positive by a continuous monitoring blood culture system.  Results are intended to be interpreted in conjunction with Gram stain results.            MOST RECENT IMAGING  X-Ray Chest AP Portable  Narrative: EXAMINATION:  XR CHEST AP PORTABLE    CLINICAL HISTORY:  cough;, .    COMPARISON:  11/04/2023    FINDINGS:  An AP view or more reveals the heart to be mildly enlarged.  The trachea is midline.  Right central line/MediPort is unchanged.  No definite infiltrate or effusion is seen.  There is mild elevation of the right hemidiaphragm.  Atherosclerosis seen within the aorta.  Post sternotomy changes are present.  Impression: 1. Mild cardiomegaly  2. Right central line/MediPort  3. Mildly elevated right hemidiaphragm  4. Atherosclerosis    Electronically signed by: Ilia Shay  Date:    11/07/2023  Time:    14:49  X-Ray Abdomen AP 1 View  Narrative: EXAMINATION:  XR ABDOMEN AP 1 VIEW    CLINICAL HISTORY:  bloated;, .    COMPARISON:  None available    FINDINGS:  Single AP or more views reveal a nonspecific bowel gas pattern without evidence of obstruction. Gas and feces are seen within the colon.  Postsurgical changes are noted to the right upper abdomen and lumbosacral spine as well as the pelvis.  Impression: 1. No diagnostic acute abdominal abnormality identified.    Electronically signed by: Ilia Shay  Date:    11/07/2023  Time:    14:48      LASTECHO  No results found for this or any previous visit.      CURRENT/PREVIOUS VISIT EKG  No results found for this or any previous visit.    ASSESSMENT/PLAN:     Active Hospital Problems    Diagnosis    *Acute cystitis without hematuria    Urinary tract infection due to extended-spectrum beta lactamase (ESBL) producing Escherichia coli    Hypoglycemia    DM (diabetes mellitus)    DORCAS (acute kidney injury)    Encephalopathy, metabolic       ASSESSMENT & PLAN:   Patient with acute cystitis without hematuria but ESBL positive.  The dilemma here is  that the patient has a variable allergy but it was a rash.  I am going to gradually incrementally desensitize her and give her a test dose in the morning when she is been off of beta-blockers and she can be observed in the ICU in case if there is any anaphylaxis.  She admits that she definitely had anaphylaxis to cephalosporins.  She also has a reaction to Bactrim that does not really matter because she is only has an minimum inhibitory concentration of 20.  Thus will test dose Merrem sometime tomorrow while she is in the setting of the ICU observed.  It will be 1/4 of the dose initially.    Continue current monitoring of her sugars for AC and HS.  Will place her on a low-dose sliding scale    Encephalopathy resolving.    RECOMMENDATIONS:  DVT prophylaxis with apixaban.  GI prophylaxis with pantoprazole        Chuck Lucas III, MD  Department of Hospital Medicine ([facility])   Date of Service: 11/07/2023  7:05 PM

## 2023-11-08 NOTE — PHYSICIAN QUERY
PT Name: Kaila Hammond  MR #: [id number]     DOCUMENTATION CLARIFICATION   Arianne Chow, RN, CCDS    [email or web address]    Documentation Excellence  This form is a permanent document in the medical record.     Query Date: November 8, 2023    By submitting this query, we are merely seeking further clarification of documentation.    Please utilize your independent clinical judgment when addressing the question(s) below.  The Medical Record contains the following:  Indicators Supporting Clinical Findings Location in Medical Record   x HR         RR          BP          Temp -DATE-  T  P  R  BP >/=  Sat %   O2  Device 11/4  103.1-96.4  106-88  19-21  97/60  90 % on RA  2 L w/%    11/5  96.5-99.9  66-89  18-20  101/53    2L  11/6  97.7  70-73  14-18  117/73  95-96  RA  11/7  97.5-98.6  75-80  16-20  134/73    RA 11/8  97-97.9  78-82  15-20  130/79  93-97  1 L   NC      presenting with Altered Mental Status (Cough, confusion, started today fever 103.1 upon arrival)  Sinus Tachycardia  Hypoxic on RA 90% sats, not in respiratory distress  Chills confusion falls over last 2 days Vs flow sheet  Provider & RN notes            Ed md           x WBC           Bands          CRP  Lactic Acid          Procalcitonin     11/04 11/05 11/05 11/06 11/7 11/8   WBC 12.27 (H) 11.20   7.66  6.09 6.01 8.80   Platelet   172 154   142  129 136 188   Cr  2.34 (H) 2.60 (H) 2.14 (H) 2.06 (H)  1.87 1.69 1.55   T bili 1.7 (H) 1.3 1.1 1.2  1.2 1.5 1.8   Lactate  1.7                    lab   x Culture(s) Flu/covid/pna and rsv all negative.     11/4  UCx  >/= 100,000 colonies/ml Escherichia coli ESBL       11/4 BC                       Staphylococcus hominis                                      GRAM STAIN     Gram Positive Cocci, probable Staphylococcus                                          Seen in gram stain of broth only                                           1 of 2 Aerobic bottles positive       11/4  BC    No  "Growth At 48 Hours    1/2 blood cultures came back positive for staph hominis.      H&P    Lab                  11/7 Hosp PN     x AMS, Confusion, LOC, etc. Chills,   increased confusion & falls over last 2 days  Metabolic encephalopathy Ed md rai Organ Dysfunction  /Failure Acute kidney injury  Metabolic encephalopathy Ed md rai Bacteremia or Sepsis / Septic Sepsis work up Ed md rai Known or Suspected Source of Infection documented  Acute cystitis w/o hematuria        *Acute cystitis without hematuria  UTI d/t ESBL producing E Coli Ed md    11/7 [facility]osp [facility]    (Failed) Outpatient Treatment     x Medication Vancomycin IVPB on 11/6  Meropenem IVPB on 11/8  Levofloxacin PO start 11/6  Levofloxacin IVPB 11/-6    IVF:  D5 & NS @ 125 ml/hr 11/5-7  NS @ 125 ml/hr on 11/4  NS @ 100 ml/hr 11/4-5 1/2 NS @ 50 ml/hr start 11/8   Inpt mar   x Treatment Sepsis work up    I do not want to give her a fluid bolus at this time as I do not know if she can tolerate 30 mL/kg bolus      11/05/23  2352   PH 7.400   PO2 85   PCO2 39.4   HCO3 24.4   BE 0     "She received 1 dose of vancomycin last night and I discontinued it after this and dentition patient of this organism". Ed md    Ed ,d      ABGs            11/7 Hosp PN      Other       Provider, please specify diagnosis or diagnoses associated with above clinical findings.                    - possible multiple part response -     [   ] Sepsis due to ESBL UTI & acute cystitis    and   Present on admit = yes     [   ] Sepsis ruled in   - further specify:     - source: ______________    - organism: ___________    - specify Present on admit status:   [   ] Yes (Y)                          [   ] No (N)                             [   ] Documentation insufficient to determine if condition is POA (U)                  [  ] Clinically Undetermined (W)     [  X ] Severe Sepsis       With organ dysfunction:  Acute kidney injury & metabolic encephalopathy      Source:  ESBL  UTI & acute " cystitis     Present on admit = yes     [   ] Severe Sepsis ruled in   - further specify:     - source: ______________    - organism: ___________    - organ dysfunction: ______    - specify Present on admit status:   [   ] Yes (Y)                          [   ] No (N)                             [   ] Documentation insufficient to determine if condition is POA (U)                  [  ] Clinically Undetermined (W)     [   ] SIRS  with  infection  but  without  Sepsis     [   ] Other - specify __________     [  ]     Clinically Undetermined      Please document in your progress notes daily for the duration of treatment until resolved and include in your discharge summary.

## 2023-11-09 LAB
ALBUMIN SERPL-MCNC: 2.4 G/DL (ref 3.4–4.8)
ALBUMIN/GLOB SERPL: 0.6 RATIO (ref 1.1–2)
ALP SERPL-CCNC: 147 UNIT/L (ref 40–150)
ALT SERPL-CCNC: 39 UNIT/L (ref 0–55)
AST SERPL-CCNC: 36 UNIT/L (ref 5–34)
BASOPHILS # BLD AUTO: 0.03 X10(3)/MCL
BASOPHILS NFR BLD AUTO: 0.4 %
BILIRUB SERPL-MCNC: 1.6 MG/DL
BUN SERPL-MCNC: 22 MG/DL (ref 9.8–20.1)
CALCIUM SERPL-MCNC: 9.3 MG/DL (ref 8.4–10.2)
CHLORIDE SERPL-SCNC: 101 MMOL/L (ref 98–107)
CO2 SERPL-SCNC: 25 MMOL/L (ref 23–31)
CREAT SERPL-MCNC: 1.16 MG/DL (ref 0.55–1.02)
EOSINOPHIL # BLD AUTO: 0.11 X10(3)/MCL (ref 0–0.9)
EOSINOPHIL NFR BLD AUTO: 1.6 %
ERYTHROCYTE [DISTWIDTH] IN BLOOD BY AUTOMATED COUNT: 16 % (ref 11.5–17)
GFR SERPLBLD CREATININE-BSD FMLA CKD-EPI: 50 MLS/MIN/1.73/M2
GLOBULIN SER-MCNC: 3.9 GM/DL (ref 2.4–3.5)
GLUCOSE SERPL-MCNC: 160 MG/DL (ref 82–115)
HCT VFR BLD AUTO: 35.7 % (ref 37–47)
HGB BLD-MCNC: 10.7 G/DL (ref 12–16)
IMM GRANULOCYTES # BLD AUTO: 0.06 X10(3)/MCL (ref 0–0.04)
IMM GRANULOCYTES NFR BLD AUTO: 0.8 %
LYMPHOCYTES # BLD AUTO: 0.5 X10(3)/MCL (ref 0.6–4.6)
LYMPHOCYTES NFR BLD AUTO: 7.1 %
MAGNESIUM SERPL-MCNC: 2 MG/DL (ref 1.6–2.6)
MCH RBC QN AUTO: 25.7 PG (ref 27–31)
MCHC RBC AUTO-ENTMCNC: 30 G/DL (ref 33–36)
MCV RBC AUTO: 85.8 FL (ref 80–94)
MONOCYTES # BLD AUTO: 0.59 X10(3)/MCL (ref 0.1–1.3)
MONOCYTES NFR BLD AUTO: 8.3 %
NEUTROPHILS # BLD AUTO: 5.8 X10(3)/MCL (ref 2.1–9.2)
NEUTROPHILS NFR BLD AUTO: 81.8 %
PHOSPHATE SERPL-MCNC: 2.9 MG/DL (ref 2.3–4.7)
PLATELET # BLD AUTO: 226 X10(3)/MCL (ref 130–400)
PMV BLD AUTO: 10.2 FL (ref 7.4–10.4)
POCT GLUCOSE: 153 MG/DL (ref 70–110)
POCT GLUCOSE: 174 MG/DL (ref 70–110)
POCT GLUCOSE: 176 MG/DL (ref 70–110)
POCT GLUCOSE: 227 MG/DL (ref 70–110)
POTASSIUM SERPL-SCNC: 4.1 MMOL/L (ref 3.5–5.1)
PROT SERPL-MCNC: 6.3 GM/DL (ref 5.8–7.6)
RBC # BLD AUTO: 4.16 X10(6)/MCL (ref 4.2–5.4)
SODIUM SERPL-SCNC: 137 MMOL/L (ref 136–145)
WBC # SPEC AUTO: 7.09 X10(3)/MCL (ref 4.5–11.5)

## 2023-11-09 PROCEDURE — 27000207 HC ISOLATION

## 2023-11-09 PROCEDURE — 11000001 HC ACUTE MED/SURG PRIVATE ROOM

## 2023-11-09 PROCEDURE — 83735 ASSAY OF MAGNESIUM: CPT | Performed by: INTERNAL MEDICINE

## 2023-11-09 PROCEDURE — 84100 ASSAY OF PHOSPHORUS: CPT | Performed by: INTERNAL MEDICINE

## 2023-11-09 PROCEDURE — 85025 COMPLETE CBC W/AUTO DIFF WBC: CPT | Performed by: INTERNAL MEDICINE

## 2023-11-09 PROCEDURE — 21400001 HC TELEMETRY ROOM

## 2023-11-09 PROCEDURE — 80053 COMPREHEN METABOLIC PANEL: CPT | Performed by: INTERNAL MEDICINE

## 2023-11-09 PROCEDURE — 25000003 PHARM REV CODE 250: Performed by: FAMILY MEDICINE

## 2023-11-09 PROCEDURE — 63600175 PHARM REV CODE 636 W HCPCS: Performed by: INTERNAL MEDICINE

## 2023-11-09 PROCEDURE — 94761 N-INVAS EAR/PLS OXIMETRY MLT: CPT

## 2023-11-09 PROCEDURE — 25000003 PHARM REV CODE 250: Performed by: INTERNAL MEDICINE

## 2023-11-09 PROCEDURE — 27000221 HC OXYGEN, UP TO 24 HOURS

## 2023-11-09 PROCEDURE — 97116 GAIT TRAINING THERAPY: CPT

## 2023-11-09 PROCEDURE — 97530 THERAPEUTIC ACTIVITIES: CPT

## 2023-11-09 RX ORDER — METOPROLOL SUCCINATE 25 MG/1
25 TABLET, EXTENDED RELEASE ORAL 2 TIMES DAILY
Status: DISCONTINUED | OUTPATIENT
Start: 2023-11-09 | End: 2023-11-15 | Stop reason: HOSPADM

## 2023-11-09 RX ADMIN — METOPROLOL SUCCINATE ER TABLETS 25 MG: 25 TABLET, FILM COATED, EXTENDED RELEASE ORAL at 08:11

## 2023-11-09 RX ADMIN — CHOLECALCIFEROL TAB 25 MCG (1000 UNIT) 2000 UNITS: 25 TAB at 09:11

## 2023-11-09 RX ADMIN — VENLAFAXINE HYDROCHLORIDE 75 MG: 37.5 CAPSULE, EXTENDED RELEASE ORAL at 09:11

## 2023-11-09 RX ADMIN — SODIUM CHLORIDE: 4.5 INJECTION, SOLUTION INTRAVENOUS at 09:11

## 2023-11-09 RX ADMIN — HYDROCODONE BITARTRATE AND ACETAMINOPHEN 1 TABLET: 5; 325 TABLET ORAL at 02:11

## 2023-11-09 RX ADMIN — APIXABAN 2.5 MG: 2.5 TABLET, FILM COATED ORAL at 09:11

## 2023-11-09 RX ADMIN — PANTOPRAZOLE SODIUM 40 MG: 40 TABLET, DELAYED RELEASE ORAL at 09:11

## 2023-11-09 RX ADMIN — MEROPENEM 1 G: 1 INJECTION, POWDER, FOR SOLUTION INTRAVENOUS at 09:11

## 2023-11-09 RX ADMIN — MEROPENEM 1 G: 1 INJECTION, POWDER, FOR SOLUTION INTRAVENOUS at 04:11

## 2023-11-09 RX ADMIN — FLUTICASONE PROPIONATE 50 MCG: 50 SPRAY, METERED NASAL at 08:11

## 2023-11-09 RX ADMIN — APIXABAN 2.5 MG: 2.5 TABLET, FILM COATED ORAL at 08:11

## 2023-11-09 RX ADMIN — MUPIROCIN 1 G: 20 OINTMENT TOPICAL at 08:11

## 2023-11-09 RX ADMIN — FLUTICASONE PROPIONATE 50 MCG: 50 SPRAY, METERED NASAL at 09:11

## 2023-11-09 RX ADMIN — MUPIROCIN 1 G: 20 OINTMENT TOPICAL at 09:11

## 2023-11-09 RX ADMIN — EZETIMIBE 10 MG: 10 TABLET ORAL at 08:11

## 2023-11-09 RX ADMIN — HYDROCODONE BITARTRATE AND ACETAMINOPHEN 1 TABLET: 5; 325 TABLET ORAL at 04:11

## 2023-11-09 RX ADMIN — LEVOTHYROXINE SODIUM 50 MCG: 50 TABLET ORAL at 06:11

## 2023-11-09 NOTE — PT/OT/SLP PROGRESS
Physical Therapy Treatment    Patient Name:  Kaila Hammond   MRN:  [id number]    Recommendations:     Discharge Recommendations: hm   Discharge Equipment Recommendations: walker, rolling  Barriers to discharge: None    Assessment:     Kaila Hammond is a 73 y.o. female admitted with a medical diagnosis of Acute cystitis without hematuria.  She presents with the following impairments/functional limitations: weakness, impaired endurance, impaired self care skills, impaired functional mobility, gait instability .    Rehab Prognosis: Good; patient would benefit from acute skilled PT services to address these deficits and reach maximum level of function.    Recent Surgery: * No surgery found *      Plan:     During this hospitalization, patient to be seen 5 x/week to address the identified rehab impairments via gait training, therapeutic activities, therapeutic exercises and progress toward the following goals:    Plan of Care Expires:       Subjective     Chief Complaint: Pt up on BSC ready to get up  Patient/Family Comments/goals: home  Pain/Comfort:         Objective:     Communicated with pt, nurse prior to session.  Patient found  up on BSC  with   request to get up upon PT entry to room.     General Precautions: Standard,    Orthopedic Precautions:    Braces:    Respiratory Status: Room air     Functional Mobility:  Transfers:     Sit to Stand:  minimum assistance with rolling walker  Gait: amb 50fx2t RW Keiko      AM-PAC 6 CLICK MOBILITY          Treatment & Education:  TF to chair Keiko    Patient left up in chair with all lines intact, call button in reach, and nurse present..    GOALS:   Multidisciplinary Problems       Physical Therapy Goals          Problem: Physical Therapy    Goal Priority Disciplines Outcome Goal Variances Interventions   Physical Therapy Goal     PT, PT/OT Ongoing, Progressing     Description: 1.  Pt will improve bed mob to SBA  2.  Pt will improve tf to chair SBA  3.  Pt will amb 50ft  OLGA SBA  4.  Pt will be I HEP to maintain strength while inpt                       Time Tracking:     PT Received On: 11/09/23  PT Start Time: 0930     PT Stop Time: 0945  PT Total Time (min): 15 min     Billable Minutes: Gait Training 15  Ther Act 0    Treatment Type: Treatment  PT/PTA: PT           11/09/2023

## 2023-11-09 NOTE — PROGRESS NOTES
[facility]  [address] [place] [address]  Phone: [phone]    (Hospital) Internal Medicine  Progress Note      PATIENT NAME: Kaila Hammond  MRN: [id number]  TODAY'S DATE: 11/08/2023  ADMIT DATE: 11/4/2023    SUBJECTIVE     PRINCIPLE PROBLEM: Acute cystitis without hematuria    INTERVAL HISTORY:    11/8/2023  Patient complains of some insomnia today.  She also has a mild cough.  She underwent a test dose of meropenem x2 without any signs of anaphylaxis.    Review of patient's allergies indicates:   Allergen Reactions    Augmentin [amoxicillin-pot clavulanate]     Cefaclor     Cephalexin     Penicillins     Sulfa (sulfonamide antibiotics)     Sulfamethoxazole-trimethoprim Hives       ROS negative except as mentioned above    OBJECTIVE     VITAL SIGNS (Most Recent)  Temp: 96.8 °F (36 °C) (11/08/23 1608)  Pulse: 85 (11/08/23 1600)  Resp: 19 (11/08/23 1600)  BP: (!) 152/76 (11/08/23 1600)  SpO2: (!) 94 % (11/08/23 1600)    VENTILATION STATUS  Resp: 19 (11/08/23 1600)  SpO2: (!) 94 % (11/08/23 1600)           I & O (Last 24H):  Intake/Output Summary (Last 24 hours) at 11/8/2023 1934  Last data filed at 11/8/2023 1726  Gross per 24 hour   Intake 2545.27 ml   Output 1400 ml   Net 1145.27 ml       WEIGHTS  Wt Readings from Last 3 Encounters:   11/04/23 2119 102.3 kg (225 lb 8.5 oz)   11/04/23 1657 99.8 kg (220 lb)   05/24/23 0833 104.3 kg (230 lb)   05/17/23 0849 104.3 kg (230 lb)       Physical Exam    On physical exam she is alert orient x3.  Cranial nerves 2-10 are intact.  Kendrick nurse bedside present.  She is regular rate and rhythm no rubs gallops or murmurs no JVD clear to auscultation bilaterally soft nontender nondistended no flank pain.  Abdominal exam is obese obese abdomen without any issues.  No clubbing cyanosis or edema bilateral lower extremities.    SCHEDULED MEDS:   apixaban  2.5 mg Oral BID    ezetimibe  10 mg Oral QHS    fluticasone propionate  1 spray Each Nostril  "BID    levothyroxine  50 mcg Oral Before breakfast    mupirocin   Nasal BID    pantoprazole  40 mg Oral Daily    sodium chloride 0.9%  10 mL Intravenous Q6H    venlafaxine  75 mg Oral Daily    vitamin D  2,000 Units Oral Daily       CONTINUOUS INFUSIONS:   sodium chloride 0.45% 50 mL/hr at 11/08/23 1726       PRN MEDS:benzonatate, dextrose 10%, dextrose 10%, glucose, glucose, HYDROcodone-acetaminophen, sodium chloride 0.9%, Flushing PICC/Midline Protocol **AND** sodium chloride 0.9% **AND** sodium chloride 0.9%    LABS AND DIAGNOSTICS     CBC LAST 3 DAYS  Recent Labs   Lab 11/06/23 2012 11/07/23 0346 11/08/23 0347   WBC 6.09 6.01 8.80   RBC 3.76* 3.87* 4.29   HGB 9.9* 10.2* 11.1*   HCT 32.7* 33.4* 37.2   MCV 87.0 86.3 86.7   MCH 26.3* 26.4* 25.9*   MCHC 30.3* 30.5* 29.8*   RDW 15.9 15.9 16.0   * 136 188   MPV 10.9* 11.3* 11.1*       COAGULATION LAST 3 DAYS  No results for input(s): "LABPT", "INR", "APTT" in the last 168 hours.    CHEMISTRY LAST 3 DAYS  Recent Labs   Lab 11/05/23  2352 11/06/23  0411 11/06/23 2012 11/07/23 0346 11/08/23 0347   NA  --  133* 132* 131* 134*   K  --  3.6 3.9 4.1 4.2   CO2  --  26 25 25 26   BUN  --  35.0* 34.0* 29.0* 24.0*   CREATININE  --  2.06* 1.87* 1.69* 1.55*   CALCIUM  --  8.8 8.7 9.0 9.3   PH 7.400  --   --   --   --    MG  --  2.00  --  2.00 2.10   ALBUMIN  --  2.5* 2.4* 2.4* 2.6*   ALKPHOS  --  118 120 137 162*   ALT  --  40 56* 57* 54   AST  --  90* 118* 115* 62*   BILITOT  --  1.2 1.2 1.5 1.8*       CARDIAC PROFILE LAST 3 DAYS  Recent Labs   Lab 11/04/23  1706 11/05/23  1601   * 286*       ENDOCRINE LAST 3 DAYS  No results for input(s): "TSH", "PROCAL" in the last 168 hours.    LAST ARTERIAL BLOOD GAS  ABG  Recent Labs   Lab 11/05/23  2352   PH 7.400   PO2 85   PCO2 39.4   HCO3 24.4   BE 0       LAST 7 DAYS MICROBIOLOGY   Microbiology Results (last 7 days)       Procedure Component Value Units Date/Time    Blood culture #2 **CANNOT BE ORDERED STAT** " [[id number]]  (Normal) Collected: 11/04/23 1720    Order Status: Completed Specimen: Blood Updated: 11/08/23 1702     CULTURE, BLOOD ([facility]) No Growth At 72 Hours    Blood culture #1 **CANNOT BE ORDERED STAT** [[id number]]  (Abnormal)  (Susceptibility) Collected: 11/04/23 1722    Order Status: Completed Specimen: Blood Updated: 11/08/23 0652     CULTURE, BLOOD ([facility]) Staphylococcus hominis     GRAM STAIN Gram Positive Cocci, probable Staphylococcus      Seen in gram stain of broth only      1 of 2 Aerobic bottles positive    Urine culture [[id number]]  (Abnormal)  (Susceptibility) Collected: 11/04/23 1739    Order Status: Completed Specimen: Urine Updated: 11/07/23 1015     Urine Culture >/= 100,000 colonies/ml Escherichia coli ESBL    BCID2 Panel [[id number]]  (Abnormal) Collected: 11/04/23 1722    Order Status: Completed Specimen: Blood Updated: 11/06/23 1441     CTX-M (ESBL ) N/A     IMP (Cabapenemase ) N/A     KPC resistance gene (Carbapenemase ) N/A     mcr-1 N/A     mecA ID N/A     Comment: Note: Antimicrobial resistance can occur via multiple mechanisms. A Not Detected result for antimicrobial resistance gene(s) does not indicate antimicrobial susceptibility. Subculturing is required for species identification and susceptibility testing of   isolates.        mecA/C and MREJ (MRSA) gene N/A     NDM (Carbapenemase ) N/A     OXA-48-like (Carbapenemase ) N/A     Candis/B (VRE gene) N/A     VIM (Carbapenemase ) N/A     Enterococcus faecalis Not Detected     Enterococcus faecium Not Detected     Listeria monocytogenes Not Detected     Staphylococcus spp. Detected     Staphylococcus aureus Not Detected     Staphylococcus epidermidis Not Detected     Staphylococcus lugdunensis Not Detected     Streptococcus spp. Not Detected     Streptococcus agalactiae (Group B) Not Detected     Streptococcus pneumoniae Not Detected     Streptococcus pyogenes (Group A) Not Detected      Acinetobacter calcoaceticus/baumannii complex Not Detected     Bacteroides fragilis Not Detected     Enterobacterales Not Detected     Enterobacter cloacae complex Not Detected     Escherichia coli Not Detected     Klebsiella aerogenes Not Detected     Klebsiella oxytoca Not Detected     Klebsiella pneumoniae group Not Detected     Proteus spp. Not Detected     Salmonella spp. Not Detected     Serratia marcescens Not Detected     Haemophilus influenzae Not Detected     Neisseria meningitidis Not Detected     Pseudomonas aeruginosa Not Detected     Stenotrophomonas maltophilia Not Detected     Candida albicans Not Detected     Candida auris Not Detected     Candida glabrata Not Detected     Candida krusei Not Detected     Candida parapsilosis Not Detected     Candida tropicalis Not Detected     Cryptococcus neoformans/gattii Not Detected    Narrative:      The [employer] BCID2 Panel is a multiplexed nucleic acid test intended for the use with [employer]® [employer].0 or [employer]® [employer] Systems for the simultaneous qualitative detection and identification of multiple bacterial and yeast nucleic acids and select genetic determinants associated with antimicrobial resistance.  The [employer] BCID2 Panel test is performed directly on blood culture samples identified as positive by a continuous monitoring blood culture system.  Results are intended to be interpreted in conjunction with Gram stain results.            MOST RECENT IMAGING  X-Ray Chest AP Portable  Narrative: EXAMINATION:  XR CHEST AP PORTABLE    CLINICAL HISTORY:  cough;, .    COMPARISON:  11/04/2023    FINDINGS:  An AP view or more reveals the heart to be mildly enlarged.  The trachea is midline.  Right central line/MediPort is unchanged.  No definite infiltrate or effusion is seen.  There is mild elevation of the right hemidiaphragm.  Atherosclerosis seen within the aorta.  Post sternotomy changes are present.  Impression: 1. Mild cardiomegaly  2. Right  central line/MediPort  3. Mildly elevated right hemidiaphragm  4. Atherosclerosis    Electronically signed by: Ilia Shay  Date:    11/07/2023  Time:    14:49  X-Ray Abdomen AP 1 View  Narrative: EXAMINATION:  XR ABDOMEN AP 1 VIEW    CLINICAL HISTORY:  bloated;, .    COMPARISON:  None available    FINDINGS:  Single AP or more views reveal a nonspecific bowel gas pattern without evidence of obstruction. Gas and feces are seen within the colon.  Postsurgical changes are noted to the right upper abdomen and lumbosacral spine as well as the pelvis.  Impression: 1. No diagnostic acute abdominal abnormality identified.    Electronically signed by: Ilia Shay  Date:    11/07/2023  Time:    14:48      LASTECHO  No results found for this or any previous visit.      CURRENT/PREVIOUS VISIT EKG  No results found for this or any previous visit.    ASSESSMENT/PLAN:     Active Hospital Problems    Diagnosis    *Acute cystitis without hematuria    Urinary tract infection due to extended-spectrum beta lactamase (ESBL) producing Escherichia coli    Hypoglycemia    DM (diabetes mellitus)    DORCAS (acute kidney injury)    Encephalopathy, metabolic       ASSESSMENT & PLAN:   Patient with ESBL urinary tract infection.  Had to perform desensitization dosing for her since she has an allergy reported to penicillin.  So far doing well.  Will need to minimize the treatment and to limited to 5 days.  She is received Levaquin but that was inadequate.  The oral Levaquin stopped.  (Staph hominis 1/2 bottles likely a contaminant.  Will continue to monitor clinically.)    Patient's hypoglycemia has resolved.  I am changing her to low sliding scale insulin now that she is tolerating her doses of antibiotics.  I do not want hypotension and hypoglycemia concurrently.    Will continue to follow her acute kidney injury and transitioned to the floor tomorrow to 24 hours of tolerance to her dose of antibiotics.      RECOMMENDATIONS:  DVT prophylaxis  apixaban GI prophylaxis pantoprazole        Chuck Lucas III, MD  Department of Hospital Medicine ([facility])   Date of Service: 11/08/2023  7:34 PM

## 2023-11-09 NOTE — PLAN OF CARE
11/06/23 0921   Discharge Assessment   Assessment Type Discharge Planning Assessment   Source of Information patient   People in Home spouse   Do you expect to return to your current living situation? Yes   Do you have help at home or someone to help you manage your care at home? Yes   Prior to hospitilization cognitive status: Alert/Oriented;No Deficits   Current cognitive status: Alert/Oriented;No Deficits   Equipment Currently Used at Home none   Do you currently have service(s) that help you manage your care at home? No   Do you take prescription medications? Yes   Do you have prescription coverage? Yes   Do you have any problems affording any of your prescribed medications? No   Is the patient taking medications as prescribed? yes   Who is going to help you get home at discharge? spouse   How do you get to doctors appointments? family or friend will provide   Are you on dialysis? No   Do you take coumadin? No   DME Needed Upon Discharge  walker, rolling   Discharge Plan discussed with: Patient   Transition of Care Barriers None   Discharge Plan A Home with family;Home Health   Discharge Plan B Home with family;Home Health   Physical Activity   On average, how many days per week do you engage in moderate to strenuous exercise (like a brisk walk)? Patient refu   On average, how many minutes do you engage in exercise at this level? Patient refu   Financial Resource Strain   How hard is it for you to pay for the very basics like food, housing, medical care, and heating? Patient refu   Housing Stability   In the last 12 months, was there a time when you were not able to pay the mortgage or rent on time? MT   In the last 12 months, was there a time when you did not have a steady place to sleep or slept in a shelter (including now)? MT   Transportation Needs   In the past 12 months, has lack of transportation kept you from medical appointments or from getting medications? Patient refu   In the past 12 months, has  lack of transportation kept you from meetings, work, or from getting things needed for daily living? Patient refu   Food Insecurity   Within the past 12 months, you worried that your food would run out before you got the money to buy more. Patient refu   Within the past 12 months, the food you bought just didn't last and you didn't have money to get more. Patient refu   Stress   Do you feel stress - tense, restless, nervous, or anxious, or unable to sleep at night because your mind is troubled all the time - these days? Patient refu   Social Connections   In a typical week, how many times do you talk on the phone with family, friends, or neighbors? Patient refu   How often do you get together with friends or relatives? Patient refu   How often do you attend Sabianist or Zoroastrianism services? Patient refu   Do you belong to any clubs or organizations such as Sabianist groups, unions, fraternal or athletic groups, or school groups? Patient refu   How often do you attend meetings of the clubs or organizations you belong to? Patient refu   Are you , , , , never , or living with a partner? Patient refu   Alcohol Use   Q1: How often do you have a drink containing alcohol? Patient refu   Q2: How many drinks containing alcohol do you have on a typical day when you are drinking? Patient refu   Q3: How often do you have six or more drinks on one occasion? Patient refu

## 2023-11-09 NOTE — PROGRESS NOTES
[facility]  [address] [place] [address]  Phone: [phone]    (Hospital) Internal Medicine  Progress Note      PATIENT NAME: Kaila Hammond  MRN: [id number]  TODAY'S DATE: 11/09/2023  ADMIT DATE: 11/4/2023    SUBJECTIVE     PRINCIPLE PROBLEM: Acute cystitis without hematuria    INTERVAL HISTORY:    11/9/2023  Patient has done fine without any complications or signs of anaphylaxis on the carbapenem last 24 hours.  She received her 1st full dose this morning the ICU.  She is more alert feels better and states she is getting stronger today.  She is participating with physical therapy.  She is urinating her blood sugarsare stabilizing she is on sliding scale insulin low dose.    Review of patient's allergies indicates:   Allergen Reactions    Augmentin [amoxicillin-pot clavulanate]     Cefaclor     Cephalexin     Penicillins     Sulfa (sulfonamide antibiotics)     Sulfamethoxazole-trimethoprim Hives       ROS -14 point review of systems except as mentioned above    OBJECTIVE     VITAL SIGNS (Most Recent)  Temp: 97 °F (36.1 °C) (11/09/23 1115)  Pulse: 89 (11/09/23 1115)  Resp: 15 (11/09/23 1115)  BP: (!) 156/93 (11/09/23 1102)  SpO2: 99 % (11/09/23 1115)    VENTILATION STATUS  Resp: 15 (11/09/23 1115)  SpO2: 99 % (11/09/23 1115)           I & O (Last 24H):  Intake/Output Summary (Last 24 hours) at 11/9/2023 1343  Last data filed at 11/9/2023 1126  Gross per 24 hour   Intake 725 ml   Output 1400 ml   Net -675 ml       WEIGHTS  Wt Readings from Last 3 Encounters:   11/04/23 2119 102.3 kg (225 lb 8.5 oz)   11/04/23 1657 99.8 kg (220 lb)   05/24/23 0833 104.3 kg (230 lb)   05/17/23 0849 104.3 kg (230 lb)       Physical Exam    Patient is alert orient x3.  Cranial nerves 2-12 are intact.  She is resting in the chair.  No JVD regular rate and rhythm no rubs gallops or murmurs clear to auscultation bilaterally soft nontender nondistended abdomen no clubbing cyanosis or edema bilateral  "lower extremities.  No heel breakdown bilaterally.    SCHEDULED MEDS:   apixaban  2.5 mg Oral BID    ezetimibe  10 mg Oral QHS    fluticasone propionate  1 spray Each Nostril BID    levothyroxine  50 mcg Oral Before breakfast    meropenem (MERREM) IVPB  1 g Intravenous Q8H    mupirocin   Nasal BID    pantoprazole  40 mg Oral Daily    sodium chloride 0.9%  10 mL Intravenous Q6H    venlafaxine  75 mg Oral Daily    vitamin D  2,000 Units Oral Daily       CONTINUOUS INFUSIONS:   sodium chloride 0.45% 50 mL/hr at 11/09/23 0916       PRN MEDS:benzonatate, dextrose 10%, dextrose 10%, glucose, glucose, HYDROcodone-acetaminophen, sodium chloride 0.9%, Flushing PICC/Midline Protocol **AND** sodium chloride 0.9% **AND** sodium chloride 0.9%    LABS AND DIAGNOSTICS     CBC LAST 3 DAYS  Recent Labs   Lab 11/07/23  0346 11/08/23 0347 11/09/23  0359   WBC 6.01 8.80 7.09   RBC 3.87* 4.29 4.16*   HGB 10.2* 11.1* 10.7*   HCT 33.4* 37.2 35.7*   MCV 86.3 86.7 85.8   MCH 26.4* 25.9* 25.7*   MCHC 30.5* 29.8* 30.0*   RDW 15.9 16.0 16.0    188 226   MPV 11.3* 11.1* 10.2       COAGULATION LAST 3 DAYS  No results for input(s): "LABPT", "INR", "APTT" in the last 168 hours.    CHEMISTRY LAST 3 DAYS  Recent Labs   Lab 11/05/23  2352 11/06/23  0411 11/07/23  0346 11/08/23  0347 11/09/23  0359   NA  --    < > 131* 134* 137   K  --    < > 4.1 4.2 4.1   CO2  --    < > 25 26 25   BUN  --    < > 29.0* 24.0* 22.0*   CREATININE  --    < > 1.69* 1.55* 1.16*   CALCIUM  --    < > 9.0 9.3 9.3   PH 7.400  --   --   --   --    MG  --    < > 2.00 2.10 2.00   ALBUMIN  --    < > 2.4* 2.6* 2.4*   ALKPHOS  --    < > 137 162* 147   ALT  --    < > 57* 54 39   AST  --    < > 115* 62* 36*   BILITOT  --    < > 1.5 1.8* 1.6*    < > = values in this interval not displayed.       CARDIAC PROFILE LAST 3 DAYS  Recent Labs   Lab 11/04/23  1706 11/05/23  1601   * 286*       ENDOCRINE LAST 3 DAYS  No results for input(s): "TSH", "PROCAL" in the last 168 " hours.    LAST ARTERIAL BLOOD GAS  ABG  Recent Labs   Lab 11/05/23  2352   PH 7.400   PO2 85   PCO2 39.4   HCO3 24.4   BE 0       LAST 7 DAYS MICROBIOLOGY   Microbiology Results (last 7 days)       Procedure Component Value Units Date/Time    Blood culture #2 **CANNOT BE ORDERED STAT** [[id number]]  (Normal) Collected: 11/04/23 1720    Order Status: Completed Specimen: Blood Updated: 11/08/23 1702     CULTURE, BLOOD ([facility]) No Growth At 72 Hours    Blood culture #1 **CANNOT BE ORDERED STAT** [[id number]]  (Abnormal)  (Susceptibility) Collected: 11/04/23 1722    Order Status: Completed Specimen: Blood Updated: 11/08/23 0652     CULTURE, BLOOD ([facility]) Staphylococcus hominis     GRAM STAIN Gram Positive Cocci, probable Staphylococcus      Seen in gram stain of broth only      1 of 2 Aerobic bottles positive    Urine culture [[id number]]  (Abnormal)  (Susceptibility) Collected: 11/04/23 1739    Order Status: Completed Specimen: Urine Updated: 11/07/23 1015     Urine Culture >/= 100,000 colonies/ml Escherichia coli ESBL    BCID2 Panel [[id number]]  (Abnormal) Collected: 11/04/23 1722    Order Status: Completed Specimen: Blood Updated: 11/06/23 1441     CTX-M (ESBL ) N/A     IMP (Cabapenemase ) N/A     KPC resistance gene (Carbapenemase ) N/A     mcr-1 N/A     mecA ID N/A     Comment: Note: Antimicrobial resistance can occur via multiple mechanisms. A Not Detected result for antimicrobial resistance gene(s) does not indicate antimicrobial susceptibility. Subculturing is required for species identification and susceptibility testing of   isolates.        mecA/C and MREJ (MRSA) gene N/A     NDM (Carbapenemase ) N/A     OXA-48-like (Carbapenemase ) N/A     Candis/B (VRE gene) N/A     VIM (Carbapenemase ) N/A     Enterococcus faecalis Not Detected     Enterococcus faecium Not Detected     Listeria monocytogenes Not Detected     Staphylococcus spp. Detected     Staphylococcus aureus  Not Detected     Staphylococcus epidermidis Not Detected     Staphylococcus lugdunensis Not Detected     Streptococcus spp. Not Detected     Streptococcus agalactiae (Group B) Not Detected     Streptococcus pneumoniae Not Detected     Streptococcus pyogenes (Group A) Not Detected     Acinetobacter calcoaceticus/baumannii complex Not Detected     Bacteroides fragilis Not Detected     Enterobacterales Not Detected     Enterobacter cloacae complex Not Detected     Escherichia coli Not Detected     Klebsiella aerogenes Not Detected     Klebsiella oxytoca Not Detected     Klebsiella pneumoniae group Not Detected     Proteus spp. Not Detected     Salmonella spp. Not Detected     Serratia marcescens Not Detected     Haemophilus influenzae Not Detected     Neisseria meningitidis Not Detected     Pseudomonas aeruginosa Not Detected     Stenotrophomonas maltophilia Not Detected     Candida albicans Not Detected     Candida auris Not Detected     Candida glabrata Not Detected     Candida krusei Not Detected     Candida parapsilosis Not Detected     Candida tropicalis Not Detected     Cryptococcus neoformans/gattii Not Detected    Narrative:      The [employer] BCID2 Panel is a multiplexed nucleic acid test intended for the use with [employer]® 2.0 or [employer]® [employer] Systems for the simultaneous qualitative detection and identification of multiple bacterial and yeast nucleic acids and select genetic determinants associated with antimicrobial resistance.  The [employer] BCID2 Panel test is performed directly on blood culture samples identified as positive by a continuous monitoring blood culture system.  Results are intended to be interpreted in conjunction with Gram stain results.            MOST RECENT IMAGING  X-Ray Chest AP Portable  Narrative: EXAMINATION:  XR CHEST AP PORTABLE    CLINICAL HISTORY:  cough;, .    COMPARISON:  11/04/2023    FINDINGS:  An AP view or more reveals the heart to be mildly enlarged.   The trachea is midline.  Right central line/MediPort is unchanged.  No definite infiltrate or effusion is seen.  There is mild elevation of the right hemidiaphragm.  Atherosclerosis seen within the aorta.  Post sternotomy changes are present.  Impression: 1. Mild cardiomegaly  2. Right central line/MediPort  3. Mildly elevated right hemidiaphragm  4. Atherosclerosis    Electronically signed by: Ilia Shay  Date:    11/07/2023  Time:    14:49  X-Ray Abdomen AP 1 View  Narrative: EXAMINATION:  XR ABDOMEN AP 1 VIEW    CLINICAL HISTORY:  bloated;, .    COMPARISON:  None available    FINDINGS:  Single AP or more views reveal a nonspecific bowel gas pattern without evidence of obstruction. Gas and feces are seen within the colon.  Postsurgical changes are noted to the right upper abdomen and lumbosacral spine as well as the pelvis.  Impression: 1. No diagnostic acute abdominal abnormality identified.    Electronically signed by: Ilia Shay  Date:    11/07/2023  Time:    14:48      LASTECHO  No results found for this or any previous visit.      CURRENT/PREVIOUS VISIT EKG  No results found for this or any previous visit.    ASSESSMENT/PLAN:     Active Hospital Problems    Diagnosis    *Acute cystitis without hematuria    Urinary tract infection due to extended-spectrum beta lactamase (ESBL) producing Escherichia coli    Hypoglycemia    DM (diabetes mellitus)    DORCAS (acute kidney injury)    Encephalopathy, metabolic       ASSESSMENT & PLAN:   Will continue her antibiotics now that she is at full dose day 1 yesterday was a test dose and not therapeutic.  Day 0 of 5 will be 11/09/2023 she will be concluded on 11/14/2023 next week.  Patient improving.      Continue to monitor sugars sliding scale insulin.      Acute kidney injury is resolving continue fluids for 24 more hours I will discontinue those tomorrow.    RECOMMENDATIONS:  Continue DVT prophylaxis with her Xa inhibitor.  GI prophylaxis pan resolved.   Pantoprazole        Chuck Lucas III, MD  Department of Hospital Medicine ([facility])   Date of Service: 11/09/2023  1:43 PM

## 2023-11-09 NOTE — PT/OT/SLP PROGRESS
Physical Therapy Treatment    Patient Name:  Kaila Hammond   MRN:  [id number]    Recommendations:     Discharge Recommendations: hm   Discharge Equipment Recommendations: walker, rolling  Barriers to discharge: None    Assessment:     Kaila Hammond is a 73 y.o. female admitted with a medical diagnosis of Acute cystitis without hematuria.  She presents with the following impairments/functional limitations: weakness, impaired endurance, impaired self care skills, impaired functional mobility, gait instability .    Rehab Prognosis: Good; patient would benefit from acute skilled PT services to address these deficits and reach maximum level of function.    Recent Surgery: * No surgery found *      Plan:     During this hospitalization, patient to be seen 5 x/week to address the identified rehab impairments via gait training, therapeutic activities, therapeutic exercises and progress toward the following goals:    Plan of Care Expires:       Subjective     Chief Complaint: Pt up in chair ready to get up to BSC and walk  Patient/Family Comments/goals: home  Pain/Comfort:         Objective:     Communicated with pt, nurse prior to session.  Patient found  up in chair  with   request to get up to BSC and walk with PT upon PT entry to room.     General Precautions: Standard,    Orthopedic Precautions:    Braces:    Respiratory Status: Room air     Functional Mobility:  Transfers:     Sit to Stand:  minimum assistance with rolling walker  Gait: amb to BSC RW Keiko      AM-PAC 6 CLICK MOBILITY          Treatment & Education:  TF to bsc Keiko    Patient left up on BSC with all lines intact, call button in reach, and nurse present..    GOALS:   Multidisciplinary Problems       Physical Therapy Goals          Problem: Physical Therapy    Goal Priority Disciplines Outcome Goal Variances Interventions   Physical Therapy Goal     PT, PT/OT Ongoing, Progressing     Description: 1.  Pt will improve bed mob to SBA  2.  Pt will  improve tf to chair SBA  3.  Pt will amb 50ft RW SBA  4.  Pt will be I HEP to maintain strength while inpt                       Time Tracking:     PT Received On: 11/09/23  PT Start Time: 0852     PT Stop Time: 0900  PT Total Time (min): 8 min     Billable Minutes: Gait Training 0  Ther Act 8    Treatment Type: Treatment  PT/PTA: PT           11/09/2023

## 2023-11-10 PROBLEM — R53.1 WEAKNESS: Status: ACTIVE | Noted: 2023-11-10

## 2023-11-10 LAB
ALBUMIN SERPL-MCNC: 2.6 G/DL (ref 3.4–4.8)
ALBUMIN/GLOB SERPL: 0.6 RATIO (ref 1.1–2)
ALP SERPL-CCNC: 176 UNIT/L (ref 40–150)
ALT SERPL-CCNC: 35 UNIT/L (ref 0–55)
AST SERPL-CCNC: 29 UNIT/L (ref 5–34)
BACTERIA BLD CULT: NORMAL
BILIRUB SERPL-MCNC: 1.3 MG/DL
BUN SERPL-MCNC: 17 MG/DL (ref 9.8–20.1)
CALCIUM SERPL-MCNC: 9.4 MG/DL (ref 8.4–10.2)
CHLORIDE SERPL-SCNC: 101 MMOL/L (ref 98–107)
CO2 SERPL-SCNC: 26 MMOL/L (ref 23–31)
CREAT SERPL-MCNC: 1.33 MG/DL (ref 0.55–1.02)
GFR SERPLBLD CREATININE-BSD FMLA CKD-EPI: 43 MLS/MIN/1.73/M2
GLOBULIN SER-MCNC: 4.1 GM/DL (ref 2.4–3.5)
GLUCOSE SERPL-MCNC: 203 MG/DL (ref 82–115)
MAGNESIUM SERPL-MCNC: 2 MG/DL (ref 1.6–2.6)
PHOSPHATE SERPL-MCNC: 2.7 MG/DL (ref 2.3–4.7)
POCT GLUCOSE: 147 MG/DL (ref 70–110)
POCT GLUCOSE: 160 MG/DL (ref 70–110)
POCT GLUCOSE: 238 MG/DL (ref 70–110)
POCT GLUCOSE: 282 MG/DL (ref 70–110)
POTASSIUM SERPL-SCNC: 4.1 MMOL/L (ref 3.5–5.1)
PROT SERPL-MCNC: 6.7 GM/DL (ref 5.8–7.6)
SODIUM SERPL-SCNC: 137 MMOL/L (ref 136–145)

## 2023-11-10 PROCEDURE — 25000003 PHARM REV CODE 250: Performed by: FAMILY MEDICINE

## 2023-11-10 PROCEDURE — 11000001 HC ACUTE MED/SURG PRIVATE ROOM

## 2023-11-10 PROCEDURE — 25000003 PHARM REV CODE 250: Performed by: INTERNAL MEDICINE

## 2023-11-10 PROCEDURE — 63600175 PHARM REV CODE 636 W HCPCS: Performed by: INTERNAL MEDICINE

## 2023-11-10 PROCEDURE — 27000207 HC ISOLATION

## 2023-11-10 PROCEDURE — 94761 N-INVAS EAR/PLS OXIMETRY MLT: CPT

## 2023-11-10 PROCEDURE — 83735 ASSAY OF MAGNESIUM: CPT | Performed by: INTERNAL MEDICINE

## 2023-11-10 PROCEDURE — 80053 COMPREHEN METABOLIC PANEL: CPT | Performed by: INTERNAL MEDICINE

## 2023-11-10 PROCEDURE — 99900035 HC TECH TIME PER 15 MIN (STAT)

## 2023-11-10 PROCEDURE — 21400001 HC TELEMETRY ROOM

## 2023-11-10 PROCEDURE — A4216 STERILE WATER/SALINE, 10 ML: HCPCS | Performed by: FAMILY MEDICINE

## 2023-11-10 PROCEDURE — 84100 ASSAY OF PHOSPHORUS: CPT | Performed by: INTERNAL MEDICINE

## 2023-11-10 PROCEDURE — 97116 GAIT TRAINING THERAPY: CPT

## 2023-11-10 RX ORDER — INSULIN ASPART 100 [IU]/ML
0-5 INJECTION, SOLUTION INTRAVENOUS; SUBCUTANEOUS
Status: DISCONTINUED | OUTPATIENT
Start: 2023-11-10 | End: 2023-11-15 | Stop reason: HOSPADM

## 2023-11-10 RX ADMIN — METOPROLOL SUCCINATE ER TABLETS 25 MG: 25 TABLET, FILM COATED, EXTENDED RELEASE ORAL at 09:11

## 2023-11-10 RX ADMIN — HYDROCODONE BITARTRATE AND ACETAMINOPHEN 1 TABLET: 5; 325 TABLET ORAL at 06:11

## 2023-11-10 RX ADMIN — EZETIMIBE 10 MG: 10 TABLET ORAL at 09:11

## 2023-11-10 RX ADMIN — APIXABAN 2.5 MG: 2.5 TABLET, FILM COATED ORAL at 09:11

## 2023-11-10 RX ADMIN — INSULIN ASPART 3 UNITS: 100 INJECTION, SOLUTION INTRAVENOUS; SUBCUTANEOUS at 01:11

## 2023-11-10 RX ADMIN — PANTOPRAZOLE SODIUM 40 MG: 40 TABLET, DELAYED RELEASE ORAL at 09:11

## 2023-11-10 RX ADMIN — VENLAFAXINE HYDROCHLORIDE 75 MG: 37.5 CAPSULE, EXTENDED RELEASE ORAL at 09:11

## 2023-11-10 RX ADMIN — INSULIN ASPART 1 UNITS: 100 INJECTION, SOLUTION INTRAVENOUS; SUBCUTANEOUS at 10:11

## 2023-11-10 RX ADMIN — CHOLECALCIFEROL TAB 25 MCG (1000 UNIT) 2000 UNITS: 25 TAB at 09:11

## 2023-11-10 RX ADMIN — MEROPENEM 1 G: 1 INJECTION, POWDER, FOR SOLUTION INTRAVENOUS at 10:11

## 2023-11-10 RX ADMIN — SODIUM CHLORIDE, PRESERVATIVE FREE 10 ML: 5 INJECTION INTRAVENOUS at 12:11

## 2023-11-10 RX ADMIN — MEROPENEM 1 G: 1 INJECTION, POWDER, FOR SOLUTION INTRAVENOUS at 01:11

## 2023-11-10 RX ADMIN — MEROPENEM 1 G: 1 INJECTION, POWDER, FOR SOLUTION INTRAVENOUS at 09:11

## 2023-11-10 NOTE — PROGRESS NOTES
Pharmacist Renal Dose Adjustment Note    Kaila Hammond is a 73 y.o. female being treated with the medication meropenem    Patient Data:    Vital Signs (Most Recent):  Temp: 97.4 °F (36.3 °C) (11/10/23 1408)  Pulse: 94 (11/10/23 1408)  Resp: 20 (11/10/23 1408)  BP: (!) 154/87 (11/10/23 1408)  SpO2: 98 % (11/10/23 1408) Vital Signs (72h Range):  Temp:  [96.8 °F (36 °C)-98.8 °F (37.1 °C)]   Pulse:  []   Resp:  [13-25]   BP: (114-180)/(5-114)   SpO2:  [90 %-100 %]      Recent Labs   Lab 11/08/23  0347 11/09/23  0359 11/10/23  0353   CREATININE 1.55* 1.16* 1.33*     Serum creatinine: 1.33 mg/dL (H) 11/10/23 0353  Estimated creatinine clearance: 47 mL/min (A)    Medication:meropenem dose: 1 gram frequency every 8 hours will be changed to medication:meropenem dose:1 gram frequency:every 12 hours.    Pharmacist's Name: Sivan Reyes  Pharmacist's Extension: [phone]

## 2023-11-10 NOTE — PT/OT/SLP PROGRESS
"Physical Therapy Treatment    Patient Name:  Kaila Hammond   MRN:  [id number]    Recommendations:     Discharge Recommendations:    Discharge Equipment Recommendations: walker, rolling  Barriers to discharge:  pt current medical status    Assessment:     Kaila Hammond is a 73 y.o. female admitted with a medical diagnosis of Acute cystitis without hematuria.  She presents with the following impairments/functional limitations: weakness, impaired endurance, impaired self care skills, impaired functional mobility, gait instability     Pt presents up in chair expressing readiness to go back to bed stating "I did not sleep well." Pt declined to walk further than from the chair to the bed due to feeling tired. Pt required CGA for sit to stand transfers as well as ambulation of 5ft with RW and bed mobility sit to supine.    Rehab Prognosis: Good; patient would benefit from acute skilled PT services to address these deficits and reach maximum level of function.    Recent Surgery: * No surgery found *      Plan:     During this hospitalization, patient to be seen 5 x/week to address the identified rehab impairments via gait training, therapeutic activities, therapeutic exercises and progress toward the following goals:    Plan of Care Expires:       Subjective     Chief Complaint: "I am tired and didn't sleep well"  Patient/Family Comments/goals: To go home  Pain/Comfort:         Objective:     Communicated with pt and nurse prior to session.  Patient found HOB elevated with   upon PT entry to room.     General Precautions: Standard, fall  Orthopedic Precautions:    Braces:    Respiratory Status: Room air     Functional Mobility:  Bed Mobility:     Sit to Supine: contact guard assistance  Transfers:     Sit to Stand:  contact guard assistance with rolling walker  Gait: Pt amb 5ft from chair to bed with CGA and declined to ambulate further due to fatigue      AM-PAC 6 CLICK MOBILITY          Treatment & Education:  See " above treatment    Patient left HOB elevated with all lines intact, call button in reach, and bed alarm on..    GOALS:   Multidisciplinary Problems       Physical Therapy Goals          Problem: Physical Therapy    Goal Priority Disciplines Outcome Goal Variances Interventions   Physical Therapy Goal     PT, PT/OT Ongoing, Progressing     Description: 1.  Pt will improve bed mob to SBA  2.  Pt will improve tf to chair SBA  3.  Pt will amb 50ft RW SBA  4.  Pt will be I HEP to maintain strength while inpt                       Time Tracking:     PT Received On: 11/10/23  PT Start Time: 0915     PT Stop Time: 0930  PT Total Time (min): 15 min     Billable Minutes: Gait Training 15    Treatment Type: Treatment  PT/PTA: PT           11/10/2023

## 2023-11-11 PROBLEM — R09.02 HYPOXEMIA: Status: ACTIVE | Noted: 2023-11-11

## 2023-11-11 LAB
ALBUMIN SERPL-MCNC: 2.7 G/DL (ref 3.4–4.8)
ALBUMIN/GLOB SERPL: 0.7 RATIO (ref 1.1–2)
ALP SERPL-CCNC: 159 UNIT/L (ref 40–150)
ALT SERPL-CCNC: 29 UNIT/L (ref 0–55)
AST SERPL-CCNC: 30 UNIT/L (ref 5–34)
BASOPHILS # BLD AUTO: 0.07 X10(3)/MCL
BASOPHILS NFR BLD AUTO: 0.8 %
BILIRUB SERPL-MCNC: 1.5 MG/DL
BUN SERPL-MCNC: 13 MG/DL (ref 9.8–20.1)
CALCIUM SERPL-MCNC: 9.1 MG/DL (ref 8.4–10.2)
CHLORIDE SERPL-SCNC: 102 MMOL/L (ref 98–107)
CO2 SERPL-SCNC: 26 MMOL/L (ref 23–31)
CREAT SERPL-MCNC: 1.3 MG/DL (ref 0.55–1.02)
EOSINOPHIL # BLD AUTO: 0.09 X10(3)/MCL (ref 0–0.9)
EOSINOPHIL NFR BLD AUTO: 1.1 %
ERYTHROCYTE [DISTWIDTH] IN BLOOD BY AUTOMATED COUNT: 16.3 % (ref 11.5–17)
GFR SERPLBLD CREATININE-BSD FMLA CKD-EPI: 44 MLS/MIN/1.73/M2
GLOBULIN SER-MCNC: 4.1 GM/DL (ref 2.4–3.5)
GLUCOSE SERPL-MCNC: 186 MG/DL (ref 82–115)
HCT VFR BLD AUTO: 37.1 % (ref 37–47)
HGB BLD-MCNC: 11 G/DL (ref 12–16)
IMM GRANULOCYTES # BLD AUTO: 0.13 X10(3)/MCL (ref 0–0.04)
IMM GRANULOCYTES NFR BLD AUTO: 1.5 %
LYMPHOCYTES # BLD AUTO: 0.89 X10(3)/MCL (ref 0.6–4.6)
LYMPHOCYTES NFR BLD AUTO: 10.5 %
MAGNESIUM SERPL-MCNC: 1.9 MG/DL (ref 1.6–2.6)
MCH RBC QN AUTO: 25.8 PG (ref 27–31)
MCHC RBC AUTO-ENTMCNC: 29.6 G/DL (ref 33–36)
MCV RBC AUTO: 87.1 FL (ref 80–94)
MONOCYTES # BLD AUTO: 0.85 X10(3)/MCL (ref 0.1–1.3)
MONOCYTES NFR BLD AUTO: 10 %
NEUTROPHILS # BLD AUTO: 6.46 X10(3)/MCL (ref 2.1–9.2)
NEUTROPHILS NFR BLD AUTO: 76.1 %
PHOSPHATE SERPL-MCNC: 3.5 MG/DL (ref 2.3–4.7)
PLATELET # BLD AUTO: 322 X10(3)/MCL (ref 130–400)
PMV BLD AUTO: 9.4 FL (ref 7.4–10.4)
POCT GLUCOSE: 163 MG/DL (ref 70–110)
POCT GLUCOSE: 167 MG/DL (ref 70–110)
POCT GLUCOSE: 226 MG/DL (ref 70–110)
POTASSIUM SERPL-SCNC: 3.8 MMOL/L (ref 3.5–5.1)
PROT SERPL-MCNC: 6.8 GM/DL (ref 5.8–7.6)
RBC # BLD AUTO: 4.26 X10(6)/MCL (ref 4.2–5.4)
SODIUM SERPL-SCNC: 139 MMOL/L (ref 136–145)
WBC # SPEC AUTO: 8.49 X10(3)/MCL (ref 4.5–11.5)

## 2023-11-11 PROCEDURE — 27000221 HC OXYGEN, UP TO 24 HOURS

## 2023-11-11 PROCEDURE — 63600175 PHARM REV CODE 636 W HCPCS: Performed by: INTERNAL MEDICINE

## 2023-11-11 PROCEDURE — 27000207 HC ISOLATION

## 2023-11-11 PROCEDURE — 25000003 PHARM REV CODE 250: Performed by: FAMILY MEDICINE

## 2023-11-11 PROCEDURE — 97530 THERAPEUTIC ACTIVITIES: CPT

## 2023-11-11 PROCEDURE — 94761 N-INVAS EAR/PLS OXIMETRY MLT: CPT

## 2023-11-11 PROCEDURE — 25000003 PHARM REV CODE 250: Performed by: INTERNAL MEDICINE

## 2023-11-11 PROCEDURE — 83735 ASSAY OF MAGNESIUM: CPT | Performed by: INTERNAL MEDICINE

## 2023-11-11 PROCEDURE — A4216 STERILE WATER/SALINE, 10 ML: HCPCS | Performed by: FAMILY MEDICINE

## 2023-11-11 PROCEDURE — 11000001 HC ACUTE MED/SURG PRIVATE ROOM

## 2023-11-11 PROCEDURE — 84100 ASSAY OF PHOSPHORUS: CPT | Performed by: INTERNAL MEDICINE

## 2023-11-11 PROCEDURE — 97116 GAIT TRAINING THERAPY: CPT

## 2023-11-11 PROCEDURE — 85025 COMPLETE CBC W/AUTO DIFF WBC: CPT | Performed by: INTERNAL MEDICINE

## 2023-11-11 PROCEDURE — 21400001 HC TELEMETRY ROOM

## 2023-11-11 PROCEDURE — 80053 COMPREHEN METABOLIC PANEL: CPT | Performed by: INTERNAL MEDICINE

## 2023-11-11 RX ADMIN — CHOLECALCIFEROL TAB 25 MCG (1000 UNIT) 2000 UNITS: 25 TAB at 10:11

## 2023-11-11 RX ADMIN — PANTOPRAZOLE SODIUM 40 MG: 40 TABLET, DELAYED RELEASE ORAL at 10:11

## 2023-11-11 RX ADMIN — APIXABAN 2.5 MG: 2.5 TABLET, FILM COATED ORAL at 09:11

## 2023-11-11 RX ADMIN — SODIUM CHLORIDE, PRESERVATIVE FREE 10 ML: 5 INJECTION INTRAVENOUS at 03:11

## 2023-11-11 RX ADMIN — SITAGLIPTIN 50 MG: 50 TABLET, FILM COATED ORAL at 03:11

## 2023-11-11 RX ADMIN — METOPROLOL SUCCINATE ER TABLETS 25 MG: 25 TABLET, FILM COATED, EXTENDED RELEASE ORAL at 10:11

## 2023-11-11 RX ADMIN — APIXABAN 2.5 MG: 2.5 TABLET, FILM COATED ORAL at 10:11

## 2023-11-11 RX ADMIN — MEROPENEM 1 G: 1 INJECTION, POWDER, FOR SOLUTION INTRAVENOUS at 09:11

## 2023-11-11 RX ADMIN — METOPROLOL SUCCINATE ER TABLETS 25 MG: 25 TABLET, FILM COATED, EXTENDED RELEASE ORAL at 09:11

## 2023-11-11 RX ADMIN — INSULIN ASPART 2 UNITS: 100 INJECTION, SOLUTION INTRAVENOUS; SUBCUTANEOUS at 11:11

## 2023-11-11 RX ADMIN — MEROPENEM 1 G: 1 INJECTION, POWDER, FOR SOLUTION INTRAVENOUS at 10:11

## 2023-11-11 RX ADMIN — HYDROCODONE BITARTRATE AND ACETAMINOPHEN 1 TABLET: 5; 325 TABLET ORAL at 06:11

## 2023-11-11 RX ADMIN — LEVOTHYROXINE SODIUM 50 MCG: 50 TABLET ORAL at 05:11

## 2023-11-11 RX ADMIN — EZETIMIBE 10 MG: 10 TABLET ORAL at 09:11

## 2023-11-11 RX ADMIN — VENLAFAXINE HYDROCHLORIDE 75 MG: 37.5 CAPSULE, EXTENDED RELEASE ORAL at 10:11

## 2023-11-11 NOTE — PT/OT/SLP PROGRESS
Physical Therapy Treatment    Patient Name:  Kaila Hammond   MRN:  [id number]    Recommendations:     Discharge Recommendations:    Discharge Equipment Recommendations: walker, rolling  Barriers to discharge: None    Assessment:     Kaila Hammond is a 73 y.o. female admitted with a medical diagnosis of Acute cystitis without hematuria.  She presents with the following impairments/functional limitations: weakness, impaired endurance, gait instability, impaired balance.    Patient c/o weakness, but improving gradually with her strength and independence.  Performed exercises in addition and ambulating, followed by sitting in chair with encouragement from her sister.    Rehab Prognosis: Good; patient would benefit from acute skilled PT services to address these deficits and reach maximum level of function.    Recent Surgery: * No surgery found *      Plan:     During this hospitalization, patient to be seen 5 x/week to address the identified rehab impairments via gait training, therapeutic activities, therapeutic exercises and progress toward the following goals:    Plan of Care Expires:       Subjective     Chief Complaint: weakness  Patient/Family Comments/goals: improve strength to go home  Pain/Comfort:  Pain Rating 1: 0/10      Objective:     Communicated with nurse prior to session.  Patient found HOB elevated with oxygen upon PT entry to room.     General Precautions: Standard, fall  Orthopedic Precautions:    Braces:    Respiratory Status: Nasal cannula, flow 2 L/min     Functional Mobility:  Bed Mobility:     Scooting: minimum assistance  Supine to Sit: minimum assistance  Transfers:     Sit to Stand:  minimum assistance with rolling walker  Gait: 40 ft min a with RW      AM-PAC 6 CLICK MOBILITY          Treatment & Education:  Standing marches, seated LAQs and ankle pumps    Patient left up in chair with call button in reach and sister present..    GOALS:   Multidisciplinary Problems       Physical  Therapy Goals          Problem: Physical Therapy    Goal Priority Disciplines Outcome Goal Variances Interventions   Physical Therapy Goal     PT, PT/OT Ongoing, Progressing     Description: 1.  Pt will improve bed mob to SBA  2.  Pt will improve tf to chair SBA  3.  Pt will amb 50ft RW SBA  4.  Pt will be I HEP to maintain strength while inpt                       Time Tracking:     PT Received On: 11/11/23  PT Start Time: 0855     PT Stop Time: 0918  PT Total Time (min): 23 min     Billable Minutes: Gait Training 10 and Therapeutic Activity 13    Treatment Type: Treatment  PT/PTA: PT           11/11/2023

## 2023-11-11 NOTE — PROGRESS NOTES
[facility]  [address] [place] [address]  Phone: [phone]    (Hospital) Internal Medicine  Progress Note      PATIENT NAME: Kaila Hammond  MRN: [id number]  TODAY'S DATE: 11/10/2023  ADMIT DATE: 11/4/2023    SUBJECTIVE     PRINCIPLE PROBLEM: Acute cystitis without hematuria    INTERVAL HISTORY:    11/10/2023  Patient continues to tolerate Merrem without any difficulty from an anaphylaxis standpoint.  She states she feels better.  She did not rest very well last night.  She was little anxious.  For some reason was placed on oxygen.  I discussed that with Respiratory therapist and they are assessing this morning.  O2 sats have been in the upper 90s today.    She is eating she is tolerating her meals.  Pharmacy help me renally dose back down q.12 on Merrem.    Review of patient's allergies indicates:   Allergen Reactions    Augmentin [amoxicillin-pot clavulanate]     Cefaclor     Cephalexin     Penicillins     Sulfa (sulfonamide antibiotics)     Sulfamethoxazole-trimethoprim Hives       ROS negative except as mentioned above 14 point review of systems    OBJECTIVE     VITAL SIGNS (Most Recent)  Temp: 98.5 °F (36.9 °C) (11/10/23 1632)  Pulse: 97 (11/10/23 1632)  Resp: 20 (11/10/23 1818)  BP: (!) 151/73 (11/10/23 1632)  SpO2: 97 % (11/10/23 1632)    VENTILATION STATUS  Resp: 20 (11/10/23 1818)  SpO2: 97 % (11/10/23 1632)           I & O (Last 24H):  Intake/Output Summary (Last 24 hours) at 11/10/2023 1854  Last data filed at 11/10/2023 1727  Gross per 24 hour   Intake 780 ml   Output --   Net 780 ml       WEIGHTS  Wt Readings from Last 3 Encounters:   11/04/23 2119 102.3 kg (225 lb 8.5 oz)   11/04/23 1657 99.8 kg (220 lb)   05/24/23 0833 104.3 kg (230 lb)   05/17/23 0849 104.3 kg (230 lb)       Physical Exam    On physical exam she is alert orient x3.  Cranial nerves 2-12 are intact.  She is nasal cannula oxygen on no apparent distress no respiratory distress no accessory muscle  "usage.  She is regular rate and rhythm no rubs gallops or murmurs clear to auscultation bilaterally soft obese abdomen nontender nondistended no clubbing cyanosis or edema bilateral lower extremities.    SCHEDULED MEDS:   apixaban  2.5 mg Oral BID    ezetimibe  10 mg Oral QHS    fluticasone propionate  1 spray Each Nostril BID    levothyroxine  50 mcg Oral Before breakfast    meropenem (MERREM) IVPB  1 g Intravenous Q12H    metoprolol succinate  25 mg Oral BID    pantoprazole  40 mg Oral Daily    sodium chloride 0.9%  10 mL Intravenous Q6H    venlafaxine  75 mg Oral Daily    vitamin D  2,000 Units Oral Daily       CONTINUOUS INFUSIONS:    PRN MEDS:benzonatate, dextrose 10%, dextrose 10%, glucose, glucose, HYDROcodone-acetaminophen, insulin aspart U-100, sodium chloride 0.9%, Flushing PICC/Midline Protocol **AND** sodium chloride 0.9% **AND** sodium chloride 0.9%    LABS AND DIAGNOSTICS     CBC LAST 3 DAYS  Recent Labs   Lab 11/07/23  0346 11/08/23 0347 11/09/23  0359   WBC 6.01 8.80 7.09   RBC 3.87* 4.29 4.16*   HGB 10.2* 11.1* 10.7*   HCT 33.4* 37.2 35.7*   MCV 86.3 86.7 85.8   MCH 26.4* 25.9* 25.7*   MCHC 30.5* 29.8* 30.0*   RDW 15.9 16.0 16.0    188 226   MPV 11.3* 11.1* 10.2       COAGULATION LAST 3 DAYS  No results for input(s): "LABPT", "INR", "APTT" in the last 168 hours.    CHEMISTRY LAST 3 DAYS  Recent Labs   Lab 11/05/23  2352 11/06/23  0411 11/08/23  0347 11/09/23  0359 11/10/23  0353   NA  --    < > 134* 137 137   K  --    < > 4.2 4.1 4.1   CO2  --    < > 26 25 26   BUN  --    < > 24.0* 22.0* 17.0   CREATININE  --    < > 1.55* 1.16* 1.33*   CALCIUM  --    < > 9.3 9.3 9.4   PH 7.400  --   --   --   --    MG  --    < > 2.10 2.00 2.00   ALBUMIN  --    < > 2.6* 2.4* 2.6*   ALKPHOS  --    < > 162* 147 176*   ALT  --    < > 54 39 35   AST  --    < > 62* 36* 29   BILITOT  --    < > 1.8* 1.6* 1.3    < > = values in this interval not displayed.       CARDIAC PROFILE LAST 3 DAYS  Recent Labs   Lab " "11/04/23  1706 11/05/23  1601   * 286*       ENDOCRINE LAST 3 DAYS  No results for input(s): "TSH", "PROCAL" in the last 168 hours.    LAST ARTERIAL BLOOD GAS  ABG  Recent Labs   Lab 11/05/23  2352   PH 7.400   PO2 85   PCO2 39.4   HCO3 24.4   BE 0       LAST 7 DAYS MICROBIOLOGY   Microbiology Results (last 7 days)       Procedure Component Value Units Date/Time    Blood culture #2 **CANNOT BE ORDERED STAT** [[id number]]  (Normal) Collected: 11/04/23 1720    Order Status: Completed Specimen: Blood Updated: 11/10/23 1701     CULTURE, BLOOD ([facility]) No Growth at 5 days    Blood culture #1 **CANNOT BE ORDERED STAT** [[id number]]  (Abnormal)  (Susceptibility) Collected: 11/04/23 1722    Order Status: Completed Specimen: Blood Updated: 11/08/23 0652     CULTURE, BLOOD ([facility]) Staphylococcus hominis     GRAM STAIN Gram Positive Cocci, probable Staphylococcus      Seen in gram stain of broth only      1 of 2 Aerobic bottles positive    Urine culture [[id number]]  (Abnormal)  (Susceptibility) Collected: 11/04/23 1739    Order Status: Completed Specimen: Urine Updated: 11/07/23 1015     Urine Culture >/= 100,000 colonies/ml Escherichia coli ESBL    BCID2 Panel [[id number]]  (Abnormal) Collected: 11/04/23 1722    Order Status: Completed Specimen: Blood Updated: 11/06/23 1441     CTX-M (ESBL ) N/A     IMP (Cabapenemase ) N/A     KPC resistance gene (Carbapenemase ) N/A     mcr-1 N/A     mecA ID N/A     Comment: Note: Antimicrobial resistance can occur via multiple mechanisms. A Not Detected result for antimicrobial resistance gene(s) does not indicate antimicrobial susceptibility. Subculturing is required for species identification and susceptibility testing of   isolates.        mecA/C and MREJ (MRSA) gene N/A     NDM (Carbapenemase ) N/A     OXA-48-like (Carbapenemase ) N/A     Candis/B (VRE gene) N/A     VIM (Carbapenemase ) N/A     Enterococcus faecalis Not Detected     " Enterococcus faecium Not Detected     Listeria monocytogenes Not Detected     Staphylococcus spp. Detected     Staphylococcus aureus Not Detected     Staphylococcus epidermidis Not Detected     Staphylococcus lugdunensis Not Detected     Streptococcus spp. Not Detected     Streptococcus agalactiae (Group B) Not Detected     Streptococcus pneumoniae Not Detected     Streptococcus pyogenes (Group A) Not Detected     Acinetobacter calcoaceticus/baumannii complex Not Detected     Bacteroides fragilis Not Detected     Enterobacterales Not Detected     Enterobacter cloacae complex Not Detected     Escherichia coli Not Detected     Klebsiella aerogenes Not Detected     Klebsiella oxytoca Not Detected     Klebsiella pneumoniae group Not Detected     Proteus spp. Not Detected     Salmonella spp. Not Detected     Serratia marcescens Not Detected     Haemophilus influenzae Not Detected     Neisseria meningitidis Not Detected     Pseudomonas aeruginosa Not Detected     Stenotrophomonas maltophilia Not Detected     Candida albicans Not Detected     Candida auris Not Detected     Candida glabrata Not Detected     Candida krusei Not Detected     Candida parapsilosis Not Detected     Candida tropicalis Not Detected     Cryptococcus neoformans/gattii Not Detected    Narrative:      The [employer] BCID2 Panel is a multiplexed nucleic acid test intended for the use with Etubics® 2.0 or Etubics® RF nano Systems for the simultaneous qualitative detection and identification of multiple bacterial and yeast nucleic acids and select genetic determinants associated with antimicrobial resistance.  The [employer] BCID2 Panel test is performed directly on blood culture samples identified as positive by a continuous monitoring blood culture system.  Results are intended to be interpreted in conjunction with Gram stain results.            MOST RECENT IMAGING  X-Ray Chest AP Portable  Narrative: EXAMINATION:  XR CHEST AP  PORTABLE    CLINICAL HISTORY:  cough;, .    COMPARISON:  11/04/2023    FINDINGS:  An AP view or more reveals the heart to be mildly enlarged.  The trachea is midline.  Right central line/MediPort is unchanged.  No definite infiltrate or effusion is seen.  There is mild elevation of the right hemidiaphragm.  Atherosclerosis seen within the aorta.  Post sternotomy changes are present.  Impression: 1. Mild cardiomegaly  2. Right central line/MediPort  3. Mildly elevated right hemidiaphragm  4. Atherosclerosis    Electronically signed by: Ilia Shay  Date:    11/07/2023  Time:    14:49  X-Ray Abdomen AP 1 View  Narrative: EXAMINATION:  XR ABDOMEN AP 1 VIEW    CLINICAL HISTORY:  bloated;, .    COMPARISON:  None available    FINDINGS:  Single AP or more views reveal a nonspecific bowel gas pattern without evidence of obstruction. Gas and feces are seen within the colon.  Postsurgical changes are noted to the right upper abdomen and lumbosacral spine as well as the pelvis.  Impression: 1. No diagnostic acute abdominal abnormality identified.    Electronically signed by: Ilia Shay  Date:    11/07/2023  Time:    14:48      LASTECHO  No results found for this or any previous visit.      CURRENT/PREVIOUS VISIT EKG  No results found for this or any previous visit.    ASSESSMENT/PLAN:     Active Hospital Problems    Diagnosis    *Acute cystitis without hematuria    Urinary tract infection due to extended-spectrum beta lactamase (ESBL) producing Escherichia coli    Hypoglycemia    DM (diabetes mellitus)    DORCAS (acute kidney injury)    Encephalopathy, metabolic       ASSESSMENT & PLAN:   Acute cystitis improving.  UTI significant for ESBL organism patient doing well on meropenem despite allergy history risk to benefit there.  No rashes as of yet.    Continue to monitor her sugars from a diabetes standpoint on sliding scale insulin low dose.  Will titrate up oral meds over the weekend if she continues to be stable.    Acute  kidney injury doing okay.  Close to baseline.      Metabolic encephalopathy resolved.    Physical debility and weakness.  We will continue physical therapy.      RECOMMENDATIONS:  DVT prophylaxis apixaban.  GI prophylaxis pantoprazole        Chuck Lucas III, MD  Department of Hospital Medicine ([facility])   Date of Service: 11/10/2023  6:54 PM

## 2023-11-12 LAB
POCT GLUCOSE: 194 MG/DL (ref 70–110)
POCT GLUCOSE: 197 MG/DL (ref 70–110)

## 2023-11-12 PROCEDURE — 63600175 PHARM REV CODE 636 W HCPCS: Performed by: INTERNAL MEDICINE

## 2023-11-12 PROCEDURE — 25000003 PHARM REV CODE 250: Performed by: INTERNAL MEDICINE

## 2023-11-12 PROCEDURE — 94761 N-INVAS EAR/PLS OXIMETRY MLT: CPT

## 2023-11-12 PROCEDURE — 11000001 HC ACUTE MED/SURG PRIVATE ROOM

## 2023-11-12 PROCEDURE — 25000003 PHARM REV CODE 250: Performed by: FAMILY MEDICINE

## 2023-11-12 PROCEDURE — 21400001 HC TELEMETRY ROOM

## 2023-11-12 PROCEDURE — 27000207 HC ISOLATION

## 2023-11-12 PROCEDURE — A4216 STERILE WATER/SALINE, 10 ML: HCPCS | Performed by: FAMILY MEDICINE

## 2023-11-12 RX ORDER — BUSPIRONE HYDROCHLORIDE 5 MG/1
5 TABLET ORAL EVERY 8 HOURS PRN
Status: DISCONTINUED | OUTPATIENT
Start: 2023-11-12 | End: 2023-11-15 | Stop reason: HOSPADM

## 2023-11-12 RX ADMIN — PANTOPRAZOLE SODIUM 40 MG: 40 TABLET, DELAYED RELEASE ORAL at 10:11

## 2023-11-12 RX ADMIN — MEROPENEM 1 G: 1 INJECTION, POWDER, FOR SOLUTION INTRAVENOUS at 10:11

## 2023-11-12 RX ADMIN — BUSPIRONE HYDROCHLORIDE 5 MG: 5 TABLET ORAL at 08:11

## 2023-11-12 RX ADMIN — MEROPENEM 1 G: 1 INJECTION, POWDER, FOR SOLUTION INTRAVENOUS at 08:11

## 2023-11-12 RX ADMIN — LEVOTHYROXINE SODIUM 50 MCG: 50 TABLET ORAL at 05:11

## 2023-11-12 RX ADMIN — EZETIMIBE 10 MG: 10 TABLET ORAL at 08:11

## 2023-11-12 RX ADMIN — SODIUM CHLORIDE, PRESERVATIVE FREE 10 ML: 5 INJECTION INTRAVENOUS at 01:11

## 2023-11-12 RX ADMIN — METOPROLOL SUCCINATE ER TABLETS 25 MG: 25 TABLET, FILM COATED, EXTENDED RELEASE ORAL at 09:11

## 2023-11-12 RX ADMIN — HYDROCODONE BITARTRATE AND ACETAMINOPHEN 1 TABLET: 5; 325 TABLET ORAL at 11:11

## 2023-11-12 RX ADMIN — SODIUM CHLORIDE, PRESERVATIVE FREE 10 ML: 5 INJECTION INTRAVENOUS at 06:11

## 2023-11-12 RX ADMIN — HYDROCODONE BITARTRATE AND ACETAMINOPHEN 1 TABLET: 5; 325 TABLET ORAL at 07:11

## 2023-11-12 RX ADMIN — APIXABAN 2.5 MG: 2.5 TABLET, FILM COATED ORAL at 10:11

## 2023-11-12 RX ADMIN — BUSPIRONE HYDROCHLORIDE 5 MG: 5 TABLET ORAL at 12:11

## 2023-11-12 RX ADMIN — APIXABAN 2.5 MG: 2.5 TABLET, FILM COATED ORAL at 08:11

## 2023-11-12 RX ADMIN — CHOLECALCIFEROL TAB 25 MCG (1000 UNIT) 2000 UNITS: 25 TAB at 10:11

## 2023-11-12 RX ADMIN — METOPROLOL SUCCINATE ER TABLETS 25 MG: 25 TABLET, FILM COATED, EXTENDED RELEASE ORAL at 10:11

## 2023-11-12 RX ADMIN — SITAGLIPTIN 50 MG: 50 TABLET, FILM COATED ORAL at 10:11

## 2023-11-12 RX ADMIN — VENLAFAXINE HYDROCHLORIDE 75 MG: 37.5 CAPSULE, EXTENDED RELEASE ORAL at 10:11

## 2023-11-12 NOTE — PROGRESS NOTES
[facility]  [address] [place] [address]  Phone: [phone]    (Hospital) Internal Medicine  Progress Note      PATIENT NAME: Kaila Hammond  MRN: [id number]  TODAY'S DATE: 11/11/2023  ADMIT DATE: 11/4/2023    SUBJECTIVE     PRINCIPLE PROBLEM: Acute cystitis without hematuria    INTERVAL HISTORY:    11/11/2023  Patient was tolerating meals.  She is still on oxygen.  Appetite is okay.  She states she is feeling having more energy.  She is day 2 of antibiotics that are appropriate for ESBL.  She is tolerating the carbapenem without any complications    Review of patient's allergies indicates:   Allergen Reactions    Augmentin [amoxicillin-pot clavulanate]     Cefaclor     Cephalexin     Penicillins     Sulfa (sulfonamide antibiotics)     Sulfamethoxazole-trimethoprim Hives       ROS mild shortness of breath, mild weakness.    OBJECTIVE     VITAL SIGNS (Most Recent)  Temp: 97.6 °F (36.4 °C) (11/11/23 1935)  Pulse: 80 (11/11/23 2048)  Resp: 20 (11/11/23 2048)  BP: (!) 143/88 (11/11/23 1935)  SpO2: 95 % (11/11/23 2048)    VENTILATION STATUS  Resp: 20 (11/11/23 2048)  SpO2: 95 % (11/11/23 2048)           I & O (Last 24H):  Intake/Output Summary (Last 24 hours) at 11/11/2023 2305  Last data filed at 11/11/2023 1200  Gross per 24 hour   Intake 400 ml   Output --   Net 400 ml       WEIGHTS  Wt Readings from Last 3 Encounters:   11/04/23 2119 102.3 kg (225 lb 8.5 oz)   11/04/23 1657 99.8 kg (220 lb)   05/24/23 0833 104.3 kg (230 lb)   05/17/23 0849 104.3 kg (230 lb)       Physical Exam    On physical exam she is alert and oriented x3.  Her nieces with the present at the bedside cranial nerves 2-12 intact no JVD.  Regular rate and rhythm no rubs gallops murmurs clear to auscultation bilaterally soft nontender nondistended abdomen no clubbing cyanosis or edema bilateral lower extremities.    SCHEDULED MEDS:   apixaban  2.5 mg Oral BID    ezetimibe  10 mg Oral QHS    fluticasone  "propionate  1 spray Each Nostril BID    levothyroxine  50 mcg Oral Before breakfast    meropenem (MERREM) IVPB  1 g Intravenous Q12H    metoprolol succinate  25 mg Oral BID    pantoprazole  40 mg Oral Daily    SITagliptin phosphate  50 mg Oral Daily    sodium chloride 0.9%  10 mL Intravenous Q6H    venlafaxine  75 mg Oral Daily    vitamin D  2,000 Units Oral Daily       CONTINUOUS INFUSIONS:    PRN MEDS:benzonatate, dextrose 10%, dextrose 10%, glucose, glucose, HYDROcodone-acetaminophen, insulin aspart U-100, sodium chloride 0.9%, Flushing PICC/Midline Protocol **AND** sodium chloride 0.9% **AND** sodium chloride 0.9%    LABS AND DIAGNOSTICS     CBC LAST 3 DAYS  Recent Labs   Lab 11/08/23  0347 11/09/23  0359 11/11/23  0648   WBC 8.80 7.09 8.49   RBC 4.29 4.16* 4.26   HGB 11.1* 10.7* 11.0*   HCT 37.2 35.7* 37.1   MCV 86.7 85.8 87.1   MCH 25.9* 25.7* 25.8*   MCHC 29.8* 30.0* 29.6*   RDW 16.0 16.0 16.3    226 322   MPV 11.1* 10.2 9.4       COAGULATION LAST 3 DAYS  No results for input(s): "LABPT", "INR", "APTT" in the last 168 hours.    CHEMISTRY LAST 3 DAYS  Recent Labs   Lab 11/05/23  2352 11/06/23  0411 11/09/23  0359 11/10/23  0353 11/11/23  0648   NA  --    < > 137 137 139   K  --    < > 4.1 4.1 3.8   CO2  --    < > 25 26 26   BUN  --    < > 22.0* 17.0 13.0   CREATININE  --    < > 1.16* 1.33* 1.30*   CALCIUM  --    < > 9.3 9.4 9.1   PH 7.400  --   --   --   --    MG  --    < > 2.00 2.00 1.90   ALBUMIN  --    < > 2.4* 2.6* 2.7*   ALKPHOS  --    < > 147 176* 159*   ALT  --    < > 39 35 29   AST  --    < > 36* 29 30   BILITOT  --    < > 1.6* 1.3 1.5    < > = values in this interval not displayed.       CARDIAC PROFILE LAST 3 DAYS  Recent Labs   Lab 11/05/23  1601   *       ENDOCRINE LAST 3 DAYS  No results for input(s): "TSH", "PROCAL" in the last 168 hours.    LAST ARTERIAL BLOOD GAS  ABG  Recent Labs   Lab 11/05/23  2352   PH 7.400   PO2 85   PCO2 39.4   HCO3 24.4   BE 0       LAST 7 DAYS " MICROBIOLOGY   Microbiology Results (last 7 days)       Procedure Component Value Units Date/Time    Blood culture #2 **CANNOT BE ORDERED STAT** [[id number]]  (Normal) Collected: 11/04/23 1720    Order Status: Completed Specimen: Blood Updated: 11/10/23 1701     CULTURE, BLOOD ([facility]) No Growth at 5 days    Blood culture #1 **CANNOT BE ORDERED STAT** [[id number]]  (Abnormal)  (Susceptibility) Collected: 11/04/23 1722    Order Status: Completed Specimen: Blood Updated: 11/08/23 0652     CULTURE, BLOOD ([facility]) Staphylococcus hominis     GRAM STAIN Gram Positive Cocci, probable Staphylococcus      Seen in gram stain of broth only      1 of 2 Aerobic bottles positive    Urine culture [[id number]]  (Abnormal)  (Susceptibility) Collected: 11/04/23 1739    Order Status: Completed Specimen: Urine Updated: 11/07/23 1015     Urine Culture >/= 100,000 colonies/ml Escherichia coli ESBL    BCID2 Panel [[id number]]  (Abnormal) Collected: 11/04/23 1722    Order Status: Completed Specimen: Blood Updated: 11/06/23 1441     CTX-M (ESBL ) N/A     IMP (Cabapenemase ) N/A     KPC resistance gene (Carbapenemase ) N/A     mcr-1 N/A     mecA ID N/A     Comment: Note: Antimicrobial resistance can occur via multiple mechanisms. A Not Detected result for antimicrobial resistance gene(s) does not indicate antimicrobial susceptibility. Subculturing is required for species identification and susceptibility testing of   isolates.        mecA/C and MREJ (MRSA) gene N/A     NDM (Carbapenemase ) N/A     OXA-48-like (Carbapenemase ) N/A     Candis/B (VRE gene) N/A     VIM (Carbapenemase ) N/A     Enterococcus faecalis Not Detected     Enterococcus faecium Not Detected     Listeria monocytogenes Not Detected     Staphylococcus spp. Detected     Staphylococcus aureus Not Detected     Staphylococcus epidermidis Not Detected     Staphylococcus lugdunensis Not Detected     Streptococcus spp. Not Detected      Streptococcus agalactiae (Group B) Not Detected     Streptococcus pneumoniae Not Detected     Streptococcus pyogenes (Group A) Not Detected     Acinetobacter calcoaceticus/baumannii complex Not Detected     Bacteroides fragilis Not Detected     Enterobacterales Not Detected     Enterobacter cloacae complex Not Detected     Escherichia coli Not Detected     Klebsiella aerogenes Not Detected     Klebsiella oxytoca Not Detected     Klebsiella pneumoniae group Not Detected     Proteus spp. Not Detected     Salmonella spp. Not Detected     Serratia marcescens Not Detected     Haemophilus influenzae Not Detected     Neisseria meningitidis Not Detected     Pseudomonas aeruginosa Not Detected     Stenotrophomonas maltophilia Not Detected     Candida albicans Not Detected     Candida auris Not Detected     Candida glabrata Not Detected     Candida krusei Not Detected     Candida parapsilosis Not Detected     Candida tropicalis Not Detected     Cryptococcus neoformans/gattii Not Detected    Narrative:      The [employer] BCID2 Panel is a multiplexed nucleic acid test intended for the use with [employer]® [employer].0 or [employer]® Mult[employer] Plus | [employer] for the simultaneous qualitative detection and identification of multiple bacterial and yeast nucleic acids and select genetic determinants associated with antimicrobial resistance.  The [employer] BCID2 Panel test is performed directly on blood culture samples identified as positive by a continuous monitoring blood culture system.  Results are intended to be interpreted in conjunction with Gram stain results.            MOST RECENT IMAGING  X-Ray Chest AP Portable  Narrative: EXAMINATION:  XR CHEST AP PORTABLE    CLINICAL HISTORY:  cough;, .    COMPARISON:  11/04/2023    FINDINGS:  An AP view or more reveals the heart to be mildly enlarged.  The trachea is midline.  Right central line/MediPort is unchanged.  No definite infiltrate or effusion is seen.  There is mild elevation of the  right hemidiaphragm.  Atherosclerosis seen within the aorta.  Post sternotomy changes are present.  Impression: 1. Mild cardiomegaly  2. Right central line/MediPort  3. Mildly elevated right hemidiaphragm  4. Atherosclerosis    Electronically signed by: Ilia Shay  Date:    11/07/2023  Time:    14:49  X-Ray Abdomen AP 1 View  Narrative: EXAMINATION:  XR ABDOMEN AP 1 VIEW    CLINICAL HISTORY:  bloated;, .    COMPARISON:  None available    FINDINGS:  Single AP or more views reveal a nonspecific bowel gas pattern without evidence of obstruction. Gas and feces are seen within the colon.  Postsurgical changes are noted to the right upper abdomen and lumbosacral spine as well as the pelvis.  Impression: 1. No diagnostic acute abdominal abnormality identified.    Electronically signed by: Ilia Shay  Date:    11/07/2023  Time:    14:48      LASTECHO  No results found for this or any previous visit.      CURRENT/PREVIOUS VISIT EKG  No results found for this or any previous visit.    ASSESSMENT/PLAN:     Active Hospital Problems    Diagnosis    *Acute cystitis without hematuria    Hypoxemia    Weakness    Urinary tract infection due to extended-spectrum beta lactamase (ESBL) producing Escherichia coli    Hypoglycemia    DM (diabetes mellitus)    DORCAS (acute kidney injury)    Encephalopathy, metabolic       ASSESSMENT & PLAN:   Continue antibiotics.  Due to of 5 4 goal directed therapy against ESBL.  Tolerating carbapenems fine without complications.      Etiology of hypoxemia is more likely volume overload.  We are trying to letter auto diurese.  If not will get chest x-ray and give some diuretics in the morning.      Continue physical therapy.    Continue her sliding scale insulin I have added Januvia low dose.      Continue to monitor renal function.      Her encephalopathy has resolved.      RECOMMENDATIONS:  GI prophylaxis pantoprazole DVT prophylaxis with apixaban        Chuck Lucas III, MD  Department of  Hospital Medicine ([facility])   Date of Service: 11/11/2023  11:02 PM

## 2023-11-13 PROBLEM — I10 HYPERTENSION: Status: ACTIVE | Noted: 2023-11-13

## 2023-11-13 LAB
POCT GLUCOSE: 171 MG/DL (ref 70–110)
POCT GLUCOSE: 188 MG/DL (ref 70–110)
POCT GLUCOSE: 192 MG/DL (ref 70–110)
POCT GLUCOSE: 202 MG/DL (ref 70–110)

## 2023-11-13 PROCEDURE — 99900035 HC TECH TIME PER 15 MIN (STAT)

## 2023-11-13 PROCEDURE — 25000003 PHARM REV CODE 250: Performed by: FAMILY MEDICINE

## 2023-11-13 PROCEDURE — 25000003 PHARM REV CODE 250: Performed by: INTERNAL MEDICINE

## 2023-11-13 PROCEDURE — A4216 STERILE WATER/SALINE, 10 ML: HCPCS | Performed by: FAMILY MEDICINE

## 2023-11-13 PROCEDURE — 21400001 HC TELEMETRY ROOM

## 2023-11-13 PROCEDURE — 63600175 PHARM REV CODE 636 W HCPCS: Performed by: INTERNAL MEDICINE

## 2023-11-13 PROCEDURE — 94761 N-INVAS EAR/PLS OXIMETRY MLT: CPT

## 2023-11-13 PROCEDURE — 27000207 HC ISOLATION

## 2023-11-13 RX ORDER — TORSEMIDE 10 MG/1
10 TABLET ORAL DAILY
Status: DISCONTINUED | OUTPATIENT
Start: 2023-11-13 | End: 2023-11-13

## 2023-11-13 RX ORDER — TORSEMIDE 10 MG/1
20 TABLET ORAL DAILY
Status: DISCONTINUED | OUTPATIENT
Start: 2023-11-13 | End: 2023-11-15 | Stop reason: HOSPADM

## 2023-11-13 RX ADMIN — METOPROLOL SUCCINATE ER TABLETS 25 MG: 25 TABLET, FILM COATED, EXTENDED RELEASE ORAL at 08:11

## 2023-11-13 RX ADMIN — APIXABAN 2.5 MG: 2.5 TABLET, FILM COATED ORAL at 09:11

## 2023-11-13 RX ADMIN — MEROPENEM 1 G: 1 INJECTION, POWDER, FOR SOLUTION INTRAVENOUS at 08:11

## 2023-11-13 RX ADMIN — HYDROCODONE BITARTRATE AND ACETAMINOPHEN 1 TABLET: 5; 325 TABLET ORAL at 08:11

## 2023-11-13 RX ADMIN — VENLAFAXINE HYDROCHLORIDE 75 MG: 37.5 CAPSULE, EXTENDED RELEASE ORAL at 09:11

## 2023-11-13 RX ADMIN — TORSEMIDE 20 MG: 10 TABLET ORAL at 02:11

## 2023-11-13 RX ADMIN — EZETIMIBE 10 MG: 10 TABLET ORAL at 08:11

## 2023-11-13 RX ADMIN — SODIUM CHLORIDE, PRESERVATIVE FREE 10 ML: 5 INJECTION INTRAVENOUS at 12:11

## 2023-11-13 RX ADMIN — METOPROLOL SUCCINATE ER TABLETS 25 MG: 25 TABLET, FILM COATED, EXTENDED RELEASE ORAL at 09:11

## 2023-11-13 RX ADMIN — CHOLECALCIFEROL TAB 25 MCG (1000 UNIT) 2000 UNITS: 25 TAB at 09:11

## 2023-11-13 RX ADMIN — SITAGLIPTIN 50 MG: 50 TABLET, FILM COATED ORAL at 09:11

## 2023-11-13 RX ADMIN — SODIUM CHLORIDE, PRESERVATIVE FREE 10 ML: 5 INJECTION INTRAVENOUS at 06:11

## 2023-11-13 RX ADMIN — MEROPENEM 1 G: 1 INJECTION, POWDER, FOR SOLUTION INTRAVENOUS at 09:11

## 2023-11-13 RX ADMIN — LEVOTHYROXINE SODIUM 50 MCG: 50 TABLET ORAL at 06:11

## 2023-11-13 RX ADMIN — SODIUM CHLORIDE, PRESERVATIVE FREE 10 ML: 5 INJECTION INTRAVENOUS at 05:11

## 2023-11-13 RX ADMIN — PANTOPRAZOLE SODIUM 40 MG: 40 TABLET, DELAYED RELEASE ORAL at 09:11

## 2023-11-13 RX ADMIN — APIXABAN 2.5 MG: 2.5 TABLET, FILM COATED ORAL at 08:11

## 2023-11-13 RX ADMIN — INSULIN ASPART 2 UNITS: 100 INJECTION, SOLUTION INTRAVENOUS; SUBCUTANEOUS at 05:11

## 2023-11-13 NOTE — PT/OT/SLP PROGRESS
Physical Therapy      Patient Name:  Kaila Hammond   MRN:  [id number]    Patient not seen today secondary to patient refusal of service.  Pt reports she just walked with nurse to bathroom and has been doing so all day and since she is on isolation she will just stay in her room and skip therapy today and that she is going home tomorrow . Will follow-up tomorrow.

## 2023-11-13 NOTE — PROGRESS NOTES
Inpatient Nutrition Evaluation    Admit Date: 11/4/2023   Total duration of encounter: 9 days    Nutrition Recommendation/Prescription     Continue Diabetic Diet as tolerated.   Boost Glucose Control, TID, to assist with intake. Provides 190 kcal, 16 g protein per serving.  Monitor intake, weight, and labs.     RD following and available as needed. Thank you.     Nutrition Assessment     Chart Review    Reason Seen: continuous nutrition monitoring and follow-up    Malnutrition Screening Tool Results   Have you recently lost weight without trying?: No  Have you been eating poorly because of a decreased appetite?: No   MST Score: 0     Diagnosis:  Acute Cystitis without hematuria.     Relevant Medical History:   A-fib      Chronic kidney disease      CNS lymphoma      Depression      DM (diabetes mellitus)      Essential (primary) hypertension      Mixed hyperlipidemia      Obesity      Peripheral vascular disease      Personal history of colonic polyps      Polyneuropathy      Sleep apnea          Nutrition-Related Medications:   IV Fluids: D10% in water with sodium chloride hypertonic @ 100 mL/hr.   Vitamin D; Levothyroxine.       Nutrition-Related Labs:  11/6: H/H 10.0/32.6(L); Na+ 133(L); BUN/Crea 35.0/2.06(H);GFR 25(L); GLU 80(L); Alb 2.5(L); AST 90(H).    Diet Order: Diet diabetic  Oral Supplement Order: none  Appetite/Oral Intake: fair/25-50% of meals  Factors Affecting Nutritional Intake: none identified  Food/Congregation/Cultural Preferences: none reported  Food Allergies: none reported    Skin Integrity: bruised (ecchymotic)  Wound(s):       Comments    11/13: Pt with fair appetite today. Consumed 25% of breakfast so far today. Will add BGC to assist with intake and continue to monitor during stay. Noted ~ 5 # weight gain; however, may be secondary to different scales ICU vs Med Surg. Will continue to monitor during stay.     11/6: Noted low GLU. Nursing reports pt with good appetite and intake. No issues with  "meals and no need for ONS at this time per nursing. No recent weight loss noted/reported. Will continue to monitor during stay.     Anthropometrics    Height: 5' 7" (170.2 cm) Height Method: Stated  Last Weight: 102.3 kg (225 lb 8.5 oz) (23) Weight Method: Bed Scale  BMI (Calculated): 35.3  BMI Classification: obese grade II (BMI 35-39.9)     Ideal Body Weight (IBW), Female: 135 lb     % Ideal Body Weight, Female (lb): 167.06 %                    Usual Body Weight (UBW), k kg  % Usual Body Weight: 98.57     Usual Weight Provided By: EMR weight history    Wt Readings from Last 5 Encounters:   23 102.3 kg (225 lb 8.5 oz)   23 104.3 kg (230 lb)   23 104.3 kg (230 lb)   23 104.3 kg (230 lb)   23 104.3 kg (230 lb)     Weight Change(s) Since Admission:  Admit Weight: 99.8 kg (220 lb) (23 1657)  : 102.3 kg.    Patient Education    Not applicable.    Monitoring & Evaluation     Dietitian will monitor food and beverage intake, energy intake, weight, weight change, electrolyte/renal panel, glucose/endocrine profile, and gastrointestinal profile.  Nutrition Risk/Follow-Up: low (follow-up in 5-7 days)  Patients assigned 'low nutrition risk' status do not qualify for a full nutritional assessment but will be monitored and re-evaluated in a 5-7 day time period. Please consult if re-evaluation needed sooner.   "

## 2023-11-13 NOTE — PROGRESS NOTES
[facility]  [address] [place] [address]  Phone: [phone]    (Hospital) Internal Medicine  Progress Note      PATIENT NAME: Kaila Hammond  MRN: [id number]  TODAY'S DATE: 11/13/2023  ADMIT DATE: 11/4/2023    SUBJECTIVE     PRINCIPLE PROBLEM: Acute cystitis without hematuria    INTERVAL HISTORY:    11/13/2023  Patient admitted to some anxiety overnight.  She did not know whether she is having relations not.  Said it was better with abuse room.  She is having no reaction to the said drug allergy.    Review of patient's allergies indicates:   Allergen Reactions    Augmentin [amoxicillin-pot clavulanate]     Cefaclor     Cephalexin     Penicillins     Sulfa (sulfonamide antibiotics)     Sulfamethoxazole-trimethoprim Hives       ROS -14 point review of systems except as mentioned above.  OBJECTIVE     VITAL SIGNS (Most Recent)  Temp: 98 °F (36.7 °C) (11/12/23 2350)  Pulse: 73 (11/12/23 2350)  Resp: 18 (11/12/23 2350)  BP: (!) 142/85 (11/12/23 2350)  SpO2: (!) 94 % (11/12/23 2350)    VENTILATION STATUS  Resp: 18 (11/12/23 2350)  SpO2: (!) 94 % (11/12/23 2350)           I & O (Last 24H):No intake or output data in the 24 hours ending 11/13/23 0010    WEIGHTS  Wt Readings from Last 3 Encounters:   11/04/23 2119 102.3 kg (225 lb 8.5 oz)   11/04/23 1657 99.8 kg (220 lb)   05/24/23 0833 104.3 kg (230 lb)   05/17/23 0849 104.3 kg (230 lb)       Physical Exam    Patient is alert and oriented x3.  Cranial nerves 2-12 intact no JVD regular rate and rhythm no rubs gallops or murmurs clear to auscultation bilaterally soft nontender nondistended abdomen.  No clubbing cyanosis or edema bilateral lower extremities    SCHEDULED MEDS:   apixaban  2.5 mg Oral BID    ezetimibe  10 mg Oral QHS    fluticasone propionate  1 spray Each Nostril BID    levothyroxine  50 mcg Oral Before breakfast    meropenem (MERREM) IVPB  1 g Intravenous Q12H    metoprolol succinate  25 mg Oral BID    pantoprazole  40  "mg Oral Daily    SITagliptin phosphate  50 mg Oral Daily    sodium chloride 0.9%  10 mL Intravenous Q6H    venlafaxine  75 mg Oral Daily    vitamin D  2,000 Units Oral Daily       CONTINUOUS INFUSIONS:    PRN MEDS:benzonatate, busPIRone, dextrose 10%, dextrose 10%, glucose, glucose, HYDROcodone-acetaminophen, insulin aspart U-100, sodium chloride 0.9%, Flushing PICC/Midline Protocol **AND** sodium chloride 0.9% **AND** sodium chloride 0.9%    LABS AND DIAGNOSTICS     CBC LAST 3 DAYS  Recent Labs   Lab 11/08/23  0347 11/09/23  0359 11/11/23  0648   WBC 8.80 7.09 8.49   RBC 4.29 4.16* 4.26   HGB 11.1* 10.7* 11.0*   HCT 37.2 35.7* 37.1   MCV 86.7 85.8 87.1   MCH 25.9* 25.7* 25.8*   MCHC 29.8* 30.0* 29.6*   RDW 16.0 16.0 16.3    226 322   MPV 11.1* 10.2 9.4       COAGULATION LAST 3 DAYS  No results for input(s): "LABPT", "INR", "APTT" in the last 168 hours.    CHEMISTRY LAST 3 DAYS  Recent Labs   Lab 11/09/23  0359 11/10/23  0353 11/11/23  0648    137 139   K 4.1 4.1 3.8   CO2 25 26 26   BUN 22.0* 17.0 13.0   CREATININE 1.16* 1.33* 1.30*   CALCIUM 9.3 9.4 9.1   MG 2.00 2.00 1.90   ALBUMIN 2.4* 2.6* 2.7*   ALKPHOS 147 176* 159*   ALT 39 35 29   AST 36* 29 30   BILITOT 1.6* 1.3 1.5       CARDIAC PROFILE LAST 3 DAYS  No results for input(s): "BNP", "CPK", "CPKMB", "LDH", "TROPONINI" in the last 168 hours.    ENDOCRINE LAST 3 DAYS  No results for input(s): "TSH", "PROCAL" in the last 168 hours.    LAST ARTERIAL BLOOD GAS  ABG  No results for input(s): "PH", "PO2", "PCO2", "HCO3", "BE" in the last 168 hours.    LAST 7 DAYS MICROBIOLOGY   Microbiology Results (last 7 days)       Procedure Component Value Units Date/Time    Blood culture #2 **CANNOT BE ORDERED STAT** [[id number]]  (Normal) Collected: 11/04/23 1720    Order Status: Completed Specimen: Blood Updated: 11/10/23 1701     CULTURE, BLOOD ([facility]) No Growth at 5 days    Blood culture #1 **CANNOT BE ORDERED STAT** [[id number]]  (Abnormal)  (Susceptibility) " Collected: 11/04/23 1722    Order Status: Completed Specimen: Blood Updated: 11/08/23 0652     CULTURE, BLOOD ([facility]) Staphylococcus hominis     GRAM STAIN Gram Positive Cocci, probable Staphylococcus      Seen in gram stain of broth only      1 of 2 Aerobic bottles positive    Urine culture [[id number]]  (Abnormal)  (Susceptibility) Collected: 11/04/23 1739    Order Status: Completed Specimen: Urine Updated: 11/07/23 1015     Urine Culture >/= 100,000 colonies/ml Escherichia coli ESBL    BCID2 Panel [[id number]]  (Abnormal) Collected: 11/04/23 1722    Order Status: Completed Specimen: Blood Updated: 11/06/23 1441     CTX-M (ESBL ) N/A     IMP (Cabapenemase ) N/A     KPC resistance gene (Carbapenemase ) N/A     mcr-1 N/A     mecA ID N/A     Comment: Note: Antimicrobial resistance can occur via multiple mechanisms. A Not Detected result for antimicrobial resistance gene(s) does not indicate antimicrobial susceptibility. Subculturing is required for species identification and susceptibility testing of   isolates.        mecA/C and MREJ (MRSA) gene N/A     NDM (Carbapenemase ) N/A     OXA-48-like (Carbapenemase ) N/A     Candis/B (VRE gene) N/A     VIM (Carbapenemase ) N/A     Enterococcus faecalis Not Detected     Enterococcus faecium Not Detected     Listeria monocytogenes Not Detected     Staphylococcus spp. Detected     Staphylococcus aureus Not Detected     Staphylococcus epidermidis Not Detected     Staphylococcus lugdunensis Not Detected     Streptococcus spp. Not Detected     Streptococcus agalactiae (Group B) Not Detected     Streptococcus pneumoniae Not Detected     Streptococcus pyogenes (Group A) Not Detected     Acinetobacter calcoaceticus/baumannii complex Not Detected     Bacteroides fragilis Not Detected     Enterobacterales Not Detected     Enterobacter cloacae complex Not Detected     Escherichia coli Not Detected     Klebsiella aerogenes Not Detected      Klebsiella oxytoca Not Detected     Klebsiella pneumoniae group Not Detected     Proteus spp. Not Detected     Salmonella spp. Not Detected     Serratia marcescens Not Detected     Haemophilus influenzae Not Detected     Neisseria meningitidis Not Detected     Pseudomonas aeruginosa Not Detected     Stenotrophomonas maltophilia Not Detected     Candida albicans Not Detected     Candida auris Not Detected     Candida glabrata Not Detected     Candida krusei Not Detected     Candida parapsilosis Not Detected     Candida tropicalis Not Detected     Cryptococcus neoformans/gattii Not Detected    Narrative:      The [employer] BCID2 Panel is a multiplexed nucleic acid test intended for the use with [employer]® [employer].0 or [employer]® [employer] Systems for the simultaneous qualitative detection and identification of multiple bacterial and yeast nucleic acids and select genetic determinants associated with antimicrobial resistance.  The [employer] BCID2 Panel test is performed directly on blood culture samples identified as positive by a continuous monitoring blood culture system.  Results are intended to be interpreted in conjunction with Gram stain results.            MOST RECENT IMAGING  X-Ray Chest AP Portable  Narrative: EXAMINATION:  XR CHEST AP PORTABLE    CLINICAL HISTORY:  cough;, .    COMPARISON:  11/04/2023    FINDINGS:  An AP view or more reveals the heart to be mildly enlarged.  The trachea is midline.  Right central line/MediPort is unchanged.  No definite infiltrate or effusion is seen.  There is mild elevation of the right hemidiaphragm.  Atherosclerosis seen within the aorta.  Post sternotomy changes are present.  Impression: 1. Mild cardiomegaly  2. Right central line/MediPort  3. Mildly elevated right hemidiaphragm  4. Atherosclerosis    Electronically signed by: Ilia Shay  Date:    11/07/2023  Time:    14:49  X-Ray Abdomen AP 1 View  Narrative: EXAMINATION:  XR ABDOMEN AP 1 VIEW    CLINICAL  HISTORY:  bloated;, .    COMPARISON:  None available    FINDINGS:  Single AP or more views reveal a nonspecific bowel gas pattern without evidence of obstruction. Gas and feces are seen within the colon.  Postsurgical changes are noted to the right upper abdomen and lumbosacral spine as well as the pelvis.  Impression: 1. No diagnostic acute abdominal abnormality identified.    Electronically signed by: Ilia Shay  Date:    11/07/2023  Time:    14:48      LASTECHO  No results found for this or any previous visit.      CURRENT/PREVIOUS VISIT EKG  No results found for this or any previous visit.    ASSESSMENT/PLAN:     Active Hospital Problems    Diagnosis    *Acute cystitis without hematuria    Hypoxemia    Weakness    Urinary tract infection due to extended-spectrum beta lactamase (ESBL) producing Escherichia coli    Hypoglycemia    DM (diabetes mellitus)    DORCAS (acute kidney injury)    Encephalopathy, metabolic       ASSESSMENT & PLAN:   Continue antibiotics.  She is day 3 of 5.    Continue current diabetic regimen.      Gentle fluids.      Buspirone for p.r.n. anxiety.      RECOMMENDATIONS:  Apixaban for DVT prophylaxis.  GI prophylaxis pantoprazole        Chuck Lucas III, MD  Department of Hospital Medicine ([facility])   Date of Service: 11/13/2023  12:10 AM

## 2023-11-13 NOTE — PROGRESS NOTES
[facility]  [address] [place] [address]  Phone: [phone]    (Hospital) Internal Medicine  Progress Note      PATIENT NAME: Kaila Hammond  MRN: [id number]  TODAY'S DATE: 11/13/2023  ADMIT DATE: 11/4/2023    SUBJECTIVE     PRINCIPLE PROBLEM: Acute cystitis without hematuria    INTERVAL HISTORY:    11/13/2023  Patient states that she is a little short of breath.  She is on nasal cannula oxygen.  She is also having some possible panic attacks when she is short of breath.  Supplemental nasal cannula oxygen was administered.  She is also using buspirone which she says it little bit better.  She is also complaining of some decreased appetite.  Review of patient's allergies indicates:   Allergen Reactions    Augmentin [amoxicillin-pot clavulanate]     Cefaclor     Cephalexin     Penicillins     Sulfa (sulfonamide antibiotics)     Sulfamethoxazole-trimethoprim Hives       ROS review of systems are negative except as mentioned above.    OBJECTIVE     VITAL SIGNS (Most Recent)  Temp: 97.8 °F (36.6 °C) (11/13/23 1240)  Pulse: 75 (11/13/23 1240)  Resp: 20 (11/13/23 1240)  BP: (!) 153/91 (11/13/23 1240)  SpO2: 100 % (11/13/23 1240)    VENTILATION STATUS  Resp: 20 (11/13/23 1240)  SpO2: 100 % (11/13/23 1240)           I & O (Last 24H):  Intake/Output Summary (Last 24 hours) at 11/13/2023 1308  Last data filed at 11/13/2023 0838  Gross per 24 hour   Intake 120 ml   Output 500 ml   Net -380 ml       WEIGHTS  Wt Readings from Last 3 Encounters:   11/04/23 2119 102.3 kg (225 lb 8.5 oz)   11/04/23 1657 99.8 kg (220 lb)   05/24/23 0833 104.3 kg (230 lb)   05/17/23 0849 104.3 kg (230 lb)       Physical Exam    Patient is alert and oriented x3.  Cranial nerves 2-12 are intact no JVD.  Regular rate and rhythm no rubs gallops or murmurs mild crackles bases bilaterally lungs.  Abdomen is soft obese nontender nondistended no clubbing cyanosis or edema bilateral lower extremities.    SCHEDULED  "MEDS:   apixaban  2.5 mg Oral BID    ezetimibe  10 mg Oral QHS    fluticasone propionate  1 spray Each Nostril BID    levothyroxine  50 mcg Oral Before breakfast    meropenem (MERREM) IVPB  1 g Intravenous Q12H    metoprolol succinate  25 mg Oral BID    pantoprazole  40 mg Oral Daily    SITagliptin phosphate  50 mg Oral Daily    sodium chloride 0.9%  10 mL Intravenous Q6H    torsemide  10 mg Oral Daily    venlafaxine  75 mg Oral Daily    vitamin D  2,000 Units Oral Daily       CONTINUOUS INFUSIONS:    PRN MEDS:benzonatate, busPIRone, dextrose 10%, dextrose 10%, glucose, glucose, HYDROcodone-acetaminophen, insulin aspart U-100, sodium chloride 0.9%, Flushing PICC/Midline Protocol **AND** sodium chloride 0.9% **AND** sodium chloride 0.9%    LABS AND DIAGNOSTICS     CBC LAST 3 DAYS  Recent Labs   Lab 11/08/23  0347 11/09/23  0359 11/11/23  0648   WBC 8.80 7.09 8.49   RBC 4.29 4.16* 4.26   HGB 11.1* 10.7* 11.0*   HCT 37.2 35.7* 37.1   MCV 86.7 85.8 87.1   MCH 25.9* 25.7* 25.8*   MCHC 29.8* 30.0* 29.6*   RDW 16.0 16.0 16.3    226 322   MPV 11.1* 10.2 9.4       COAGULATION LAST 3 DAYS  No results for input(s): "LABPT", "INR", "APTT" in the last 168 hours.    CHEMISTRY LAST 3 DAYS  Recent Labs   Lab 11/09/23  0359 11/10/23  0353 11/11/23  0648    137 139   K 4.1 4.1 3.8   CO2 25 26 26   BUN 22.0* 17.0 13.0   CREATININE 1.16* 1.33* 1.30*   CALCIUM 9.3 9.4 9.1   MG 2.00 2.00 1.90   ALBUMIN 2.4* 2.6* 2.7*   ALKPHOS 147 176* 159*   ALT 39 35 29   AST 36* 29 30   BILITOT 1.6* 1.3 1.5       CARDIAC PROFILE LAST 3 DAYS  No results for input(s): "BNP", "CPK", "CPKMB", "LDH", "TROPONINI" in the last 168 hours.    ENDOCRINE LAST 3 DAYS  No results for input(s): "TSH", "PROCAL" in the last 168 hours.    LAST ARTERIAL BLOOD GAS  ABG  No results for input(s): "PH", "PO2", "PCO2", "HCO3", "BE" in the last 168 hours.    LAST 7 DAYS MICROBIOLOGY   Microbiology Results (last 7 days)       Procedure Component Value Units " Date/Time    Blood culture #2 **CANNOT BE ORDERED STAT** [[id number]]  (Normal) Collected: 11/04/23 1720    Order Status: Completed Specimen: Blood Updated: 11/10/23 1701     CULTURE, BLOOD ([facility]) No Growth at 5 days    Blood culture #1 **CANNOT BE ORDERED STAT** [[id number]]  (Abnormal)  (Susceptibility) Collected: 11/04/23 1722    Order Status: Completed Specimen: Blood Updated: 11/08/23 0652     CULTURE, BLOOD ([facility]) Staphylococcus hominis     GRAM STAIN Gram Positive Cocci, probable Staphylococcus      Seen in gram stain of broth only      1 of 2 Aerobic bottles positive    Urine culture [[id number]]  (Abnormal)  (Susceptibility) Collected: 11/04/23 1739    Order Status: Completed Specimen: Urine Updated: 11/07/23 1015     Urine Culture >/= 100,000 colonies/ml Escherichia coli ESBL    BCID2 Panel [[id number]]  (Abnormal) Collected: 11/04/23 1722    Order Status: Completed Specimen: Blood Updated: 11/06/23 1441     CTX-M (ESBL ) N/A     IMP (Cabapenemase ) N/A     KPC resistance gene (Carbapenemase ) N/A     mcr-1 N/A     mecA ID N/A     Comment: Note: Antimicrobial resistance can occur via multiple mechanisms. A Not Detected result for antimicrobial resistance gene(s) does not indicate antimicrobial susceptibility. Subculturing is required for species identification and susceptibility testing of   isolates.        mecA/C and MREJ (MRSA) gene N/A     NDM (Carbapenemase ) N/A     OXA-48-like (Carbapenemase ) N/A     Candis/B (VRE gene) N/A     VIM (Carbapenemase ) N/A     Enterococcus faecalis Not Detected     Enterococcus faecium Not Detected     Listeria monocytogenes Not Detected     Staphylococcus spp. Detected     Staphylococcus aureus Not Detected     Staphylococcus epidermidis Not Detected     Staphylococcus lugdunensis Not Detected     Streptococcus spp. Not Detected     Streptococcus agalactiae (Group B) Not Detected     Streptococcus pneumoniae Not Detected      Streptococcus pyogenes (Group A) Not Detected     Acinetobacter calcoaceticus/baumannii complex Not Detected     Bacteroides fragilis Not Detected     Enterobacterales Not Detected     Enterobacter cloacae complex Not Detected     Escherichia coli Not Detected     Klebsiella aerogenes Not Detected     Klebsiella oxytoca Not Detected     Klebsiella pneumoniae group Not Detected     Proteus spp. Not Detected     Salmonella spp. Not Detected     Serratia marcescens Not Detected     Haemophilus influenzae Not Detected     Neisseria meningitidis Not Detected     Pseudomonas aeruginosa Not Detected     Stenotrophomonas maltophilia Not Detected     Candida albicans Not Detected     Candida auris Not Detected     Candida glabrata Not Detected     Candida krusei Not Detected     Candida parapsilosis Not Detected     Candida tropicalis Not Detected     Cryptococcus neoformans/gattii Not Detected    Narrative:      The [employer] BCID2 Panel is a multiplexed nucleic acid test intended for the use with [employer]® [employer].0 or [employer]® [employer] for the simultaneous qualitative detection and identification of multiple bacterial and yeast nucleic acids and select genetic determinants associated with antimicrobial resistance.  The [employer] BCID2 Panel test is performed directly on blood culture samples identified as positive by a continuous monitoring blood culture system.  Results are intended to be interpreted in conjunction with Gram stain results.            MOST RECENT IMAGING  X-Ray Chest AP Portable  Narrative: EXAMINATION:  XR CHEST AP PORTABLE    CLINICAL HISTORY:  cough;, .    COMPARISON:  11/04/2023    FINDINGS:  An AP view or more reveals the heart to be mildly enlarged.  The trachea is midline.  Right central line/MediPort is unchanged.  No definite infiltrate or effusion is seen.  There is mild elevation of the right hemidiaphragm.  Atherosclerosis seen within the aorta.  Post sternotomy changes are  present.  Impression: 1. Mild cardiomegaly  2. Right central line/MediPort  3. Mildly elevated right hemidiaphragm  4. Atherosclerosis    Electronically signed by: Ilia Shay  Date:    11/07/2023  Time:    14:49  X-Ray Abdomen AP 1 View  Narrative: EXAMINATION:  XR ABDOMEN AP 1 VIEW    CLINICAL HISTORY:  bloated;, .    COMPARISON:  None available    FINDINGS:  Single AP or more views reveal a nonspecific bowel gas pattern without evidence of obstruction. Gas and feces are seen within the colon.  Postsurgical changes are noted to the right upper abdomen and lumbosacral spine as well as the pelvis.  Impression: 1. No diagnostic acute abdominal abnormality identified.    Electronically signed by: Ilia Shay  Date:    11/07/2023  Time:    14:48      LASTECHO  No results found for this or any previous visit.      CURRENT/PREVIOUS VISIT EKG  No results found for this or any previous visit.    ASSESSMENT/PLAN:     Active Hospital Problems    Diagnosis    *Acute cystitis without hematuria    Hypertension    Hypoxemia    Weakness    Urinary tract infection due to extended-spectrum beta lactamase (ESBL) producing Escherichia coli    Hypoglycemia    DM (diabetes mellitus)    DORCAS (acute kidney injury)    Encephalopathy, metabolic       ASSESSMENT & PLAN:   Resolving UTI symptoms.  Continue antibiotics for ESBL infection carbapenems which she is not having reactions to.  Today's day 4/5.  Planning for discharge tomorrow.      Hypoxemia however, complicating her hospital stay.  Will get some of her torsemide back on board today see if that will help with some of her diuresis and therefore shortness breath.    Continue her current diabetic regimen and follow up on her sugars.    Planning for discharge tomorrow.  But I will get Respiratory therapy to giving ambulatory sat to anticipate any oxygen requirements at home to be set up.      RECOMMENDATIONS:  DVT prophylaxis apixaban GI prophylaxis pantoprazole.        Chuck OLIVAS  Shayy SINGH MD  Department of Hospital Medicine ([facility])   Date of Service: 11/13/2023  1:06 PM

## 2023-11-14 LAB
ALBUMIN SERPL-MCNC: 3.2 G/DL (ref 3.4–4.8)
ALBUMIN/GLOB SERPL: 0.8 RATIO (ref 1.1–2)
ALP SERPL-CCNC: 163 UNIT/L (ref 40–150)
ALT SERPL-CCNC: 19 UNIT/L (ref 0–55)
AST SERPL-CCNC: 25 UNIT/L (ref 5–34)
AV PEAK GRADIENT: 9 MMHG
AV VALVE AREA BY VELOCITY RATIO: 1.49 CM²
AV VELOCITY RATIO: 0.49
BASOPHILS # BLD AUTO: 0.13 X10(3)/MCL
BASOPHILS NFR BLD AUTO: 1.1 %
BILIRUB SERPL-MCNC: 1.8 MG/DL
BSA FOR ECHO PROCEDURE: 2.2 M2
BUN SERPL-MCNC: 19 MG/DL (ref 9.8–20.1)
CALCIUM SERPL-MCNC: 9.6 MG/DL (ref 8.4–10.2)
CHLORIDE SERPL-SCNC: 96 MMOL/L (ref 98–107)
CO2 SERPL-SCNC: 26 MMOL/L (ref 23–31)
CREAT SERPL-MCNC: 1.27 MG/DL (ref 0.55–1.02)
CV ECHO LV RWT: 0.46 CM
DOP CALC AO PEAK VEL: 1.53 M/S
DOP CALC LVOT AREA: 3 CM2
DOP CALC LVOT DIAMETER: 1.97 CM
DOP CALC LVOT PEAK VEL: 0.75 M/S
DOP CALC MV VTI: 28.9 CM
E WAVE DECELERATION TIME: 271.37 MSEC
E/A RATIO: 2.72
ECHO LV POSTERIOR WALL: 1.2 CM (ref 0.6–1.1)
EOSINOPHIL # BLD AUTO: 0.12 X10(3)/MCL (ref 0–0.9)
EOSINOPHIL NFR BLD AUTO: 1 %
ERYTHROCYTE [DISTWIDTH] IN BLOOD BY AUTOMATED COUNT: 17.3 % (ref 11.5–17)
FRACTIONAL SHORTENING: 14 % (ref 28–44)
GFR SERPLBLD CREATININE-BSD FMLA CKD-EPI: 45 MLS/MIN/1.73/M2
GLOBULIN SER-MCNC: 4.1 GM/DL (ref 2.4–3.5)
GLUCOSE SERPL-MCNC: 195 MG/DL (ref 82–115)
HCT VFR BLD AUTO: 37.5 % (ref 37–47)
HGB BLD-MCNC: 11.4 G/DL (ref 12–16)
IMM GRANULOCYTES # BLD AUTO: 0.1 X10(3)/MCL (ref 0–0.04)
IMM GRANULOCYTES NFR BLD AUTO: 0.9 %
INTERVENTRICULAR SEPTUM: 1.2 CM (ref 0.6–1.1)
LEFT ATRIUM SIZE: 4.77 CM
LEFT INTERNAL DIMENSION IN SYSTOLE: 4.48 CM (ref 2.1–4)
LEFT VENTRICLE DIASTOLIC VOLUME INDEX: 30.05 ML/M2
LEFT VENTRICLE DIASTOLIC VOLUME: 64 ML
LEFT VENTRICLE MASS INDEX: 117 G/M2
LEFT VENTRICLE SYSTOLIC VOLUME INDEX: 42.8 ML/M2
LEFT VENTRICLE SYSTOLIC VOLUME: 91.25 ML
LEFT VENTRICULAR INTERNAL DIMENSION IN DIASTOLE: 5.2 CM (ref 3.5–6)
LEFT VENTRICULAR MASS: 248.84 G
LYMPHOCYTES # BLD AUTO: 1.21 X10(3)/MCL (ref 0.6–4.6)
LYMPHOCYTES NFR BLD AUTO: 10.3 %
MAGNESIUM SERPL-MCNC: 1.8 MG/DL (ref 1.6–2.6)
MCH RBC QN AUTO: 26.7 PG (ref 27–31)
MCHC RBC AUTO-ENTMCNC: 30.4 G/DL (ref 33–36)
MCV RBC AUTO: 87.8 FL (ref 80–94)
MONOCYTES # BLD AUTO: 0.8 X10(3)/MCL (ref 0.1–1.3)
MONOCYTES NFR BLD AUTO: 6.8 %
MV MEAN GRADIENT: 2 MMHG
MV PEAK A VEL: 0.47 M/S
MV PEAK E VEL: 1.28 M/S
MV PEAK GRADIENT: 8 MMHG
MV STENOSIS PRESSURE HALF TIME: 78.7 MS
MV VALVE AREA P 1/2 METHOD: 2.8 CM2
NEUTROPHILS # BLD AUTO: 9.36 X10(3)/MCL (ref 2.1–9.2)
NEUTROPHILS NFR BLD AUTO: 79.9 %
PHOSPHATE SERPL-MCNC: 3.2 MG/DL (ref 2.3–4.7)
PISA MRMAX VEL: 4.89 M/S
PISA TR MAX VEL: 3.61 M/S
PLATELET # BLD AUTO: 421 X10(3)/MCL (ref 130–400)
PMV BLD AUTO: 9.7 FL (ref 7.4–10.4)
POCT GLUCOSE: 187 MG/DL (ref 70–110)
POCT GLUCOSE: 243 MG/DL (ref 70–110)
POTASSIUM SERPL-SCNC: 4.2 MMOL/L (ref 3.5–5.1)
PROT SERPL-MCNC: 7.3 GM/DL (ref 5.8–7.6)
PV PEAK GRADIENT: 2 MMHG
PV PEAK VELOCITY: 0.69 M/S
RA PRESSURE ESTIMATED: 15 MMHG
RBC # BLD AUTO: 4.27 X10(6)/MCL (ref 4.2–5.4)
RIGHT VENTRICULAR END-DIASTOLIC DIMENSION: 3.86 CM
RV TB RVSP: 19 MMHG
SODIUM SERPL-SCNC: 137 MMOL/L (ref 136–145)
TR MAX PG: 52 MMHG
TV REST PULMONARY ARTERY PRESSURE: 67 MMHG
WBC # SPEC AUTO: 11.72 X10(3)/MCL (ref 4.5–11.5)
Z-SCORE OF LEFT VENTRICULAR DIMENSION IN END DIASTOLE: -2.67
Z-SCORE OF LEFT VENTRICULAR DIMENSION IN END SYSTOLE: 0.62

## 2023-11-14 PROCEDURE — 84100 ASSAY OF PHOSPHORUS: CPT | Performed by: INTERNAL MEDICINE

## 2023-11-14 PROCEDURE — 80053 COMPREHEN METABOLIC PANEL: CPT | Performed by: INTERNAL MEDICINE

## 2023-11-14 PROCEDURE — 97116 GAIT TRAINING THERAPY: CPT

## 2023-11-14 PROCEDURE — A4216 STERILE WATER/SALINE, 10 ML: HCPCS | Performed by: FAMILY MEDICINE

## 2023-11-14 PROCEDURE — 27000207 HC ISOLATION

## 2023-11-14 PROCEDURE — 25000003 PHARM REV CODE 250: Performed by: INTERNAL MEDICINE

## 2023-11-14 PROCEDURE — 25000003 PHARM REV CODE 250: Performed by: FAMILY MEDICINE

## 2023-11-14 PROCEDURE — 97110 THERAPEUTIC EXERCISES: CPT

## 2023-11-14 PROCEDURE — 94761 N-INVAS EAR/PLS OXIMETRY MLT: CPT

## 2023-11-14 PROCEDURE — 21400001 HC TELEMETRY ROOM

## 2023-11-14 PROCEDURE — 85025 COMPLETE CBC W/AUTO DIFF WBC: CPT | Performed by: INTERNAL MEDICINE

## 2023-11-14 PROCEDURE — 63600175 PHARM REV CODE 636 W HCPCS: Performed by: INTERNAL MEDICINE

## 2023-11-14 PROCEDURE — 83735 ASSAY OF MAGNESIUM: CPT | Performed by: INTERNAL MEDICINE

## 2023-11-14 PROCEDURE — 25000003 PHARM REV CODE 250

## 2023-11-14 PROCEDURE — 99900035 HC TECH TIME PER 15 MIN (STAT)

## 2023-11-14 RX ORDER — ONDANSETRON 2 MG/ML
4 INJECTION INTRAMUSCULAR; INTRAVENOUS EVERY 6 HOURS PRN
Status: DISCONTINUED | OUTPATIENT
Start: 2023-11-14 | End: 2023-11-15 | Stop reason: HOSPADM

## 2023-11-14 RX ORDER — DIPHENHYDRAMINE HCL 25 MG
25 CAPSULE ORAL EVERY 6 HOURS PRN
Status: DISCONTINUED | OUTPATIENT
Start: 2023-11-14 | End: 2023-11-15 | Stop reason: HOSPADM

## 2023-11-14 RX ADMIN — MEROPENEM 1 G: 1 INJECTION, POWDER, FOR SOLUTION INTRAVENOUS at 09:11

## 2023-11-14 RX ADMIN — APIXABAN 2.5 MG: 2.5 TABLET, FILM COATED ORAL at 09:11

## 2023-11-14 RX ADMIN — MEROPENEM 1 G: 1 INJECTION, POWDER, FOR SOLUTION INTRAVENOUS at 11:11

## 2023-11-14 RX ADMIN — HYDROCODONE BITARTRATE AND ACETAMINOPHEN 1 TABLET: 5; 325 TABLET ORAL at 01:11

## 2023-11-14 RX ADMIN — SITAGLIPTIN 50 MG: 50 TABLET, FILM COATED ORAL at 09:11

## 2023-11-14 RX ADMIN — SODIUM CHLORIDE, PRESERVATIVE FREE 10 ML: 5 INJECTION INTRAVENOUS at 06:11

## 2023-11-14 RX ADMIN — TORSEMIDE 20 MG: 10 TABLET ORAL at 09:11

## 2023-11-14 RX ADMIN — CHOLECALCIFEROL TAB 25 MCG (1000 UNIT) 2000 UNITS: 25 TAB at 09:11

## 2023-11-14 RX ADMIN — ONDANSETRON 4 MG: 2 INJECTION INTRAMUSCULAR; INTRAVENOUS at 11:11

## 2023-11-14 RX ADMIN — SODIUM CHLORIDE, PRESERVATIVE FREE 10 ML: 5 INJECTION INTRAVENOUS at 05:11

## 2023-11-14 RX ADMIN — VENLAFAXINE HYDROCHLORIDE 75 MG: 37.5 CAPSULE, EXTENDED RELEASE ORAL at 09:11

## 2023-11-14 RX ADMIN — INSULIN ASPART 2 UNITS: 100 INJECTION, SOLUTION INTRAVENOUS; SUBCUTANEOUS at 11:11

## 2023-11-14 RX ADMIN — EZETIMIBE 10 MG: 10 TABLET ORAL at 09:11

## 2023-11-14 RX ADMIN — METOPROLOL SUCCINATE ER TABLETS 25 MG: 25 TABLET, FILM COATED, EXTENDED RELEASE ORAL at 09:11

## 2023-11-14 RX ADMIN — HYDROCODONE BITARTRATE AND ACETAMINOPHEN 1 TABLET: 5; 325 TABLET ORAL at 09:11

## 2023-11-14 RX ADMIN — PANTOPRAZOLE SODIUM 40 MG: 40 TABLET, DELAYED RELEASE ORAL at 09:11

## 2023-11-14 RX ADMIN — DIPHENHYDRAMINE HYDROCHLORIDE 25 MG: 25 CAPSULE ORAL at 11:11

## 2023-11-14 RX ADMIN — SODIUM CHLORIDE, PRESERVATIVE FREE 10 ML: 5 INJECTION INTRAVENOUS at 12:11

## 2023-11-14 NOTE — PT/OT/SLP PROGRESS
"Physical Therapy Treatment    Patient Name:  Kaila Hammond   MRN:  [id number]    Recommendations:     Discharge Recommendations:    Discharge Equipment Recommendations: walker, rolling  Barriers to discharge:  pt current medical status    Assessment:     Kaila Hammond is a 73 y.o. female admitted with a medical diagnosis of Acute cystitis without hematuria.  She presents with the following impairments/functional limitations: weakness, impaired endurance, gait instability, impaired balance     Pt presents with readiness to participate with PT. Pt ambulated 10ft x3 with RW and SBA with 1 bout without AD requiring CGA/Danae due to 1 bout of LOB. While ambulating, RRT present to assess O2 sat while ambulating and pt maintained % verb 1 moment of SOB without AD and requested to sit. Pt tolerated performance of standing and sitting therex: standing marches, seated marches, knee extension, and ankle DF/PF.    Rehab Prognosis: Good; patient would benefit from acute skilled PT services to address these deficits and reach maximum level of function.    Recent Surgery: * No surgery found *      Plan:     During this hospitalization, patient to be seen 5 x/week to address the identified rehab impairments via gait training, therapeutic activities, therapeutic exercises and progress toward the following goals:    Plan of Care Expires:       Subjective     Chief Complaint: "I'm ready to get up"  Patient/Family Comments/goals: to get better so she can leave  Pain/Comfort:         Objective:     Communicated with pt and nurse prior to session.  Patient found up in chair with   upon PT entry to room.     General Precautions: Standard, fall  Orthopedic Precautions:    Braces:    Respiratory Status: Room air     Functional Mobility:  Transfers:     Sit to Stand:  stand by assistance with rolling walker  Gait: Pt amb 10ft x 3 with RW and SBA with 1 bout without AD but required increased A to Danae due to LOB      AM-PAC 6 CLICK " MOBILITY          Treatment & Education:  See above treatment.    Patient left up in chair with all lines intact, call button in reach, and chair alarm on..    GOALS:   Multidisciplinary Problems       Physical Therapy Goals          Problem: Physical Therapy    Goal Priority Disciplines Outcome Goal Variances Interventions   Physical Therapy Goal     PT, PT/OT Ongoing, Progressing     Description: 1.  Pt will improve bed mob to SBA  2.  Pt will improve tf to chair SBA  3.  Pt will amb 50ft RW SBA  4.  Pt will be I HEP to maintain strength while inpt                       Time Tracking:     PT Received On: 11/14/23  PT Start Time: 0920     PT Stop Time: 0945  PT Total Time (min): 25 min     Billable Minutes: Gait Training 15 and Therapeutic Exercise 10    Treatment Type: Treatment  PT/PTA: PT           11/14/2023

## 2023-11-14 NOTE — PLAN OF CARE
Patient currently on O2 at 2L/M nasal cannula continuously, with O2 sat at 98%.  Nurse removed oxygen from patient and while patient at rest, after 3 minutes, Room Air O2 sat checked and was 88%.  Nurse replaced oxygen on patient and O2 sats came up to 97%.

## 2023-11-15 VITALS
OXYGEN SATURATION: 94 % | BODY MASS INDEX: 35.39 KG/M2 | WEIGHT: 225.5 LBS | HEIGHT: 67 IN | TEMPERATURE: 98 F | SYSTOLIC BLOOD PRESSURE: 145 MMHG | DIASTOLIC BLOOD PRESSURE: 80 MMHG | RESPIRATION RATE: 20 BRPM | HEART RATE: 70 BPM

## 2023-11-15 PROBLEM — Z16.12 URINARY TRACT INFECTION DUE TO EXTENDED-SPECTRUM BETA LACTAMASE (ESBL) PRODUCING ESCHERICHIA COLI: Status: RESOLVED | Noted: 2023-11-07 | Resolved: 2023-11-15

## 2023-11-15 PROBLEM — G93.41 ENCEPHALOPATHY, METABOLIC: Status: RESOLVED | Noted: 2023-11-05 | Resolved: 2023-11-15

## 2023-11-15 PROBLEM — E16.2 HYPOGLYCEMIA: Status: RESOLVED | Noted: 2023-11-06 | Resolved: 2023-11-15

## 2023-11-15 PROBLEM — N39.0 URINARY TRACT INFECTION DUE TO EXTENDED-SPECTRUM BETA LACTAMASE (ESBL) PRODUCING ESCHERICHIA COLI: Status: RESOLVED | Noted: 2023-11-07 | Resolved: 2023-11-15

## 2023-11-15 PROBLEM — N30.00 ACUTE CYSTITIS WITHOUT HEMATURIA: Status: RESOLVED | Noted: 2023-11-05 | Resolved: 2023-11-15

## 2023-11-15 PROBLEM — I50.20 SYSTOLIC CHF: Status: ACTIVE | Noted: 2023-11-15

## 2023-11-15 PROBLEM — B96.29 URINARY TRACT INFECTION DUE TO EXTENDED-SPECTRUM BETA LACTAMASE (ESBL) PRODUCING ESCHERICHIA COLI: Status: RESOLVED | Noted: 2023-11-07 | Resolved: 2023-11-15

## 2023-11-15 PROBLEM — N17.9 AKI (ACUTE KIDNEY INJURY): Status: RESOLVED | Noted: 2023-11-05 | Resolved: 2023-11-15

## 2023-11-15 LAB
POCT GLUCOSE: 161 MG/DL (ref 70–110)
POCT GLUCOSE: 221 MG/DL (ref 70–110)

## 2023-11-15 PROCEDURE — 63600175 PHARM REV CODE 636 W HCPCS: Performed by: INTERNAL MEDICINE

## 2023-11-15 PROCEDURE — 94761 N-INVAS EAR/PLS OXIMETRY MLT: CPT

## 2023-11-15 PROCEDURE — 25000003 PHARM REV CODE 250: Performed by: FAMILY MEDICINE

## 2023-11-15 PROCEDURE — 25000003 PHARM REV CODE 250: Performed by: INTERNAL MEDICINE

## 2023-11-15 PROCEDURE — A4216 STERILE WATER/SALINE, 10 ML: HCPCS | Performed by: FAMILY MEDICINE

## 2023-11-15 PROCEDURE — 97116 GAIT TRAINING THERAPY: CPT

## 2023-11-15 RX ORDER — BUSPIRONE HYDROCHLORIDE 5 MG/1
5 TABLET ORAL EVERY 8 HOURS PRN
Qty: 30 TABLET | Refills: 0 | Status: SHIPPED | OUTPATIENT
Start: 2023-11-15

## 2023-11-15 RX ORDER — SACUBITRIL AND VALSARTAN 24; 26 MG/1; MG/1
1 TABLET, FILM COATED ORAL 2 TIMES DAILY
Qty: 60 TABLET | Refills: 0 | Status: SHIPPED | OUTPATIENT
Start: 2023-11-15

## 2023-11-15 RX ADMIN — HYDROCODONE BITARTRATE AND ACETAMINOPHEN 1 TABLET: 5; 325 TABLET ORAL at 03:11

## 2023-11-15 RX ADMIN — APIXABAN 2.5 MG: 2.5 TABLET, FILM COATED ORAL at 09:11

## 2023-11-15 RX ADMIN — CHOLECALCIFEROL TAB 25 MCG (1000 UNIT) 2000 UNITS: 25 TAB at 09:11

## 2023-11-15 RX ADMIN — SODIUM CHLORIDE, PRESERVATIVE FREE 10 ML: 5 INJECTION INTRAVENOUS at 06:11

## 2023-11-15 RX ADMIN — METOPROLOL SUCCINATE ER TABLETS 25 MG: 25 TABLET, FILM COATED, EXTENDED RELEASE ORAL at 09:11

## 2023-11-15 RX ADMIN — INSULIN ASPART 2 UNITS: 100 INJECTION, SOLUTION INTRAVENOUS; SUBCUTANEOUS at 11:11

## 2023-11-15 RX ADMIN — TORSEMIDE 20 MG: 10 TABLET ORAL at 09:11

## 2023-11-15 RX ADMIN — PANTOPRAZOLE SODIUM 40 MG: 40 TABLET, DELAYED RELEASE ORAL at 09:11

## 2023-11-15 RX ADMIN — SODIUM CHLORIDE, PRESERVATIVE FREE 10 ML: 5 INJECTION INTRAVENOUS at 12:11

## 2023-11-15 RX ADMIN — SITAGLIPTIN 50 MG: 50 TABLET, FILM COATED ORAL at 09:11

## 2023-11-15 RX ADMIN — VENLAFAXINE HYDROCHLORIDE 75 MG: 37.5 CAPSULE, EXTENDED RELEASE ORAL at 09:11

## 2023-11-15 NOTE — PT/OT/SLP PROGRESS
"Physical Therapy Treatment    Patient Name:  Kaila Hammond   MRN:  [id number]    Recommendations:     Discharge Recommendations:    Discharge Equipment Recommendations: walker, rolling  Barriers to discharge:  pt current medical status    Assessment:     Kaila Hammond is a 73 y.o. female admitted with a medical diagnosis of Acute cystitis without hematuria.  She presents with the following impairments/functional limitations: weakness, impaired endurance, gait instability, impaired balance     Pt presents with readiness to participate with PT. Pt ambulated 10ft x3 with RW and SBA with 1 bout without AD requiring CGA/Danae due to 1 bout of LOB. While ambulating, RRT present to assess O2 sat while ambulating and pt maintained % verb 1 moment of SOB without AD and requested to sit. Pt tolerated performance of standing and sitting therex: standing marches, seated marches, knee extension, and ankle DF/PF.    Rehab Prognosis: Good; patient would benefit from acute skilled PT services to address these deficits and reach maximum level of function.    Recent Surgery: * No surgery found *      Plan:     During this hospitalization, patient to be seen 5 x/week to address the identified rehab impairments via gait training, therapeutic activities, therapeutic exercises and progress toward the following goals:    Plan of Care Expires:       Subjective     Chief Complaint: "I'm ready to walk a little"  Patient/Family Comments/goals: to get better so she can leave  Pain/Comfort:         Objective:     Communicated with pt and nurse prior to session.  Patient found up in chair with   upon PT entry to room.     General Precautions: Standard, fall  Orthopedic Precautions:    Braces:    Respiratory Status: Room air     Functional Mobility:  Transfers:     Sit to Stand:  stand by assistance with rolling walker  Gait: Pt amb 25ft x 3 with RW and SBA   TF to chair SBA      AM-PAC 6 CLICK MOBILITY          Treatment & " Education:  See above treatment.    Patient left up in chair with all lines intact, call button in reach, and chair alarm on..    GOALS:   Multidisciplinary Problems       Physical Therapy Goals          Problem: Physical Therapy    Goal Priority Disciplines Outcome Goal Variances Interventions   Physical Therapy Goal     PT, PT/OT Ongoing, Progressing     Description: 1.  Pt will improve bed mob to SBA  2.  Pt will improve tf to chair SBA  3.  Pt will amb 50ft RW SBA  4.  Pt will be I HEP to maintain strength while inpt                       Time Tracking:     PT Received On: 11/15/23  PT Start Time: 0915     PT Stop Time: 0930  PT Total Time (min): 15 min     Billable Minutes: Gait Training 15 and Therapeutic Exercise 0    Treatment Type: Treatment  PT/PTA: PT           11/15/2023

## 2023-11-15 NOTE — NURSING
The patient was discharged home today with family care and home . The patient was educated via AVS in regards to medication changes, F/U APPTs, and clinical documents. The patient has no further questions at this time. Prescription were called into the medicine [facility] pharmacy d/t pt request. The patient was transported down to the main entrance to family car. It was a pleasure taking care of the patient.

## 2023-11-15 NOTE — PLAN OF CARE
11/15/23 1533   Final Note   Assessment Type Final Discharge Note   Anticipated Discharge Disposition Home   Post-Acute Status   Discharge Delays None known at this time     No needs.  Home O2 was delivered to patient's room yesterday from [facility].  Patient did not want the RW stated that she has some at home already.

## 2023-11-15 NOTE — PROGRESS NOTES
[facility]  [address] [place] [address]  Phone: [phone]    (Hospital) Internal Medicine  Progress Note      PATIENT NAME: Kaila Hammond  MRN: [id number]  TODAY'S DATE: 11/14/2023  ADMIT DATE: 11/4/2023    SUBJECTIVE     PRINCIPLE PROBLEM: Acute cystitis without hematuria    INTERVAL HISTORY:    11/14/2023  Patient was continued shortness of breath overnight.  Her O2 sats ambulating were 88% today.  We have asked case management to assist with O2 at home.  Patient had an echocardiogram today.  Echocardiogram showed a depressed EF 25-35%.  Actively pursuing engagement with Cardiology to assess compared last 1.    Review of patient's allergies indicates:   Allergen Reactions    Augmentin [amoxicillin-pot clavulanate]     Cefaclor     Cephalexin     Penicillins     Sulfa (sulfonamide antibiotics)     Sulfamethoxazole-trimethoprim Hives       ROS-14 point review of systems except shortness of breath.    OBJECTIVE     VITAL SIGNS (Most Recent)  Temp: 98.5 °F (36.9 °C) (11/14/23 2037)  Pulse: 81 (11/14/23 2037)  Resp: 18 (11/14/23 2121)  BP: 131/80 (11/14/23 2121)  SpO2: (!) 90 % (11/14/23 2037)    VENTILATION STATUS  Resp: 18 (11/14/23 2121)  SpO2: (!) 90 % (11/14/23 2037)           I & O (Last 24H):  Intake/Output Summary (Last 24 hours) at 11/14/2023 2237  Last data filed at 11/14/2023 1722  Gross per 24 hour   Intake 1082.18 ml   Output 975 ml   Net 107.18 ml       WEIGHTS  Wt Readings from Last 3 Encounters:   11/04/23 2119 102.3 kg (225 lb 8.5 oz)   11/04/23 1657 99.8 kg (220 lb)   05/24/23 0833 104.3 kg (230 lb)   05/17/23 0849 104.3 kg (230 lb)       Physical Exam    Patient is alert orient x3.  Cranial nerves 2-12 are intact.  I came back to visit tonight she is better.  No JVD regular rate and rhythm no rubs gallops or murmurs crackles bilateral bases.  Abdomen is soft nontender nondistended no clubbing cyanosis.  +1 edema bilateral lower extremities.    SCHEDULED  "MEDS:   apixaban  2.5 mg Oral BID    ezetimibe  10 mg Oral QHS    fluticasone propionate  1 spray Each Nostril BID    levothyroxine  50 mcg Oral Before breakfast    meropenem (MERREM) IVPB  1 g Intravenous Q12H    metoprolol succinate  25 mg Oral BID    pantoprazole  40 mg Oral Daily    SITagliptin phosphate  50 mg Oral Daily    sodium chloride 0.9%  10 mL Intravenous Q6H    torsemide  20 mg Oral Daily    venlafaxine  75 mg Oral Daily    vitamin D  2,000 Units Oral Daily       CONTINUOUS INFUSIONS:    PRN MEDS:benzonatate, busPIRone, dextrose 10%, dextrose 10%, glucose, glucose, HYDROcodone-acetaminophen, insulin aspart U-100, ondansetron, sodium chloride 0.9%, Flushing PICC/Midline Protocol **AND** sodium chloride 0.9% **AND** sodium chloride 0.9%    LABS AND DIAGNOSTICS     CBC LAST 3 DAYS  Recent Labs   Lab 11/09/23 0359 11/11/23 0648 11/14/23  0429   WBC 7.09 8.49 11.72*   RBC 4.16* 4.26 4.27   HGB 10.7* 11.0* 11.4*   HCT 35.7* 37.1 37.5   MCV 85.8 87.1 87.8   MCH 25.7* 25.8* 26.7*   MCHC 30.0* 29.6* 30.4*   RDW 16.0 16.3 17.3*    322 421*   MPV 10.2 9.4 9.7       COAGULATION LAST 3 DAYS  No results for input(s): "LABPT", "INR", "APTT" in the last 168 hours.    CHEMISTRY LAST 3 DAYS  Recent Labs   Lab 11/10/23  0353 11/11/23  0648 11/14/23  0429    139 137   K 4.1 3.8 4.2   CO2 26 26 26   BUN 17.0 13.0 19.0   CREATININE 1.33* 1.30* 1.27*   CALCIUM 9.4 9.1 9.6   MG 2.00 1.90 1.80   ALBUMIN 2.6* 2.7* 3.2*   ALKPHOS 176* 159* 163*   ALT 35 29 19   AST 29 30 25   BILITOT 1.3 1.5 1.8*       CARDIAC PROFILE LAST 3 DAYS  No results for input(s): "BNP", "CPK", "CPKMB", "LDH", "TROPONINI" in the last 168 hours.    ENDOCRINE LAST 3 DAYS  No results for input(s): "TSH", "PROCAL" in the last 168 hours.    LAST ARTERIAL BLOOD GAS  ABG  No results for input(s): "PH", "PO2", "PCO2", "HCO3", "BE" in the last 168 hours.    LAST 7 DAYS MICROBIOLOGY   Microbiology Results (last 7 days)       Procedure Component " Value Units Date/Time    Blood culture #2 **CANNOT BE ORDERED STAT** [[id number]]  (Normal) Collected: 11/04/23 1720    Order Status: Completed Specimen: Blood Updated: 11/10/23 1701     CULTURE, BLOOD ([facility]) No Growth at 5 days    Blood culture #1 **CANNOT BE ORDERED STAT** [[id number]]  (Abnormal)  (Susceptibility) Collected: 11/04/23 1722    Order Status: Completed Specimen: Blood Updated: 11/08/23 0652     CULTURE, BLOOD ([facility]) Staphylococcus hominis     GRAM STAIN Gram Positive Cocci, probable Staphylococcus      Seen in gram stain of broth only      1 of 2 Aerobic bottles positive            MOST RECENT IMAGING  Echo    Left Ventricle: The left ventricle is dilated. Mildly increased wall   thickness. There is severely reduced systolic function with a visually   estimated ejection fraction of 25 - 30%.    Right Ventricle: Right ventricle was not well visualized due to poor   acoustic window. Moderate right ventricular enlargement. Systolic function   is moderately reduced.    Left Atrium: Left atrium is dilated.    Right Atrium: Right atrium is moderately dilated.    Aortic Valve: The aortic valve is a trileaflet valve. Mildly calcified   cusps.    Mitral Valve: Moderately thickened leaflets. Moderately calcified   leaflets posterior >anterior. There is no stenosis. The mean pressure   gradient across the mitral valve is 2 mmHg at a heart rate of  bpm. There   is mild to moderate regurgitation.    Tricuspid Valve: There is no stenosis. There is moderate to severe   regurgitation.    Pulmonic Valve: There is no stenosis. There is moderate regurgitation.    IVC/SVC: Elevated venous pressure at 15 mmHg.  X-Ray Chest 1 View  Narrative: EXAMINATION:  XR CHEST 1 VIEW    CLINICAL HISTORY:  Is there development of hosp acquired pneumonia.;, .    COMPARISON:  11/07/2023    FINDINGS:  An AP view or more reveals the heart to be borderline enlarged.  The trachea is midline.  Post sternotomy changes are present.  Right  central line/MediPort is unchanged.  There is thickening of the right minor fissure.  There is slight blunting of the costophrenic sulci.  Degenerative changes and curvature are noted to the thoracic spine.  Impression: 1. Borderline cardiomegaly  2. Thickening of the right minor fissure  3. Slight blunting of the costophrenic sulci suggesting small bibasilar pleural reaction  4. Right central line/MediPort  5. Atherosclerosis    Electronically signed by: Ilia Shay  Date:    11/14/2023  Time:    14:06      [facility]  Results for orders placed during the hospital encounter of 11/04/23    Echo    Interpretation Summary    Left Ventricle: The left ventricle is dilated. Mildly increased wall thickness. There is severely reduced systolic function with a visually estimated ejection fraction of 25 - 30%.    Right Ventricle: Right ventricle was not well visualized due to poor acoustic window. Moderate right ventricular enlargement. Systolic function is moderately reduced.    Left Atrium: Left atrium is dilated.    Right Atrium: Right atrium is moderately dilated.    Aortic Valve: The aortic valve is a trileaflet valve. Mildly calcified cusps.    Mitral Valve: Moderately thickened leaflets. Moderately calcified leaflets posterior >anterior. There is no stenosis. The mean pressure gradient across the mitral valve is 2 mmHg at a heart rate of  bpm. There is mild to moderate regurgitation.    Tricuspid Valve: There is no stenosis. There is moderate to severe regurgitation.    Pulmonic Valve: There is no stenosis. There is moderate regurgitation.    IVC/SVC: Elevated venous pressure at 15 mmHg.      CURRENT/PREVIOUS VISIT EKG  No results found for this or any previous visit.    ASSESSMENT/PLAN:     Active Hospital Problems    Diagnosis    *Acute cystitis without hematuria    Hypertension    Hypoxemia    Weakness    Urinary tract infection due to extended-spectrum beta lactamase (ESBL) producing Escherichia coli     Hypoglycemia    DM (diabetes mellitus)    DORCAS (acute kidney injury)    Encephalopathy, metabolic       ASSESSMENT & PLAN:   Patient will finish antibiotics today will stop those.      Continue blood pressure medicines.  Reinstated her diuretics.  She feels better tonight after that.  Hypoxemia better.  Will send her still send her home on oxygen because of her shortness of breath and hypoxemia possibly related to congestive heart failure.      I have discussed informally with her cardiologist to review echo remotely and give me his opinion.  Which she would be a good Entresto candidate?  Will follow up on that considering her renal function.      Continue current diabetic and hypertensive regimen.      Plan on discharge likely tomorrow.      RECOMMENDATIONS:  DVT prophylaxis with her factor Xa inhibitor.  GI prophylaxis pantoprazole.        Chuck Lucas III, MD  Department of Hospital Medicine ([facility])   Date of Service: 11/14/2023  10:37 PM

## 2023-11-15 NOTE — PLAN OF CARE
Problem: Infection  Goal: Absence of Infection Signs and Symptoms  Outcome: Met     Problem: Adult Inpatient Plan of Care  Goal: Plan of Care Review  Outcome: Met  Goal: Patient-Specific Goal (Individualized)  Outcome: Met  Goal: Absence of Hospital-Acquired Illness or Injury  Outcome: Met  Goal: Optimal Comfort and Wellbeing  Outcome: Met  Goal: Readiness for Transition of Care  Outcome: Met     Problem: Diabetes Comorbidity  Goal: Blood Glucose Level Within Targeted Range  Outcome: Met     Problem: Hypertension Comorbidity  Goal: Blood Pressure in Desired Range  Outcome: Met     Problem: UTI (Urinary Tract Infection)  Goal: Improved Infection Symptoms  Outcome: Met     Problem: Fluid and Electrolyte Imbalance (Acute Kidney Injury/Impairment)  Goal: Fluid and Electrolyte Balance  Outcome: Met     Problem: Oral Intake Inadequate (Acute Kidney Injury/Impairment)  Goal: Optimal Nutrition Intake  Outcome: Met     Problem: Renal Function Impairment (Acute Kidney Injury/Impairment)  Goal: Effective Renal Function  Outcome: Met     Problem: Fall Injury Risk  Goal: Absence of Fall and Fall-Related Injury  Outcome: Met     Problem: Physical Therapy  Goal: Physical Therapy Goal  Description: 1.  Pt will improve bed mob to SBA  2.  Pt will improve tf to chair SBA  3.  Pt will amb 50ft RW SBA  4.  Pt will be I HEP to maintain strength while inpt  Outcome: Met     Problem: Dysrhythmia  Goal: Normalized Cardiac Rhythm  Outcome: Met

## 2023-11-16 NOTE — HOSPITAL COURSE
73 white female who was admitted for ESBL sepsis.  Initially her stent in the ICU was complicated by some confusion acute renal failure that resolved with IV fluids.  Once the cultures came back and proper antibiotic therapy was initiated she started to improve.  Of note, she new test dosing of a carbapenem because she would a previous penicillin allergy that was relegated to only a rash remotely.  We did test dosing on Friday and then on Saturday she began day 1 of 5 days of requisite ESBL coverage.  Showed continued improvement.  Her renal function improved from that standpoint she is been essentially clinically cured from that infection.      Most concerning during her hospital stay she started to have some shortness of breath and associated anxiety.  Patient does have a history of heart failure and a CABG about year to 2 years ago under the care of Dr. Mckenna.  I got an echo on her last day which showed depressed EF of 25-35% which Dr. Mckenna correlated informally is being lower than it has been in the recent past and I discussed with him in formalin about restarting her on some Entresto.  I will discharge her home on this on low doses 24/26 b.i.d. have her follow up with me in 1-2 weeks to follow up on that.  She was on no other ACE inhibitors during the hospital stay but she was on an ACE inhibitor when she came in.  I told her specifically stopped that and do not resume it at home even though it is not in her medical record.  She understands this.      She is to also have some home oxygen that is being delivered.  She will follow up with me again and notify me if she is any worsening fevers chills shortness of breath.  With regards to anxiety Santiago p.r.n..  I think that is tied or shortness a breath.    She will also go home and she was assured by her family that she has someone is going to be with her for the next couple days to transition her back to her autonomous state.  Told her, she has any problems.   She understands.

## 2023-11-16 NOTE — DISCHARGE SUMMARY
[facility]  Hospital Medicine  Discharge Summary      Patient Name: Kaila Hammond  MRN: [id number]  TREVOR: [id number]  Patient Class: IP- Inpatient  Admission Date: 11/4/2023  Hospital Length of Stay: 11 days  Discharge Date and Time:  11/15/2023 11:03 PM  Attending Physician: No att. providers found   Discharging Provider: Chuck Lucas III, MD  Primary Care Provider: Chuck Lucas III, MD    Primary Care Team: Networked reference to record PCT     HPI:   No notes on file    * No surgery found *      Hospital Course:   73 white female who was admitted for ESBL sepsis.  Initially her stent in the ICU was complicated by some confusion acute renal failure that resolved with IV fluids.  Once the cultures came back and proper antibiotic therapy was initiated she started to improve.  Of note, she new test dosing of a carbapenem because she would a previous penicillin allergy that was relegated to only a rash remotely.  We did test dosing on Friday and then on Saturday she began day 1 of 5 days of requisite ESBL coverage.  Showed continued improvement.  Her renal function improved from that standpoint she is been essentially clinically cured from that infection.      Most concerning during her hospital stay she started to have some shortness of breath and associated anxiety.  Patient does have a history of heart failure and a CABG about year to 2 years ago under the care of Dr. Mckenna.  I got an echo on her last day which showed depressed EF of 25-35% which Dr. Mckenna correlated informally is being lower than it has been in the recent past and I discussed with him in formalin about restarting her on some Entresto.  I will discharge her home on this on low doses 24/26 b.i.d. have her follow up with me in 1-2 weeks to follow up on that.  She was on no other ACE inhibitors during the hospital stay but she was on an ACE inhibitor when she came in.  I told her specifically stopped  that and do not resume it at home even though it is not in her medical record.  She understands this.      She is to also have some home oxygen that is being delivered.  She will follow up with me again and notify me if she is any worsening fevers chills shortness of breath.  With regards to anxiety Santiago kenyon.r.n..  I think that is tied or shortness a breath.    She will also go home and she was assured by her family that she has someone is going to be with her for the next couple days to transition her back to her autonomous state.  Told her, she has any problems.  She understands.     Goals of Care Treatment Preferences:  Code Status: Full Code      Consults:     No new Assessment & Plan notes have been filed under this hospital service since the last note was generated.  Service: Hospital Medicine    Final Active Diagnoses:    Diagnosis Date Noted POA    Systolic CHF [I50.20] 11/15/2023 Yes    Hypertension [I10] 11/13/2023 Yes    Hypoxemia [R09.02] 11/11/2023 No    Weakness [R53.1] 11/10/2023 Yes    DM (diabetes mellitus) [E11.9]  Yes      Problems Resolved During this Admission:    Diagnosis Date Noted Date Resolved POA    PRINCIPAL PROBLEM:  Acute cystitis without hematuria [N30.00] 11/05/2023 11/15/2023 Yes    Urinary tract infection due to extended-spectrum beta lactamase (ESBL) producing Escherichia coli [N39.0, B96.29, Z16.12] 11/07/2023 11/15/2023 Yes    Hypoglycemia [E16.2] 11/06/2023 11/15/2023 Yes    DORCAS (acute kidney injury) [N17.9] 11/05/2023 11/15/2023 Yes    Encephalopathy, metabolic [G93.41] 11/05/2023 11/15/2023 Yes       Discharged Condition: fair    Disposition: Home or Self Care    Follow Up:   Follow-up Information       Chuck Lucas III, MD. Go on 11/20/2023.    Specialty: Internal Medicine  Why: @ 1:15 PM  Contact information:  [address] [facility]  [place] [address]  [phone]               Mark Mckenna MD. Schedule an appointment as soon as possible for a visit.   "  Specialty: Cardiology  Why: Wanda will call with a follow up APPT from office.  Contact information:  [address] [place] [address] [phone]                           Patient Instructions:      WALKER FOR HOME USE     Order Specific Question Answer Comments   Type of Walker: Adult (5'4"-6'6")    With wheels? Yes    Height: 5' 7" (1.702 m)    Weight: 102.3 kg (225 lb 8.5 oz)    Length of need (1-99 months): 99    Does patient have medical equipment at home? none    Please check all that apply: Patient's condition impairs ambulation.    Please check all that apply: Patient needs help to get in and out of chair.    Please check all that apply: Walker will be used for gait training.    Please check all that apply: Patient is unable to safely ambulate without equipment.      OXYGEN FOR HOME USE     Order Specific Question Answer Comments   Liter Flow 2    Duration Continuous    Qualifying Test Performed at: Rest    Oxygen saturation: 88    Portable mode: continuous    Route nasal cannula    Device: home concentrator with portable tanks conserving device   Length of need (in months): 99 mos    Patient condition with qualifying saturation COPD    Height: 5' 7" (1.702 m)    Weight: 102.3 kg (225 lb 8.5 oz)    Alternative treatment measures have been tried or considered and deemed clinically ineffective. Yes        Significant Diagnostic Studies: Labs: All labs within the past 24 hours have been reviewed  Radiology: X-Ray: CXR: X-Ray Chest 1 View (CXR):   Results for orders placed or performed during the hospital encounter of 11/04/23   X-Ray Chest 1 View    Narrative    EXAMINATION:  XR CHEST 1 VIEW    CLINICAL HISTORY:  Is there development of hosp acquired pneumonia.;, .    COMPARISON:  11/07/2023    FINDINGS:  An AP view or more reveals the heart to be borderline enlarged.  The trachea is midline.  Post sternotomy changes are present.  Right central line/MediPort is unchanged.  There is thickening of the " right minor fissure.  There is slight blunting of the costophrenic sulci.  Degenerative changes and curvature are noted to the thoracic spine.      Impression    1. Borderline cardiomegaly  2. Thickening of the right minor fissure  3. Slight blunting of the costophrenic sulci suggesting small bibasilar pleural reaction  4. Right central line/MediPort  5. Atherosclerosis      Electronically signed by: Ilia Shay  Date:    11/14/2023  Time:    14:06     Cardiac Graphics: Echocardiogram: Transthoracic echo (TTE) complete (Cupid Only):   Results for orders placed or performed during the hospital encounter of 11/04/23   Echo   Result Value Ref Range    BSA 2.2 m2    LVIDd 5.2 3.5 - 6.0 cm    LV Systolic Volume 91.25 mL    LV Systolic Volume Index 42.8 mL/m2    LVIDs 4.48 (A) 2.1 - 4.0 cm    LV Diastolic Volume 64.00 mL    LV Diastolic Volume Index 30.05 mL/m2    IVS 1.2 (A) 0.6 - 1.1 cm    LVOT diameter 1.97 cm    LVOT area 3.0 cm2    FS 14 (A) 28 - 44 %    Left Ventricle Relative Wall Thickness 0.46 cm    Posterior Wall 1.2 (A) 0.6 - 1.1 cm    LV mass 248.84 g    LV Mass Index 117 g/m2    MV Peak E Hudson 1.28 m/s    MV Peak A Hudson 0.47 m/s    TR Max Hudson 3.61 m/s    E/A ratio 2.72     E wave deceleration time 271.37 msec    LVOT peak hudson 0.75 m/s    RVDD 3.86 cm    LA size 4.77 cm    AV peak gradient 9 mmHg    Ao peak hudson 1.53 m/s    AV Velocity Ratio 0.49     NOEL by Velocity Ratio 1.49 cm²    Mr max hudson 4.89 m/s    MV mean gradient 2 mmHg    MV peak gradient 8 mmHg    MV stenosis pressure 1/2 time 78.70 ms    MV valve area p 1/2 method 2.80 cm2    MV VTI 28.9 cm    Triscuspid Valve Regurgitation Peak Gradient 52 mmHg    PV PEAK VELOCITY 0.69 m/s    PV peak gradient 2 mmHg    ZLVIDS 0.62     ZLVIDD -2.67     TV resting pulmonary artery pressure 67 mmHg    RV TB RVSP 19 mmHg    Est. RA pres 15 mmHg    Narrative      Left Ventricle: The left ventricle is dilated. Mildly increased wall   thickness. There is severely reduced  systolic function with a visually   estimated ejection fraction of 25 - 30%.    Right Ventricle: Right ventricle was not well visualized due to poor   acoustic window. Moderate right ventricular enlargement. Systolic function   is moderately reduced.    Left Atrium: Left atrium is dilated.    Right Atrium: Right atrium is moderately dilated.    Aortic Valve: The aortic valve is a trileaflet valve. Mildly calcified   cusps.    Mitral Valve: Moderately thickened leaflets. Moderately calcified   leaflets posterior >anterior. There is no stenosis. The mean pressure   gradient across the mitral valve is 2 mmHg at a heart rate of  bpm. There   is mild to moderate regurgitation.    Tricuspid Valve: There is no stenosis. There is moderate to severe   regurgitation.    Pulmonic Valve: There is no stenosis. There is moderate regurgitation.    IVC/SVC: Elevated venous pressure at 15 mmHg.           Pending Diagnostic Studies:       None           Medications:  Reconciled Home Medications:      Medication List        START taking these medications      busPIRone 5 MG Tab  Commonly known as: BUSPAR  Take 1 tablet (5 mg total) by mouth every 8 (eight) hours as needed (for anxiety).     ENTRESTO 24-26 mg per tablet  Generic drug: sacubitriL-valsartan  Take 1 tablet by mouth 2 (two) times daily.            CONTINUE taking these medications      CENTRUM SILVER WOMEN 8 mg iron-400 mcg-50 mcg Tab  Generic drug: multivit-min-iron-FA-vit K-lut  Take 1 tablet by mouth.     ELIQUIS 2.5 mg Tab  Generic drug: apixaban     ezetimibe 10 mg tablet  Commonly known as: ZETIA     ferrous gluconate 240 (27 FE) MG tablet  Commonly known as: FERGON  Take 240 mg by mouth.     gemfibroziL 600 MG tablet  Commonly known as: LOPID  Take 600 mg by mouth.     glimepiride 1 MG tablet  Commonly known as: AMARYL     HYDROcodone-acetaminophen 5-325 mg per tablet  Commonly known as: NORCO     JANUVIA 100 MG Tab  Generic drug: sitaGLIPtin phosphate      levothyroxine 50 MCG tablet  Commonly known as: SYNTHROID  Take 50 mcg by mouth before breakfast.     metFORMIN 500 MG tablet  Commonly known as: GLUCOPHAGE  Take 500 mg by mouth 2 (two) times daily.     metoprolol succinate 25 MG 24 hr tablet  Commonly known as: TOPROL-XL  Take 25 mg by mouth 2 (two) times daily.     omeprazole 20 MG capsule  Commonly known as: PRILOSEC  Take 20 mg by mouth once daily.     OZEMPIC 0.25 mg or 0.5 mg(2 mg/1.5 mL) pen injector  Generic drug: semaglutide     pregabalin 100 MG capsule  Commonly known as: LYRICA     torsemide 20 MG Tab  Commonly known as: DEMADEX     venlafaxine 100 MG Tab  Commonly known as: EFFEXOR     VITAMIN D3 50 mcg (2,000 unit) Cap capsule  Generic drug: cholecalciferol (vitamin D3)  Take 2,000 Units by mouth.     zinc gluconate 50 mg tablet  Take 50 mg by mouth once daily.              Indwelling Lines/Drains at time of discharge:   Lines/Drains/Airways       None                   Time spent on the discharge of patient: 45 minutes         Chuck Lucas III, MD  Department of Hospital Medicine  [facility]

## 2023-11-20 ENCOUNTER — PATIENT OUTREACH (OUTPATIENT)
Dept: ADMINISTRATIVE | Facility: CLINIC | Age: 73
End: 2023-11-20
Payer: MEDICARE

## 2023-11-20 NOTE — PROGRESS NOTES
C3 nurse spoke with Kaila Hammond  for a TCC post hospital discharge follow up call. The patient has a scheduled HOSFU appointment with Chuck Lucas III, MD  on 11/20/2023 @ 115 pm.

## 2024-04-01 ENCOUNTER — HOSPITAL ENCOUNTER (OUTPATIENT)
Dept: RADIOLOGY | Facility: HOSPITAL | Age: 74
Discharge: HOME | End: 2024-04-01
Attending: INTERNAL MEDICINE
Payer: MEDICARE

## 2024-04-01 DIAGNOSIS — Z78.0 POSTMENOPAUSE: ICD-10-CM

## 2024-04-01 DIAGNOSIS — Z78.0 POSTMENOPAUSAL: ICD-10-CM

## 2024-04-01 DIAGNOSIS — Z12.31 ENCOUNTER FOR SCREENING MAMMOGRAM FOR MALIGNANT NEOPLASM OF BREAST: ICD-10-CM

## 2024-04-01 DIAGNOSIS — Z86.010 PERSONAL HISTORY OF COLONIC POLYPS: Primary | ICD-10-CM

## 2024-04-01 PROCEDURE — 77081 DXA BONE DENSITY APPENDICULR: CPT | Mod: 59,TC

## 2024-04-01 PROCEDURE — 77063 BREAST TOMOSYNTHESIS BI: CPT | Mod: 26,GZ,, | Performed by: STUDENT IN AN ORGANIZED HEALTH CARE EDUCATION/TRAINING PROGRAM

## 2024-04-01 PROCEDURE — 77067 SCR MAMMO BI INCL CAD: CPT | Mod: 26,GZ,, | Performed by: STUDENT IN AN ORGANIZED HEALTH CARE EDUCATION/TRAINING PROGRAM

## 2024-04-01 PROCEDURE — 77063 BREAST TOMOSYNTHESIS BI: CPT | Mod: GZ,TC

## 2024-04-01 PROCEDURE — 77080 DXA BONE DENSITY AXIAL: CPT | Mod: TC

## 2024-04-02 ENCOUNTER — CLINICAL SUPPORT (OUTPATIENT)
Dept: RESPIRATORY THERAPY | Facility: HOSPITAL | Age: 74
End: 2024-04-02
Attending: INTERNAL MEDICINE
Payer: MEDICARE

## 2024-04-02 ENCOUNTER — ANESTHESIA EVENT (OUTPATIENT)
Dept: SURGERY | Facility: HOSPITAL | Age: 74
End: 2024-04-02
Payer: MEDICARE

## 2024-04-02 DIAGNOSIS — Z86.010 PERSONAL HISTORY OF COLONIC POLYPS: ICD-10-CM

## 2024-04-02 LAB
OHS QRS DURATION: 104 MS
OHS QTC CALCULATION: 465 MS

## 2024-04-02 PROCEDURE — 93010 ELECTROCARDIOGRAM REPORT: CPT | Mod: ,,, | Performed by: INTERNAL MEDICINE

## 2024-04-02 PROCEDURE — 93005 ELECTROCARDIOGRAM TRACING: CPT

## 2024-04-02 RX ORDER — LEVOTHYROXINE SODIUM 100 UG/1
100 TABLET ORAL
COMMUNITY
Start: 2024-03-21

## 2024-04-02 RX ORDER — HYDROCODONE BITARTRATE AND ACETAMINOPHEN 7.5; 325 MG/1; MG/1
1 TABLET ORAL EVERY 6 HOURS PRN
COMMUNITY

## 2024-04-02 RX ORDER — FUROSEMIDE 40 MG/1
40 TABLET ORAL DAILY PRN
COMMUNITY

## 2024-04-02 RX ORDER — LANOLIN ALCOHOL/MO/W.PET/CERES
100 CREAM (GRAM) TOPICAL EVERY MORNING
COMMUNITY

## 2024-04-02 NOTE — DISCHARGE INSTRUCTIONS
Follow prep on Wednesday. Clear liquids only. Nothing by mouth after midnight. Stop Eliquis 3/31/24. Take Metoprolol and Entresto AM of procedure with small sip of water.         INSTRUCTIONS  AFTER A COLONOSCOPY/EGD    NO DRIVING X 24 HOURS. NOTIFY YOUR DOCTOR WITH     ABDOMINAL PAIN UNRELIEVED BY  PASSING GAS,   FEVER WITHIN 24 HOURS, OR LARGE AMOUNT OF BLEEDING.

## 2024-04-03 RX ORDER — SODIUM CHLORIDE 0.9 % (FLUSH) 0.9 %
10 SYRINGE (ML) INJECTION
Status: CANCELLED | OUTPATIENT
Start: 2024-04-03

## 2024-04-03 NOTE — ANESTHESIA PREPROCEDURE EVALUATION
04/03/2024  Kaila Hammond is a 73 y.o., female.      Pre-op Assessment    I have reviewed the Patient Summary Reports.     I have reviewed the Nursing Notes. I have reviewed the NPO Status.   I have reviewed the Medications.     Review of Systems  Anesthesia Hx:             Denies Family Hx of Anesthesia complications.    Denies Personal Hx of Anesthesia complications.                    Hematology/Oncology:  Hematology Normal   Oncology Normal                                   EENT/Dental:  EENT/Dental Normal           Cardiovascular:     Hypertension, well controlled       CHF                                 Pulmonary:        Sleep Apnea                Renal/:  Chronic Renal Disease, CKD                Musculoskeletal:  Musculoskeletal Normal                Neurological:    Neuromuscular Disease,                                   Endocrine:  Diabetes           Psych:  Psychiatric History                  Physical Exam  General: Cooperative, Alert and Oriented    Airway:  Mallampati: II   Mouth Opening: Normal  TM Distance: Normal  Tongue: Normal  Neck ROM: Normal ROM    Dental:  Intact        Anesthesia Plan  Type of Anesthesia, risks & benefits discussed:    Anesthesia Type: Gen Natural Airway  Intra-op Monitoring Plan: Standard ASA Monitors  Post Op Pain Control Plan:   (medical reason for not using multimodal pain management)  Induction:  IV  Informed Consent: Informed consent signed with the Patient and all parties understand the risks and agree with anesthesia plan.  All questions answered. Patient consented to blood products? Yes  ASA Score: 3    Ready For Surgery From Anesthesia Perspective.     .

## 2024-04-04 ENCOUNTER — HOSPITAL ENCOUNTER (OUTPATIENT)
Facility: HOSPITAL | Age: 74
Discharge: HOME | End: 2024-04-04
Attending: INTERNAL MEDICINE | Admitting: INTERNAL MEDICINE
Payer: MEDICARE

## 2024-04-04 ENCOUNTER — ANESTHESIA (OUTPATIENT)
Dept: SURGERY | Facility: HOSPITAL | Age: 74
End: 2024-04-04
Payer: MEDICARE

## 2024-04-04 VITALS
WEIGHT: 210.13 LBS | RESPIRATION RATE: 18 BRPM | BODY MASS INDEX: 32.98 KG/M2 | HEIGHT: 67 IN | OXYGEN SATURATION: 95 % | DIASTOLIC BLOOD PRESSURE: 81 MMHG | TEMPERATURE: 98 F | HEART RATE: 71 BPM | SYSTOLIC BLOOD PRESSURE: 134 MMHG

## 2024-04-04 DIAGNOSIS — R13.10 DYSPHAGIA, UNSPECIFIED TYPE: ICD-10-CM

## 2024-04-04 DIAGNOSIS — Z80.0 FAMILY HISTORY OF COLON CANCER: ICD-10-CM

## 2024-04-04 DIAGNOSIS — Z86.010 PERSONAL HISTORY OF COLONIC POLYPS: ICD-10-CM

## 2024-04-04 DIAGNOSIS — Z86.010 HX OF COLONIC POLYPS: ICD-10-CM

## 2024-04-04 LAB — POCT GLUCOSE: 124 MG/DL (ref 70–110)

## 2024-04-04 PROCEDURE — 88312 SPECIAL STAINS GROUP 1: CPT

## 2024-04-04 PROCEDURE — C1773 RET DEV, INSERTABLE: HCPCS | Performed by: INTERNAL MEDICINE

## 2024-04-04 PROCEDURE — 63600175 PHARM REV CODE 636 W HCPCS: Performed by: ANESTHESIOLOGY

## 2024-04-04 PROCEDURE — 25000003 PHARM REV CODE 250: Performed by: NURSE ANESTHETIST, CERTIFIED REGISTERED

## 2024-04-04 PROCEDURE — D9220A PRA ANESTHESIA: Mod: ,,, | Performed by: NURSE ANESTHETIST, CERTIFIED REGISTERED

## 2024-04-04 PROCEDURE — 37000008 HC ANESTHESIA 1ST 15 MINUTES: Performed by: INTERNAL MEDICINE

## 2024-04-04 PROCEDURE — 27201423 OPTIME MED/SURG SUP & DEVICES STERILE SUPPLY: Performed by: INTERNAL MEDICINE

## 2024-04-04 PROCEDURE — 45380 COLONOSCOPY AND BIOPSY: CPT | Mod: 59 | Performed by: INTERNAL MEDICINE

## 2024-04-04 PROCEDURE — 37000009 HC ANESTHESIA EA ADD 15 MINS: Performed by: INTERNAL MEDICINE

## 2024-04-04 PROCEDURE — 63600175 PHARM REV CODE 636 W HCPCS: Performed by: NURSE ANESTHETIST, CERTIFIED REGISTERED

## 2024-04-04 PROCEDURE — 45385 COLONOSCOPY W/LESION REMOVAL: CPT | Performed by: INTERNAL MEDICINE

## 2024-04-04 PROCEDURE — 88313 SPECIAL STAINS GROUP 2: CPT

## 2024-04-04 PROCEDURE — 88305 TISSUE EXAM BY PATHOLOGIST: CPT | Performed by: INTERNAL MEDICINE

## 2024-04-04 PROCEDURE — 43239 EGD BIOPSY SINGLE/MULTIPLE: CPT | Performed by: INTERNAL MEDICINE

## 2024-04-04 PROCEDURE — 82962 GLUCOSE BLOOD TEST: CPT | Performed by: INTERNAL MEDICINE

## 2024-04-04 RX ORDER — SODIUM CHLORIDE, SODIUM LACTATE, POTASSIUM CHLORIDE, CALCIUM CHLORIDE 600; 310; 30; 20 MG/100ML; MG/100ML; MG/100ML; MG/100ML
INJECTION, SOLUTION INTRAVENOUS CONTINUOUS
Status: DISCONTINUED | OUTPATIENT
Start: 2024-04-04 | End: 2024-04-04 | Stop reason: HOSPADM

## 2024-04-04 RX ORDER — PROPOFOL 10 MG/ML
VIAL (ML) INTRAVENOUS
Status: DISCONTINUED | OUTPATIENT
Start: 2024-04-04 | End: 2024-04-04

## 2024-04-04 RX ORDER — LIDOCAINE HYDROCHLORIDE 20 MG/ML
INJECTION, SOLUTION EPIDURAL; INFILTRATION; INTRACAUDAL; PERINEURAL
Status: DISCONTINUED | OUTPATIENT
Start: 2024-04-04 | End: 2024-04-04

## 2024-04-04 RX ADMIN — LIDOCAINE HYDROCHLORIDE 120 MG: 20 INJECTION, SOLUTION EPIDURAL; INFILTRATION; INTRACAUDAL; PERINEURAL at 06:04

## 2024-04-04 RX ADMIN — PROPOFOL 50 MG: 10 INJECTION, EMULSION INTRAVENOUS at 07:04

## 2024-04-04 RX ADMIN — PROPOFOL 10 MG: 10 INJECTION, EMULSION INTRAVENOUS at 07:04

## 2024-04-04 RX ADMIN — PROPOFOL 30 MG: 10 INJECTION, EMULSION INTRAVENOUS at 06:04

## 2024-04-04 RX ADMIN — SODIUM CHLORIDE, POTASSIUM CHLORIDE, SODIUM LACTATE AND CALCIUM CHLORIDE: 600; 310; 30; 20 INJECTION, SOLUTION INTRAVENOUS at 05:04

## 2024-04-04 RX ADMIN — PROPOFOL 70 MG: 10 INJECTION, EMULSION INTRAVENOUS at 06:04

## 2024-04-04 NOTE — ANESTHESIA POSTPROCEDURE EVALUATION
Anesthesia Post Evaluation    Patient: Kaila Hammond    Procedure(s) Performed: Procedure(s) (LRB):  COLONOSCOPY (N/A)  EGD (ESOPHAGOGASTRODUODENOSCOPY) (N/A)  EGD, WITH CLOSED BIOPSY  COLONOSCOPY, WITH POLYPECTOMY USING HOT BIOPSY FORCEPS (N/A)  COLONOSCOPY, WITH POLYPECTOMY USING SNARE (N/A)  COLONOSCOPY, WITH 1 OR MORE BIOPSIES (N/A)    Final Anesthesia Type: general      Patient participation: Yes- Able to Participate  Level of consciousness: awake and alert and oriented  Post-procedure vital signs: reviewed and stable  Pain management: adequate  Airway patency: patent    PONV status at discharge: No PONV  Anesthetic complications: no      Cardiovascular status: stable  Respiratory status: unassisted, spontaneous ventilation and room air  Hydration status: euvolemic  Follow-up not needed.              Vitals Value Taken Time   /85 04/04/24 0547   Temp 36.4 °C (97.5 °F) 04/04/24 0547   Pulse 88 04/04/24 0547   Resp 18 04/04/24 0547   SpO2 95 % 04/04/24 0547         No case tracking events are documented in the log.      Pain/Bakari Score: No data recorded

## 2024-04-04 NOTE — OP NOTE
[facility] - Periop Services  Operative Note    Date of Procedure: 4/4/2024     Procedure: Procedure(s) (LRB):  COLONOSCOPY (N/A)  EGD (ESOPHAGOGASTRODUODENOSCOPY) (N/A)  EGD, WITH CLOSED BIOPSY  COLONOSCOPY, WITH POLYPECTOMY USING HOT BIOPSY FORCEPS (N/A)  COLONOSCOPY, WITH POLYPECTOMY USING SNARE (N/A)  COLONOSCOPY, WITH 1 OR MORE BIOPSIES (N/A)   Colonoscopy with cold biopsy polypectomy and hot snare      Surgeon(s) and Role:     * Chuck Lucas III, MD - Primary    Assisting Surgeon: None    Pre-Operative Diagnosis: Personal history of colonic polyps [Z86.010]  Family history of colon cancer [Z80.0]  Family history of colon cancer    Post-Operative Diagnosis: Post-Op Diagnosis Codes:     * Personal history of colonic polyps [Z86.010]     * Family history of colon cancer [Z80.0]     * Dysphagia, unspecified type [R13.10]  Proximal ascending cold polypectomy subcentimeter polyp   Mid ascending hot snare polypectomy subcentimeter polyp   Distal ascending cold subcentimeter polypectomy   Rectosigmoid subcentimeter cold polypectomy    Endoscopic Specimens:  ID Type Source Tests Collected by Time Destination   A : A. ANTRUM BX Tissue Antrum SPECIMEN TO PATHOLOGY Chuck Lucas III, MD 4/4/2024 0650    B : PROXIMAL ASCENDING COLON LESION BX Tissue Intestine Large, Ascending Colon SPECIMEN TO PATHOLOGY Chuck Lucas III, MD 4/4/2024 0733    C : ASCENDING COLON X 2 Tissue Polyp SPECIMEN TO PATHOLOGY Chuck Lucas III, MD 4/4/2024 0734    D : DISTAL ASCENDING COLON POLYP Tissue Polyp SPECIMEN TO PATHOLOGY Chuck Lucas III, MD 4/4/2024 0744    E : RECTUS-SIGMOID POLYP Tissue Polyp SPECIMEN TO PATHOLOGY Chuck Lucas III, MD 4/4/2024 0748          Anesthesia: General    Consent:  Patient was consented for the procedure at my office.  The risks and benefits of procedure explained in detail.  They were willing to undergo those risks.    HPI & Operative Findings (including  complications, if any):  This is a 73-year-old white female with a reported family history of colon cancer.  No alarm features.    She was brought down to the endoscopy suite.  180 degree turn after EGD she was placed in left lateral decubitus position rectal exam was within normal limits.  Suboptimal rectal tone.      The Olympus colonoscope was then lubricated and advanced to about 30 cm where we had some mild difficulty then I was able to ultimately get to the cecum.      The scope was then used to try to intubate the terminal ileum.  Because of the issues had on the left side of the colon was unable to intubate the terminal ileum completely.  But I could clearly see the previous appendiceal orifice.  Of note patient had a appendectomy at age 8.  Otherwise no other abnormalities noted in the cecum.      360 degree circumferential views.  There were several polyps in the ascending colon.  These were all very benign-appearing.  There was a 2-3 mm polyp that was cold biopsy removed in the proximal ascending in jar letter B. in jar letter C there were 2 polyps that were subcentimeter that were hot snared and placed in jar letter C.  No perforation no blood loss.  And finally in jar letter D and the distal ascending there was a subcentimeter polyp that was done in a cold biopsy fashion.  No perforation no bleed.      The hepatic flexure transverse colon splenic flexure descending colon were all within normal limits.  In the distal sigmoid there was a 3 mm lesion that was cold biopsy piecemeal removed.  Placed in jar letter E.  No perforation no bleed no blood loss.  Rectum on retroflexion normal.  Scope was removed patient tolerated procedure well without complications.      Discussion:    Recommend repeat follow up on these biopsies and repeat colonoscopy in 3 years.       Blood Loss (EBL):  0           Implants: * No implants in log *    Specimens:   Specimen (24h ago, onward)       Start     Ordered    04/04/24  0706  Specimen to Pathology  RELEASE UPON ORDERING        References:    Click here for ordering Quick Tip   Question:  Release to patient  Answer:  Immediate    04/04/24 0706                            Condition: Stable for Discharge    Disposition: Home or Self Care        Discharge Note    OUTCOME: Patient tolerated treatment/procedure well without complication and is now ready for discharge.    DISPOSITION: Home or Self Care    FINAL DIAGNOSIS:  <principal problem not specified>    FOLLOWUP: Follow up in clinic in 1-2 weeks to review biopsies    DISCHARGE INSTRUCTIONS:  No discharge procedures on file.

## 2024-04-04 NOTE — OP NOTE
[facility] - Periop Services  Operative Note    Date of Procedure: 4/4/2024     Procedure: Procedure(s) (LRB):  COLONOSCOPY (N/A)  EGD (ESOPHAGOGASTRODUODENOSCOPY) (N/A)  EGD, WITH CLOSED BIOPSY  COLONOSCOPY, WITH POLYPECTOMY USING HOT BIOPSY FORCEPS (N/A)  COLONOSCOPY, WITH POLYPECTOMY USING SNARE (N/A)  COLONOSCOPY, WITH 1 OR MORE BIOPSIES (N/A)   EGD with biopsy    Surgeon(s) and Role:     * Chuck Lucas III, MD - Primary    Assisting Surgeon: None    Pre-Operative Diagnosis: Personal history of colonic polyps [Z86.010]  Family history of colon cancer [Z80.0]   Dysphagia    Post-Operative Diagnosis: Post-Op Diagnosis Codes:     * Personal history of colonic polyps [Z86.010]     * Family history of colon cancer [Z80.0]     * Dysphagia, unspecified type [R13.10]  Moderate to severe chronic gastritis with petechial ooze   Status post H pylori biopsy gastric antrum      Endoscopic Specimens:  ID Type Source Tests Collected by Time Destination   A : A. ANTRUM BX Tissue Antrum SPECIMEN TO PATHOLOGY Chuck Lucas III, MD 4/4/2024 0650    B : PROXIMAL ASCENDING COLON LESION BX Tissue Intestine Large, Ascending Colon SPECIMEN TO PATHOLOGY Chuck Lucas III, MD 4/4/2024 0733    C : ASCENDING COLON X 2 Tissue Polyp SPECIMEN TO PATHOLOGY Chuck Lucas III, MD 4/4/2024 0734    D : DISTAL ASCENDING COLON POLYP Tissue Polyp SPECIMEN TO PATHOLOGY Chuck Lucas III, MD 4/4/2024 0744    E : RECTUS-SIGMOID POLYP Tissue Polyp SPECIMEN TO PATHOLOGY Chuck Lucas III, MD 4/4/2024 0748          Anesthesia: General    Consent:  Patient was consented for the procedure at my office.  The risks and benefits of procedure explained in detail.  They were willing to undergo those risks.    HPI & Operative Findings (including complications, if any):  73-year-old white female with a history of open heart surgery and atrial fibrillation on factor Xa inhibitor.  She also is a diabetic and is on a GLP 1  for some time.  She takes no other NSAIDs.  Patient was having subjectively some difficulty swallowing solids more than liquids.  We are performing EGD to rule out adverse pathology.  No other risk factors except as above.    Patient was brought down to the endoscopy room suite.  Been fontanel CRNA present.  Please see his medications for administration.  Kat Bradford also 1st assistant.      She was laid in a semi left lateral decubitus position bite block was placed.  The Olympus gastroscope was then lubricated inserted the posterior oropharynx.  Vocal cords and larynx and posterior oropharyngeal region normal.  The scope was then passed down through the piriform recess into the esophageal column in into the stomach without any signs of stricture or mass.  Of note, distally she had a little tightness which makes me think she may have some achalasia.      The scope was then used to insufflate the stomach.  Distally the stomach clearly had some moderate chronic to severe chronic gastritis with petechial ooze.  Blood loss was a proximally 2.5 cc.  This was nascent blood.  No active hemorrhage however.  The scope was then passed through the pylorus and the duodenum was swept in his thirds 1st and 2nd portions there were no abnormalities noted.  Next para the scope was pulled back biopsy was taken of the gastric antrum for H pylori in jar A.    Clearly the patient had petechial oozing in the gastric antrum greater lesser curvatures but there were no masses polyps or any other mucosal changes.     The scope was taken into retroflexion the angularis cardiac and fundic regions were more normal with less abnormalities.  There were no signs of hiatal hernia.  Next para the scope was then then pulled back into the distal esophagus.  Perhaps achalasia present.  No other masses no strictures no polyps no signs of bleeding.  The esophageal column showed good peristalsis proximally.  The stomach in general had good  peristalsis after she had been off for GLP 1 for 2 weeks.      Scope was removed patient tolerated procedure well without complications.  Blood loss was 2.5 cc.      Discussion:     Patient not on any NSAIDs will confirm that is over-the-counter after she wakes up procedure.  I am concerned about GLP 1 and her Eliquis could be causing her some issues.  This may not be the appropriate drug for her if she is developing gastritis.  For right now will place her on a PPI and see if we can mitigate this.    We will follow up in my office on the biopsies.    Will likely recommend upper GI series to rule out achalasia.          Blood Loss (EBL): * 2.5 cc           Implants: * No implants in log *    Specimens:   Specimen (24h ago, onward)       Start     Ordered    04/04/24 0706  Specimen to Pathology  RELEASE UPON ORDERING        References:    Click here for ordering Quick Tip   Question:  Release to patient  Answer:  Immediate    04/04/24 0706                            Condition: Stable for Discharge    Disposition: Home or Self Care        Discharge Note    OUTCOME: Patient tolerated treatment/procedure well without complication and is now ready for discharge.    DISPOSITION: Home or Self Care    FINAL DIAGNOSIS:  <principal problem not specified>    FOLLOWUP: Follow up in clinic in 1-2 weeks to review biopsies    DISCHARGE INSTRUCTIONS:  No discharge procedures on file.

## 2024-04-08 LAB — PSYCHE PATHOLOGY RESULT: NORMAL

## 2024-04-25 DIAGNOSIS — R13.19 OTHER DYSPHAGIA: Primary | ICD-10-CM

## 2024-05-03 ENCOUNTER — HOSPITAL ENCOUNTER (OUTPATIENT)
Dept: RADIOLOGY | Facility: HOSPITAL | Age: 74
Discharge: HOME | End: 2024-05-03
Attending: INTERNAL MEDICINE
Payer: MEDICARE

## 2024-05-03 DIAGNOSIS — R13.19 OTHER DYSPHAGIA: ICD-10-CM

## 2024-05-03 PROCEDURE — 71046 X-RAY EXAM CHEST 2 VIEWS: CPT | Mod: TC

## 2024-05-06 ENCOUNTER — HOSPITAL ENCOUNTER (OUTPATIENT)
Dept: RADIOLOGY | Facility: HOSPITAL | Age: 74
Discharge: HOME | End: 2024-05-06
Attending: INTERNAL MEDICINE
Payer: MEDICARE

## 2024-05-06 PROCEDURE — 25500020 PHARM REV CODE 255: Performed by: INTERNAL MEDICINE

## 2024-05-06 PROCEDURE — 74240 X-RAY XM UPR GI TRC 1CNTRST: CPT | Mod: TC

## 2024-05-06 PROCEDURE — A9698 NON-RAD CONTRAST MATERIALNOC: HCPCS | Performed by: INTERNAL MEDICINE

## 2024-05-06 RX ADMIN — BARIUM SULFATE 135 ML: 980 POWDER, FOR SUSPENSION ORAL at 08:05

## 2024-06-19 ENCOUNTER — HOSPITAL ENCOUNTER (EMERGENCY)
Facility: HOSPITAL | Age: 74
Discharge: HOME | End: 2024-06-19
Attending: FAMILY MEDICINE
Payer: MEDICARE

## 2024-06-19 VITALS
DIASTOLIC BLOOD PRESSURE: 99 MMHG | HEIGHT: 66 IN | WEIGHT: 224 LBS | BODY MASS INDEX: 36 KG/M2 | TEMPERATURE: 98 F | RESPIRATION RATE: 17 BRPM | SYSTOLIC BLOOD PRESSURE: 142 MMHG | OXYGEN SATURATION: 100 % | HEART RATE: 64 BPM

## 2024-06-19 DIAGNOSIS — R53.1 WEAKNESS: ICD-10-CM

## 2024-06-19 DIAGNOSIS — E86.0 DEHYDRATION: Primary | ICD-10-CM

## 2024-06-19 DIAGNOSIS — N17.9 AKI (ACUTE KIDNEY INJURY): ICD-10-CM

## 2024-06-19 LAB
ALBUMIN SERPL-MCNC: 4.3 G/DL (ref 3.4–4.8)
ALBUMIN/GLOB SERPL: 1.1 RATIO (ref 1.1–2)
ALP SERPL-CCNC: 100 UNIT/L (ref 40–150)
ALT SERPL-CCNC: 26 UNIT/L (ref 0–55)
ANION GAP SERPL CALC-SCNC: 12 MEQ/L
AST SERPL-CCNC: 30 UNIT/L (ref 5–34)
BASOPHILS # BLD AUTO: 0.06 X10(3)/MCL
BASOPHILS NFR BLD AUTO: 0.6 %
BILIRUB SERPL-MCNC: 0.7 MG/DL
BILIRUB UR QL STRIP.AUTO: NEGATIVE
BUN SERPL-MCNC: 28 MG/DL (ref 9.8–20.1)
CALCIUM SERPL-MCNC: 9.7 MG/DL (ref 8.4–10.2)
CHLORIDE SERPL-SCNC: 97 MMOL/L (ref 98–107)
CK SERPL-CCNC: 130 U/L (ref 29–168)
CLARITY UR: CLEAR
CO2 SERPL-SCNC: 30 MMOL/L (ref 23–31)
COLOR UR AUTO: YELLOW
CREAT SERPL-MCNC: 1.88 MG/DL (ref 0.55–1.02)
CREAT/UREA NIT SERPL: 15
EOSINOPHIL # BLD AUTO: 0.12 X10(3)/MCL (ref 0–0.9)
EOSINOPHIL NFR BLD AUTO: 1.1 %
ERYTHROCYTE [DISTWIDTH] IN BLOOD BY AUTOMATED COUNT: 15.8 % (ref 11.5–17)
FLUAV AG UPPER RESP QL IA.RAPID: NOT DETECTED
FLUBV AG UPPER RESP QL IA.RAPID: NOT DETECTED
GFR SERPLBLD CREATININE-BSD FMLA CKD-EPI: 28 ML/MIN/1.73/M2
GLOBULIN SER-MCNC: 3.8 GM/DL (ref 2.4–3.5)
GLUCOSE SERPL-MCNC: 114 MG/DL (ref 82–115)
GLUCOSE UR QL STRIP: NEGATIVE
HCT VFR BLD AUTO: 38 % (ref 37–47)
HGB BLD-MCNC: 12.3 G/DL (ref 12–16)
HGB UR QL STRIP: NEGATIVE
IMM GRANULOCYTES # BLD AUTO: 0.05 X10(3)/MCL (ref 0–0.04)
IMM GRANULOCYTES NFR BLD AUTO: 0.5 %
KETONES UR QL STRIP: NEGATIVE
LEUKOCYTE ESTERASE UR QL STRIP: NEGATIVE
LYMPHOCYTES # BLD AUTO: 1.25 X10(3)/MCL (ref 0.6–4.6)
LYMPHOCYTES NFR BLD AUTO: 11.9 %
MCH RBC QN AUTO: 28.8 PG (ref 27–31)
MCHC RBC AUTO-ENTMCNC: 32.4 G/DL (ref 33–36)
MCV RBC AUTO: 89 FL (ref 80–94)
MONOCYTES # BLD AUTO: 0.87 X10(3)/MCL (ref 0.1–1.3)
MONOCYTES NFR BLD AUTO: 8.3 %
NEUTROPHILS # BLD AUTO: 8.19 X10(3)/MCL (ref 2.1–9.2)
NEUTROPHILS NFR BLD AUTO: 77.6 %
NITRITE UR QL STRIP: NEGATIVE
OHS QRS DURATION: 98 MS
OHS QTC CALCULATION: 473 MS
PH UR STRIP: 6 [PH]
PLATELET # BLD AUTO: 195 X10(3)/MCL (ref 130–400)
PMV BLD AUTO: 10.6 FL (ref 7.4–10.4)
POTASSIUM SERPL-SCNC: 4.3 MMOL/L (ref 3.5–5.1)
PROT SERPL-MCNC: 8.1 GM/DL (ref 5.8–7.6)
PROT UR QL STRIP: NEGATIVE
RBC # BLD AUTO: 4.27 X10(6)/MCL (ref 4.2–5.4)
RSV A 5' UTR RNA NPH QL NAA+PROBE: NOT DETECTED
SARS-COV-2 RNA RESP QL NAA+PROBE: NOT DETECTED
SODIUM SERPL-SCNC: 139 MMOL/L (ref 136–145)
SP GR UR STRIP.AUTO: <=1.005 (ref 1–1.03)
UROBILINOGEN UR STRIP-ACNC: 0.2
WBC # BLD AUTO: 10.54 X10(3)/MCL (ref 4.5–11.5)

## 2024-06-19 PROCEDURE — 80053 COMPREHEN METABOLIC PANEL: CPT | Performed by: FAMILY MEDICINE

## 2024-06-19 PROCEDURE — 99285 EMERGENCY DEPT VISIT HI MDM: CPT | Mod: 25

## 2024-06-19 PROCEDURE — 25000003 PHARM REV CODE 250: Performed by: FAMILY MEDICINE

## 2024-06-19 PROCEDURE — 93005 ELECTROCARDIOGRAM TRACING: CPT

## 2024-06-19 PROCEDURE — 93010 ELECTROCARDIOGRAM REPORT: CPT | Mod: ,,, | Performed by: INTERNAL MEDICINE

## 2024-06-19 PROCEDURE — 96360 HYDRATION IV INFUSION INIT: CPT

## 2024-06-19 PROCEDURE — 85025 COMPLETE CBC W/AUTO DIFF WBC: CPT | Performed by: FAMILY MEDICINE

## 2024-06-19 PROCEDURE — 96361 HYDRATE IV INFUSION ADD-ON: CPT

## 2024-06-19 PROCEDURE — 81003 URINALYSIS AUTO W/O SCOPE: CPT | Performed by: FAMILY MEDICINE

## 2024-06-19 PROCEDURE — 0241U COVID/RSV/FLU A&B PCR: CPT | Performed by: FAMILY MEDICINE

## 2024-06-19 PROCEDURE — 82550 ASSAY OF CK (CPK): CPT | Performed by: FAMILY MEDICINE

## 2024-06-19 RX ADMIN — SODIUM CHLORIDE 1000 ML: 9 INJECTION, SOLUTION INTRAVENOUS at 02:06

## 2024-06-19 RX ADMIN — SODIUM CHLORIDE 1000 ML: 9 INJECTION, SOLUTION INTRAVENOUS at 12:06

## 2024-06-19 NOTE — ED PROVIDER NOTES
Encounter Date: 6/19/2024       History     Chief Complaint   Patient presents with    Dizziness    Weakness     Weakness and dizziness at Dr Jane office today     Pt has been spending a lot of time out in the sun, was at PCP clinic, was weak, not hypotensive but lower than normal blood pressure, sent for eval, IVF        Review of patient's allergies indicates:   Allergen Reactions    Augmentin [amoxicillin-pot clavulanate]     Cefaclor     Cephalexin     Penicillins     Sulfa (sulfonamide antibiotics)     Sulfamethoxazole-trimethoprim Hives     Past Medical History:   Diagnosis Date    A-fib     Chronic kidney disease     CNS lymphoma     Depression     DM (diabetes mellitus)     Essential (primary) hypertension     Mixed hyperlipidemia     Obesity     Peripheral vascular disease     Personal history of colonic polyps     Polyneuropathy     Sleep apnea     CPAP     Past Surgical History:   Procedure Laterality Date    ANTERIOR LUMBAR INTERBODY FUSION (ALIF)      APPENDECTOMY      BRAIN TUMOR EXCISION      CARPAL TUNNEL RELEASE Bilateral     CATARACT EXTRACTION Bilateral     CHOLECYSTECTOMY      COLONOSCOPY N/A 4/4/2024    Procedure: COLONOSCOPY;  Surgeon: Chuck Lucas III, MD;  Location: [facility];  Service: Endoscopy;  Laterality: N/A;    COLONOSCOPY, WITH 1 OR MORE BIOPSIES N/A 4/4/2024    Procedure: COLONOSCOPY, WITH 1 OR MORE BIOPSIES;  Surgeon: Chuck Lucas III, MD;  Location: [facility];  Service: Endoscopy;  Laterality: N/A;  B. PROXIMAL ASCENDING LESION BX W/O HEAT    COLONOSCOPY, WITH POLYPECTOMY USING HOT BIOPSY FORCEPS N/A 4/4/2024    Procedure: COLONOSCOPY, WITH POLYPECTOMY USING HOT BIOPSY FORCEPS;  Surgeon: Chuck Lucas III, MD;  Location: [facility];  Service: Endoscopy;  Laterality: N/A;  D. DISTAL ASCENDING POLYP W/O HEAT USING BX FORCEPS  E. POLYP RECTUS-SIGMOID W/O HEAT USING BX FORCEPS    COLONOSCOPY, WITH POLYPECTOMY USING SNARE N/A 4/4/2024    Procedure: COLONOSCOPY, WITH  POLYPECTOMY USING SNARE;  Surgeon: Chuck Lucas III, MD;  Location: [facility];  Service: Endoscopy;  Laterality: N/A;  C. POLYP X 2 ASCENDING COLON W/ HEAT    CORONARY ARTERY BYPASS GRAFT      EGD, WITH CLOSED BIOPSY  4/4/2024    Procedure: EGD, WITH CLOSED BIOPSY;  Surgeon: Chuck Lucas III, MD;  Location: [facility];  Service: Endoscopy;;  A. ANTRUM BX   B.    ESOPHAGOGASTRODUODENOSCOPY N/A 4/4/2024    Procedure: EGD (ESOPHAGOGASTRODUODENOSCOPY);  Surgeon: Chuck Lucas III, MD;  Location: [facility];  Service: Endoscopy;  Laterality: N/A;  POST OP: MODERATE TO SEVERE GASTRITIS W/ BLOODY OOZE    KNEE ARTHROSCOPY Left     LAMINECTOMY      MINIMALLY INVASIVE TRANSFORAMINAL LUMBAR INTERBODY FUSION (TLIF)      TOTAL ABDOMINAL HYSTERECTOMY W/ BILATERAL SALPINGOOPHORECTOMY      TYMPANOPLASTY WITH MASTOIDECTOMY      and removal of cholesteotoma     Family History   Problem Relation Name Age of Onset    Hypertension Mother      Ovarian cancer Mother      Diabetes Mellitus Mother      Rectal cancer Father       Social History     Tobacco Use    Smoking status: Former     Types: Cigarettes    Smokeless tobacco: Never   Substance Use Topics    Alcohol use: Never    Drug use: Never     Review of Systems   All other systems reviewed and are negative.      Physical Exam     Initial Vitals   BP Pulse Resp Temp SpO2   06/19/24 1219 06/19/24 1222 06/19/24 1219 06/19/24 1219 06/19/24 1222   132/85 65 16 98 °F (36.7 °C) (!) 94 %      MAP       --                Physical Exam    Nursing note and vitals reviewed.  Constitutional: She appears well-developed and well-nourished.   HENT:   Head: Normocephalic and atraumatic.   Neck: Neck supple.   Cardiovascular:  Normal rate and regular rhythm.           Pulmonary/Chest: Breath sounds normal.   Abdominal: Abdomen is soft.   Musculoskeletal:      Cervical back: Neck supple.     Neurological: She is alert and oriented to person, place, and time.   Skin: Skin is warm.    Psychiatric: She has a normal mood and affect. Thought content normal.         ED Course   Procedures  Labs Reviewed   COMPREHENSIVE METABOLIC PANEL - Abnormal; Notable for the following components:       Result Value    Chloride 97 (*)     Blood Urea Nitrogen 28.0 (*)     Creatinine 1.88 (*)     Protein Total 8.1 (*)     Globulin 3.8 (*)     All other components within normal limits   CBC WITH DIFFERENTIAL - Abnormal; Notable for the following components:    MCHC 32.4 (*)     MPV 10.6 (*)     IG# 0.05 (*)     All other components within normal limits   COVID/RSV/FLU A&B PCR - Normal    Narrative:     The Xpert Xpress SARS-CoV-2/FLU/RSV plus is a rapid, multiplexed real-time PCR test intended for the simultaneous qualitative detection and differentiation of SARS-CoV-2, Influenza A, Influenza B, and respiratory syncytial virus (RSV) viral RNA in either nasopharyngeal swab or nasal swab specimens.         URINALYSIS, REFLEX TO URINE CULTURE - Normal   CK - Normal   CBC W/ AUTO DIFFERENTIAL    Narrative:     The following orders were created for panel order CBC auto differential.  Procedure                               Abnormality         Status                     ---------                               -----------         ------                     CBC with Differential[[id number]]       Abnormal            Final result                 Please view results for these tests on the individual orders.     EKG Readings: (Independently Interpreted)   EKG shows normal sinus rhythm rate of 67.  No ischemia.  Interpreted myself.     ECG Results              EKG 12-lead (In process)        Collection Time Result Time QRS Duration [facility] QTC Calculation    06/19/24 12:18:59 06/19/24 13:12:02 98 473                     In process by Interface, Lab In [facility] (06/19/24 13:12:11)                   Narrative:    Test Reason : R53.1,    Vent. Rate : 067 BPM     Atrial Rate : 067 BPM     P-R Int : 198 ms          QRS Dur : 098 ms       QT Int : 448 ms       P-R-T Axes : 064 087 077 degrees     QTc Int : 473 ms    Normal sinus rhythm  Low voltage QRS  Borderline Abnormal ECG  When compared with ECG of 02-APR-2024 08:22,  Questionable change in The axis    Referred By: [email or web address]   SELF           Confirmed By:                                   Imaging Results              X-Ray Chest AP Portable (Preliminary result)  Result time 06/19/24 13:35:38      Wet Read by Chuck Cameron Jr., MD (06/19/24 13:35:38, [facility] - Emergency Dept, Emergency Medicine)    Poor inspiration, no acute finding                                     Medications   sodium chloride 0.9% bolus 1,000 mL 1,000 mL (1,000 mLs Intravenous New Bag 6/19/24 1258)   sodium chloride 0.9% bolus 1,000 mL 1,000 mL (has no administration in time range)     Medical Decision Making  Patient with mild elevation of creatinine.  We will give 2 L IV fluid here.  Blood pressure stable.  No other complaints.    Amount and/or Complexity of Data Reviewed  Labs: ordered. Decision-making details documented in ED Course.  Radiology: ordered and independent interpretation performed.               ED Course as of 06/19/24 1342   Wed Jun 19, 2024   1308 Creatinine(!): 1.88 [RB]      ED Course User Index  [RB] Chuck Cameron Jr., MD                           Clinical Impression:  Final diagnoses:  [R53.1] Weakness  [E86.0] Dehydration (Primary)  [N17.9] DORCAS (acute kidney injury)          ED Disposition Condition    Discharge Stable          ED Prescriptions    None       Follow-up Information       Follow up With Specialties Details Why Contact Info    Chuck Lucas III, MD Internal Medicine   [address]  [address]  [place] [address]  [phone]               Chuck Cameron Jr., MD  06/19/24 1347

## 2024-07-10 DIAGNOSIS — R06.09 DYSPNEA ON EXERTION: Primary | ICD-10-CM

## 2024-11-26 ENCOUNTER — LAB VISIT (OUTPATIENT)
Dept: LAB | Facility: HOSPITAL | Age: 74
End: 2024-11-26
Attending: INTERNAL MEDICINE
Payer: MEDICARE

## 2024-11-26 DIAGNOSIS — E78.2 MIXED HYPERLIPIDEMIA: ICD-10-CM

## 2024-11-26 DIAGNOSIS — R78.81 BACTEREMIA: ICD-10-CM

## 2024-11-26 DIAGNOSIS — E11.42 DIABETIC POLYNEUROPATHY: ICD-10-CM

## 2024-11-26 DIAGNOSIS — I11.0 HYPERTENSIVE HEART DISEASE WITH CONGESTIVE HEART FAILURE: ICD-10-CM

## 2024-11-26 DIAGNOSIS — E55.9 AVITAMINOSIS D: Primary | ICD-10-CM

## 2024-11-26 LAB
25(OH)D3+25(OH)D2 SERPL-MCNC: 41 NG/ML (ref 30–80)
ALBUMIN SERPL-MCNC: 3.9 G/DL (ref 3.4–4.8)
ALBUMIN/GLOB SERPL: 1.1 RATIO (ref 1.1–2)
ALP SERPL-CCNC: 96 UNIT/L (ref 40–150)
ALT SERPL-CCNC: 23 UNIT/L (ref 0–55)
ANION GAP SERPL CALC-SCNC: 9 MEQ/L
AST SERPL-CCNC: 27 UNIT/L (ref 5–34)
BASOPHILS # BLD AUTO: 0.08 X10(3)/MCL
BASOPHILS NFR BLD AUTO: 1.1 %
BILIRUB SERPL-MCNC: 0.5 MG/DL
BUN SERPL-MCNC: 54 MG/DL (ref 9.8–20.1)
CALCIUM SERPL-MCNC: 9.4 MG/DL (ref 8.4–10.2)
CHLORIDE SERPL-SCNC: 96 MMOL/L (ref 98–107)
CHOLEST SERPL-MCNC: 187 MG/DL
CHOLEST/HDLC SERPL: 3 {RATIO} (ref 0–5)
CO2 SERPL-SCNC: 33 MMOL/L (ref 23–31)
CREAT SERPL-MCNC: 1.64 MG/DL (ref 0.55–1.02)
CREAT UR-MCNC: 47.6 MG/DL (ref 45–106)
CREAT/UREA NIT SERPL: 33
EOSINOPHIL # BLD AUTO: 0.31 X10(3)/MCL (ref 0–0.9)
EOSINOPHIL NFR BLD AUTO: 4.1 %
ERYTHROCYTE [DISTWIDTH] IN BLOOD BY AUTOMATED COUNT: 14.5 % (ref 11.5–17)
EST. AVERAGE GLUCOSE BLD GHB EST-MCNC: 191.5 MG/DL
GFR SERPLBLD CREATININE-BSD FMLA CKD-EPI: 33 ML/MIN/1.73/M2
GLOBULIN SER-MCNC: 3.6 GM/DL (ref 2.4–3.5)
GLUCOSE SERPL-MCNC: 151 MG/DL (ref 82–115)
HBA1C MFR BLD: 8.3 %
HCT VFR BLD AUTO: 41.4 % (ref 37–47)
HDLC SERPL-MCNC: 54 MG/DL (ref 35–60)
HGB BLD-MCNC: 13 G/DL (ref 12–16)
IMM GRANULOCYTES # BLD AUTO: 0.06 X10(3)/MCL (ref 0–0.04)
IMM GRANULOCYTES NFR BLD AUTO: 0.8 %
LDLC SERPL CALC-MCNC: 96 MG/DL (ref 50–140)
LYMPHOCYTES # BLD AUTO: 1.19 X10(3)/MCL (ref 0.6–4.6)
LYMPHOCYTES NFR BLD AUTO: 15.7 %
MCH RBC QN AUTO: 28.5 PG (ref 27–31)
MCHC RBC AUTO-ENTMCNC: 31.4 G/DL (ref 33–36)
MCV RBC AUTO: 90.8 FL (ref 80–94)
MICROALBUMIN UR-MCNC: 35.6 UG/ML
MONOCYTES # BLD AUTO: 0.72 X10(3)/MCL (ref 0.1–1.3)
MONOCYTES NFR BLD AUTO: 9.5 %
NEUTROPHILS # BLD AUTO: 5.24 X10(3)/MCL (ref 2.1–9.2)
NEUTROPHILS NFR BLD AUTO: 68.8 %
PLATELET # BLD AUTO: 210 X10(3)/MCL (ref 130–400)
PMV BLD AUTO: 10.9 FL (ref 7.4–10.4)
POTASSIUM SERPL-SCNC: 4 MMOL/L (ref 3.5–5.1)
PROT SERPL-MCNC: 7.5 GM/DL (ref 5.8–7.6)
RBC # BLD AUTO: 4.56 X10(6)/MCL (ref 4.2–5.4)
SODIUM SERPL-SCNC: 138 MMOL/L (ref 136–145)
TRIGL SERPL-MCNC: 187 MG/DL (ref 37–140)
VLDLC SERPL CALC-MCNC: 37 MG/DL
WBC # BLD AUTO: 7.6 X10(3)/MCL (ref 4.5–11.5)

## 2024-11-26 PROCEDURE — 85025 COMPLETE CBC W/AUTO DIFF WBC: CPT

## 2024-11-26 PROCEDURE — 83036 HEMOGLOBIN GLYCOSYLATED A1C: CPT

## 2024-11-26 PROCEDURE — 80053 COMPREHEN METABOLIC PANEL: CPT

## 2024-11-26 PROCEDURE — 82570 ASSAY OF URINE CREATININE: CPT

## 2024-11-26 PROCEDURE — 80061 LIPID PANEL: CPT

## 2024-11-26 PROCEDURE — 82306 VITAMIN D 25 HYDROXY: CPT

## 2024-11-26 PROCEDURE — 82043 UR ALBUMIN QUANTITATIVE: CPT

## 2024-11-26 PROCEDURE — 36415 COLL VENOUS BLD VENIPUNCTURE: CPT

## 2025-04-11 ENCOUNTER — HOSPITAL ENCOUNTER (OUTPATIENT)
Dept: RADIOLOGY | Facility: HOSPITAL | Age: 75
Discharge: HOME | End: 2025-04-11
Attending: INTERNAL MEDICINE
Payer: MEDICARE

## 2025-04-11 DIAGNOSIS — Z12.31 ENCOUNTER FOR SCREENING MAMMOGRAM FOR BREAST CANCER: ICD-10-CM

## 2025-04-11 PROCEDURE — 77063 BREAST TOMOSYNTHESIS BI: CPT | Mod: 26,,, | Performed by: STUDENT IN AN ORGANIZED HEALTH CARE EDUCATION/TRAINING PROGRAM

## 2025-04-11 PROCEDURE — 77067 SCR MAMMO BI INCL CAD: CPT | Mod: TC

## 2025-04-11 PROCEDURE — 77067 SCR MAMMO BI INCL CAD: CPT | Mod: 26,,, | Performed by: STUDENT IN AN ORGANIZED HEALTH CARE EDUCATION/TRAINING PROGRAM

## (undated) DEVICE — PAD ELECTROSURGICAL SPL W/CORD

## (undated) DEVICE — KIT SURGICAL COLON .25 1.1OZ

## (undated) DEVICE — TRAP SUCTION POLYP

## (undated) DEVICE — FORCEP ENDO BIOPSY CAPTURA

## (undated) DEVICE — BITE BLOCK ADULT LATEX FREE

## (undated) DEVICE — SNARE POLYP STD SNG 7FR